# Patient Record
Sex: FEMALE | Race: WHITE | Employment: FULL TIME | URBAN - METROPOLITAN AREA
[De-identification: names, ages, dates, MRNs, and addresses within clinical notes are randomized per-mention and may not be internally consistent; named-entity substitution may affect disease eponyms.]

---

## 2017-01-02 RX ORDER — SERTRALINE HYDROCHLORIDE 100 MG/1
TABLET, FILM COATED ORAL
Qty: 30 TAB | Refills: 0 | Status: SHIPPED | OUTPATIENT
Start: 2017-01-02 | End: 2017-02-05 | Stop reason: SDUPTHER

## 2017-02-06 RX ORDER — SERTRALINE HYDROCHLORIDE 100 MG/1
TABLET, FILM COATED ORAL
Qty: 30 TAB | Refills: 0 | Status: SHIPPED | OUTPATIENT
Start: 2017-02-06 | End: 2017-03-19 | Stop reason: SDUPTHER

## 2017-03-20 RX ORDER — LORAZEPAM 0.5 MG/1
TABLET ORAL
Qty: 60 TAB | Refills: 0 | OUTPATIENT
Start: 2017-03-20

## 2017-03-20 RX ORDER — SERTRALINE HYDROCHLORIDE 100 MG/1
TABLET, FILM COATED ORAL
Qty: 30 TAB | Refills: 5 | Status: SHIPPED | OUTPATIENT
Start: 2017-03-20 | End: 2017-08-11 | Stop reason: DRUGHIGH

## 2017-03-20 NOTE — TELEPHONE ENCOUNTER
Pt notified (confirmed x 3). Patient verbalized understanding. Sched appt on 3/24/17 @ 3:15 w/ Marleni.

## 2017-03-24 RX ORDER — LORAZEPAM 0.5 MG/1
TABLET ORAL
Qty: 14 TAB | Refills: 0 | Status: SHIPPED | OUTPATIENT
Start: 2017-03-24 | End: 2017-08-11

## 2017-06-27 ENCOUNTER — APPOINTMENT (OUTPATIENT)
Dept: CT IMAGING | Age: 27
End: 2017-06-27
Attending: PHYSICIAN ASSISTANT
Payer: COMMERCIAL

## 2017-06-27 ENCOUNTER — HOSPITAL ENCOUNTER (EMERGENCY)
Age: 27
Discharge: HOME | End: 2017-06-27
Attending: EMERGENCY MEDICINE
Payer: COMMERCIAL

## 2017-06-27 VITALS
WEIGHT: 230 LBS | OXYGEN SATURATION: 100 % | DIASTOLIC BLOOD PRESSURE: 78 MMHG | HEIGHT: 67 IN | BODY MASS INDEX: 36.1 KG/M2 | TEMPERATURE: 98.2 F | HEART RATE: 87 BPM | SYSTOLIC BLOOD PRESSURE: 133 MMHG | RESPIRATION RATE: 15 BRPM

## 2017-06-27 DIAGNOSIS — N20.1 URETERAL STONE: Primary | ICD-10-CM

## 2017-06-27 LAB
ANION GAP BLD CALC-SCNC: 16 MMOL/L (ref 5–15)
APPEARANCE UR: ABNORMAL
BACTERIA URNS QL MICRO: ABNORMAL /HPF
BILIRUB UR QL: NEGATIVE
BUN BLD-MCNC: 16 MG/DL (ref 9–20)
CA-I BLD-MCNC: 1.19 MMOL/L (ref 1.12–1.32)
CHLORIDE BLD-SCNC: 105 MMOL/L (ref 98–107)
CO2 BLD-SCNC: 24 MMOL/L (ref 21–32)
COLOR UR: ABNORMAL
CREAT BLD-MCNC: 0.7 MG/DL (ref 0.6–1.3)
EPITH CASTS URNS QL MICRO: ABNORMAL /LPF
GLUCOSE BLD-MCNC: 86 MG/DL (ref 65–100)
GLUCOSE UR STRIP.AUTO-MCNC: NEGATIVE MG/DL
HCG UR QL: NEGATIVE
HCT VFR BLD CALC: 41 % (ref 35–47)
HGB BLD-MCNC: 13.9 GM/DL (ref 11.5–16)
HGB UR QL STRIP: ABNORMAL
KETONES UR QL STRIP.AUTO: NEGATIVE MG/DL
LEUKOCYTE ESTERASE UR QL STRIP.AUTO: ABNORMAL
NITRITE UR QL STRIP.AUTO: NEGATIVE
PH UR STRIP: 5.5 [PH] (ref 5–8)
POTASSIUM BLD-SCNC: 3.8 MMOL/L (ref 3.5–5.1)
PROT UR STRIP-MCNC: ABNORMAL MG/DL
RBC #/AREA URNS HPF: >100 /HPF (ref 0–5)
SERVICE CMNT-IMP: ABNORMAL
SODIUM BLD-SCNC: 141 MMOL/L (ref 136–145)
SP GR UR REFRACTOMETRY: 1.02 (ref 1–1.03)
UROBILINOGEN UR QL STRIP.AUTO: 0.2 EU/DL (ref 0.2–1)
WBC URNS QL MICRO: ABNORMAL /HPF (ref 0–4)

## 2017-06-27 PROCEDURE — 81025 URINE PREGNANCY TEST: CPT

## 2017-06-27 PROCEDURE — 99284 EMERGENCY DEPT VISIT MOD MDM: CPT

## 2017-06-27 PROCEDURE — 74011250636 HC RX REV CODE- 250/636: Performed by: PHYSICIAN ASSISTANT

## 2017-06-27 PROCEDURE — 74176 CT ABD & PELVIS W/O CONTRAST: CPT

## 2017-06-27 PROCEDURE — 80047 BASIC METABLC PNL IONIZED CA: CPT

## 2017-06-27 PROCEDURE — 96372 THER/PROPH/DIAG INJ SC/IM: CPT

## 2017-06-27 PROCEDURE — 81001 URINALYSIS AUTO W/SCOPE: CPT | Performed by: PHYSICIAN ASSISTANT

## 2017-06-27 RX ORDER — KETOROLAC TROMETHAMINE 30 MG/ML
30 INJECTION, SOLUTION INTRAMUSCULAR; INTRAVENOUS
Status: COMPLETED | OUTPATIENT
Start: 2017-06-27 | End: 2017-06-27

## 2017-06-27 RX ADMIN — KETOROLAC TROMETHAMINE 30 MG: 30 INJECTION, SOLUTION INTRAMUSCULAR at 18:51

## 2017-06-27 NOTE — ED NOTES
Pt was discharged and given instructions by RENAN Adamson [id number]. Pt verbalized good understanding of all discharge instructions, and F/U care. All questions answered. Pt in stable condition on discharge.

## 2017-06-27 NOTE — ED TRIAGE NOTES
Pt ambulated to the treatment area with a steady gait. Pt states \"this morning I started having mid right back pain radiating to my lower back, I have blood in my urine and its not comfortable to urinate. \" Pt denies fever.

## 2017-06-27 NOTE — DISCHARGE INSTRUCTIONS
Kidney Stone: Care Instructions  Your Care Instructions    Kidney stones are formed when salts, minerals, and other substances normally found in the urine clump together. They can be as small as grains of sand or, rarely, as large as golf balls. While the stone is traveling through the ureter, which is the tube that carries urine from the kidney to the bladder, you will probably feel pain. The pain may be mild or very severe. You may also have some blood in your urine. As soon as the stone reaches the bladder, any intense pain should go away. If a stone is too large to pass on its own, you may need a medical procedure to help you pass the stone. The doctor has checked you carefully, but problems can develop later. If you notice any problems or new symptoms, get medical treatment right away. Follow-up care is a key part of your treatment and safety. Be sure to make and go to all appointments, and call your doctor if you are having problems. It's also a good idea to know your test results and keep a list of the medicines you take. How can you care for yourself at home? · Drink plenty of fluids, enough so that your urine is light yellow or clear like water. If you have kidney, heart, or liver disease and have to limit fluids, talk with your doctor before you increase the amount of fluids you drink. · Take pain medicines exactly as directed. Call your doctor if you think you are having a problem with your medicine. ¨ If the doctor gave you a prescription medicine for pain, take it as prescribed. ¨ If you are not taking a prescription pain medicine, ask your doctor if you can take an over-the-counter medicine. Read and follow all instructions on the label. · Your doctor may ask you to strain your urine so that you can collect your kidney stone when it passes. You can use a kitchen strainer or a tea strainer to catch the stone. Store it in a plastic bag until you see your doctor again.   Preventing future kidney stones  Some changes in your diet may help prevent kidney stones. Depending on the cause of your stones, your doctor may recommend that you:  · Drink plenty of fluids, enough so that your urine is light yellow or clear like water. If you have kidney, heart, or liver disease and have to limit fluids, talk with your doctor before you increase the amount of fluids you drink. · Limit coffee, tea, and alcohol. Also avoid grapefruit juice. · Do not take more than the recommended daily dose of vitamins C and D.  · Avoid antacids such as Gaviscon, Maalox, Mylanta, or Tums. · Limit the amount of salt (sodium) in your diet. · Eat a balanced diet that is not too high in protein. · Limit foods that are high in a substance called oxalate, which can cause kidney stones. These foods include dark green vegetables, rhubarb, chocolate, wheat bran, nuts, cranberries, and beans. When should you call for help? Call your doctor now or seek immediate medical care if:  · You cannot keep down fluids. · Your pain gets worse. · You have a fever or chills. · You have new or worse pain in your back just below your rib cage (the flank area). · You have new or more blood in your urine. Watch closely for changes in your health, and be sure to contact your doctor if:  · You do not get better as expected. Where can you learn more? Go to http://ezequiel-mitchell.info/. Enter L016 in the search box to learn more about \"Kidney Stone: Care Instructions. \"  Current as of: April 3, 2017  Content Version: 11.3  © 5690-8414 [employer]. Care instructions adapted under license by [employer] (which disclaims liability or warranty for this information). If you have questions about a medical condition or this instruction, always ask your healthcare professional. [address] any warranty or liability for your use of this information.          We hope that we have addressed all of your medical concerns. The examination and treatment you received in the Emergency Department were for an emergent problem and were not intended as complete care. It is important that you follow up with your healthcare provider(s) for ongoing care. If your symptoms worsen or do not improve as expected, and you are unable to reach your usual health care provider(s), you should return to the Emergency Department. Today's healthcare is undergoing tremendous change, and patient satisfaction surveys are one of the many tools to assess the quality of medical care. You may receive a survey from the [employer] organization regarding your experience in the Emergency Department. I hope that your experience has been completely positive, particularly the medical care that I provided. As such, please participate in the survey; anything less than excellent does not meet my expectations or intentions. [facility] and [facility] participate in nationally recognized quality of care measures. If your blood pressure is greater than 120/80, as reported below, we urge that you seek medical care to address the potential of high blood pressure, commonly known as hypertension. Hypertension can be hereditary or can be caused by certain medical conditions, pain, stress, or \"white coat syndrome. \"       Please make an appointment with your health care provider(s) for follow up of your Emergency Department visit. VITALS:   Patient Vitals for the past 8 hrs:   Temp Pulse Resp BP SpO2   06/27/17 1701 98.2 °F (36.8 °C) 87 15 133/78 100 %          Thank you for allowing us to provide you with medical care today. We realize that you have many choices for your emergency care needs. Please choose us in the future for any continued health care needs. [place] [address].   Office: [phone]            Recent Results (from the past 24 hour(s))   URINALYSIS W/MICROSCOPIC    Collection Time: 06/27/17  5:14 PM   Result Value Ref Range    Color YELLOW/STRAW      Appearance CLOUDY (A) CLEAR      Specific gravity 1.025 1.003 - 1.030      pH (UA) 5.5 5.0 - 8.0      Protein TRACE (A) NEG mg/dL    Glucose NEGATIVE  NEG mg/dL    Ketone NEGATIVE  NEG mg/dL    Bilirubin NEGATIVE  NEG      Blood LARGE (A) NEG      Urobilinogen 0.2 0.2 - 1.0 EU/dL    Nitrites NEGATIVE  NEG      Leukocyte Esterase TRACE (A) NEG      WBC 0-4 0 - 4 /hpf    RBC >100 (H) 0 - 5 /hpf    Epithelial cells FEW FEW /lpf    Bacteria 2+ (A) NEG /hpf   POC CHEM8    Collection Time: 06/27/17  5:32 PM   Result Value Ref Range    Calcium, ionized (POC) 1.19 1.12 - 1.32 MMOL/L    Sodium (POC) 141 136 - 145 MMOL/L    Potassium (POC) 3.8 3.5 - 5.1 MMOL/L    Chloride (POC) 105 98 - 107 MMOL/L    CO2 (POC) 24 21 - 32 MMOL/L    Anion gap (POC) 16 (H) 5 - 15 mmol/L    Glucose (POC) 86 65 - 100 MG/DL    BUN (POC) 16 9 - 20 MG/DL    Creatinine (POC) 0.7 0.6 - 1.3 MG/DL    GFRAA, POC >60 >60 ml/min/1.73m2    GFRNA, POC >60 >60 ml/min/1.73m2    Hemoglobin (POC) 13.9 11.5 - 16.0 GM/DL    Hematocrit (POC) 41 35.0 - 47.0 %    Comment Comment Not Indicated. HCG URINE, QL. - POC    Collection Time: 06/27/17  5:33 PM   Result Value Ref Range    Pregnancy test,urine (POC) NEGATIVE  NEG         Ct Abd Pelv Wo Cont    Result Date: 6/27/2017  INDICATION: Right flank pain; hematuria COMPARISON: September 19, 2013 TECHNIQUE: Thin axial images were obtained through the abdomen and pelvis. Coronal and sagittal reconstructions were generated. Oral contrast was not administered. CT dose reduction was achieved through use of a standardized protocol tailored for this examination and automatic exposure control for dose modulation. Adaptive statistical iterative reconstruction (ASIR) was utilized.  The absence of intravenous contrast material reduces the sensitivity for evaluation of the solid parenchymal organs of the abdomen. FINDINGS: LUNG BASES: Clear. INCIDENTALLY IMAGED HEART AND MEDIASTINUM: Unremarkable. LIVER: No mass or biliary dilatation. GALLBLADDER: Unremarkable. SPLEEN: No mass. PANCREAS: No mass or ductal dilatation. ADRENALS: Unremarkable. KIDNEYS/URETERS: Small bilateral nonobstructing renal calculi. No hydronephrosis or ureteral calculus. STOMACH: Unremarkable. SMALL BOWEL: No dilatation or wall thickening. COLON: No dilatation or wall thickening. APPENDIX: Normal PERITONEUM: No ascites or pneumoperitoneum. RETROPERITONEUM: No lymphadenopathy or aortic aneurysm. REPRODUCTIVE ORGANS: Normal uterus and ovaries. URINARY BLADDER: No mass or calculus. BONES: No destructive bone lesion. ADDITIONAL COMMENTS: N/A     IMPRESSION: Small bilateral nonobstructing renal calculi. No hydronephrosis or ureteral calculus.

## 2017-06-28 NOTE — ED PROVIDER NOTES
HPI Comments: 27 y.o. female with past medical history significant for iron deficiency anemia presents with complaints of sudden onset of right flank pain radiating into her RLQ with hematuria which began today. The pt states that nothing makes the pain better or worse. The pt rates the pain as a 5/10 in severity. The pt describes the pain as dull ache. The pt denies taking anything at home for the pain. There are no other acute medical complaints at this time. PCP: ANTOINE Bone PA-C      Patient is a 27 y.o. female presenting with hematuria and back pain. Blood in Urine    Associated symptoms include back pain. Pertinent negatives include no nausea, no vomiting, no hematuria, no flank pain and no abdominal pain. Back Pain    Pertinent negatives include no chest pain, no fever, no numbness, no abdominal pain and no dysuria. Past Medical History:   Diagnosis Date    Asthma     Essential hypertension     last pregnancy; current pregnancy    Iron deficiency anemia     Other ill-defined conditions     pseudo tumor cerebrae    Psychiatric disorder     depression, anxiety    Seizure [facility])        Past Surgical History:   Procedure Laterality Date     DELIVERY ONLY       DELIVERY ONLY      HX GYN       , ,    HX TUBAL LIGATION           Family History:   Problem Relation Age of Onset    Lung Disease Maternal Grandmother     No Known Problems Mother     Hypertension Father        Social History     Social History    Marital status: SINGLE     Spouse name: N/A    Number of children: N/A    Years of education: N/A     Occupational History    Not on file.      Social History Main Topics    Smoking status: Current Some Day Smoker     Packs/day: 0.50     Years: 0.50     Types: Cigarettes     Last attempt to quit: 2015    Smokeless tobacco: Never Used      Comment: socially, vapor pen    Alcohol use 0.0 oz/week 0 Standard drinks or equivalent per week      Comment: socially    Drug use: No    Sexual activity: Yes     Partners: Male     Birth control/ protection: None     Other Topics Concern    Not on file     Social History Narrative         ALLERGIES: Sulfa (sulfonamide antibiotics)    Review of Systems   Constitutional: Negative for activity change, appetite change, diaphoresis and fever. HENT: Negative for ear discharge, ear pain, facial swelling, rhinorrhea, sore throat, tinnitus, trouble swallowing and voice change. Eyes: Negative for photophobia, pain, discharge, redness and visual disturbance. Respiratory: Negative for cough, chest tightness, shortness of breath, wheezing and stridor. Cardiovascular: Negative for chest pain and palpitations. Gastrointestinal: Negative for abdominal pain, constipation, diarrhea, nausea and vomiting. Endocrine: Negative for polydipsia and polyuria. Genitourinary: Negative for dysuria, flank pain and hematuria. Musculoskeletal: Positive for back pain. Negative for arthralgias and myalgias. Skin: Negative for color change and rash. Neurological: Negative for dizziness, syncope, speech difficulty, light-headedness and numbness. Psychiatric/Behavioral: Negative for behavioral problems. Vitals:    06/27/17 1701   BP: 133/78   Pulse: 87   Resp: 15   Temp: 98.2 °F (36.8 °C)   SpO2: 100%   Weight: 104.3 kg (230 lb)   Height: 5' 7\" (1.702 m)            Physical Exam   Constitutional: She is oriented to person, place, and time. She appears well-developed and well-nourished. HENT:   Head: Normocephalic and atraumatic. Eyes: Conjunctivae are normal. Pupils are equal, round, and reactive to light. Right eye exhibits no discharge. Left eye exhibits no discharge. Neck: Normal range of motion. Neck supple. No thyromegaly present. Cardiovascular: Normal rate, regular rhythm and normal heart sounds. Exam reveals no gallop and no friction rub.     No murmur heard.  Pulmonary/Chest: Effort normal and breath sounds normal. No respiratory distress. She has no wheezes. Abdominal: Soft. Bowel sounds are normal. She exhibits no distension. There is no tenderness. There is no rebound and no guarding. Musculoskeletal: Normal range of motion. Neurological: She is alert and oriented to person, place, and time. Skin: Skin is warm. Psychiatric: She has a normal mood and affect. MDM  Number of Diagnoses or Management Options  Ureteral stone:   Diagnosis management comments: Pt presents with flank pain and hematuria. UA revealed >100 RBC. Pt is not on her menstrual period. CT did not reveal ureteral stone. Possible pt passed stone. No UTI. Pt advised to follow up with family doctor for further evaluation of symptoms. Reviewed treatment plan with attending and they agree.   Aury Wills PA-C    ED Course       Procedures

## 2017-07-25 ENCOUNTER — APPOINTMENT (OUTPATIENT)
Dept: GENERAL RADIOLOGY | Age: 27
End: 2017-07-25
Attending: EMERGENCY MEDICINE
Payer: COMMERCIAL

## 2017-07-25 ENCOUNTER — HOSPITAL ENCOUNTER (EMERGENCY)
Age: 27
Discharge: HOME | End: 2017-07-25
Attending: EMERGENCY MEDICINE | Admitting: EMERGENCY MEDICINE
Payer: COMMERCIAL

## 2017-07-25 VITALS
DIASTOLIC BLOOD PRESSURE: 82 MMHG | HEIGHT: 67 IN | BODY MASS INDEX: 36.1 KG/M2 | TEMPERATURE: 98.7 F | SYSTOLIC BLOOD PRESSURE: 151 MMHG | OXYGEN SATURATION: 97 % | HEART RATE: 76 BPM | RESPIRATION RATE: 20 BRPM | WEIGHT: 230 LBS

## 2017-07-25 DIAGNOSIS — R07.9 ACUTE CHEST PAIN: Primary | ICD-10-CM

## 2017-07-25 DIAGNOSIS — R07.89 CHEST WALL PAIN: ICD-10-CM

## 2017-07-25 LAB
ALBUMIN SERPL BCP-MCNC: 3.8 G/DL (ref 3.5–5)
ALBUMIN/GLOB SERPL: 1 {RATIO} (ref 1.1–2.2)
ALP SERPL-CCNC: 52 U/L (ref 45–117)
ALT SERPL-CCNC: 24 U/L (ref 12–78)
ANION GAP BLD CALC-SCNC: 9 MMOL/L (ref 5–15)
AST SERPL W P-5'-P-CCNC: 16 U/L (ref 15–37)
BASOPHILS # BLD AUTO: 0 K/UL (ref 0–0.1)
BASOPHILS # BLD: 0 % (ref 0–1)
BILIRUB SERPL-MCNC: 0.3 MG/DL (ref 0.2–1)
BUN SERPL-MCNC: 17 MG/DL (ref 6–20)
BUN/CREAT SERPL: 24 (ref 12–20)
CALCIUM SERPL-MCNC: 8.4 MG/DL (ref 8.5–10.1)
CHLORIDE SERPL-SCNC: 104 MMOL/L (ref 97–108)
CK MB CFR SERPL CALC: NORMAL % (ref 0–2.5)
CK MB SERPL-MCNC: <1 NG/ML (ref 5–25)
CK SERPL-CCNC: 66 U/L (ref 26–192)
CO2 SERPL-SCNC: 26 MMOL/L (ref 21–32)
CREAT SERPL-MCNC: 0.7 MG/DL (ref 0.55–1.02)
D DIMER PPP FEU-MCNC: <0.17 MG/L FEU (ref 0–0.65)
EOSINOPHIL # BLD: 0.2 K/UL (ref 0–0.4)
EOSINOPHIL NFR BLD: 2 % (ref 0–7)
ERYTHROCYTE [DISTWIDTH] IN BLOOD BY AUTOMATED COUNT: 12.3 % (ref 11.5–14.5)
GLOBULIN SER CALC-MCNC: 3.8 G/DL (ref 2–4)
GLUCOSE SERPL-MCNC: 160 MG/DL (ref 65–100)
HCG UR QL: NEGATIVE
HCT VFR BLD AUTO: 39.5 % (ref 35–47)
HGB BLD-MCNC: 13.9 G/DL (ref 11.5–16)
LYMPHOCYTES # BLD AUTO: 31 % (ref 12–49)
LYMPHOCYTES # BLD: 3.3 K/UL (ref 0.8–3.5)
MCH RBC QN AUTO: 33 PG (ref 26–34)
MCHC RBC AUTO-ENTMCNC: 35.2 G/DL (ref 30–36.5)
MCV RBC AUTO: 93.8 FL (ref 80–99)
MONOCYTES # BLD: 0.8 K/UL (ref 0–1)
MONOCYTES NFR BLD AUTO: 7 % (ref 5–13)
NEUTS SEG # BLD: 6.1 K/UL (ref 1.8–8)
NEUTS SEG NFR BLD AUTO: 60 % (ref 32–75)
PLATELET # BLD AUTO: 225 K/UL (ref 150–400)
POTASSIUM SERPL-SCNC: 3.6 MMOL/L (ref 3.5–5.1)
PROT SERPL-MCNC: 7.6 G/DL (ref 6.4–8.2)
RBC # BLD AUTO: 4.21 M/UL (ref 3.8–5.2)
SODIUM SERPL-SCNC: 139 MMOL/L (ref 136–145)
TROPONIN I SERPL-MCNC: <0.04 NG/ML
WBC # BLD AUTO: 10.3 K/UL (ref 3.6–11)

## 2017-07-25 PROCEDURE — 82550 ASSAY OF CK (CPK): CPT | Performed by: EMERGENCY MEDICINE

## 2017-07-25 PROCEDURE — 80053 COMPREHEN METABOLIC PANEL: CPT | Performed by: EMERGENCY MEDICINE

## 2017-07-25 PROCEDURE — 84484 ASSAY OF TROPONIN QUANT: CPT | Performed by: EMERGENCY MEDICINE

## 2017-07-25 PROCEDURE — 99285 EMERGENCY DEPT VISIT HI MDM: CPT

## 2017-07-25 PROCEDURE — 81025 URINE PREGNANCY TEST: CPT

## 2017-07-25 PROCEDURE — 85025 COMPLETE CBC W/AUTO DIFF WBC: CPT | Performed by: EMERGENCY MEDICINE

## 2017-07-25 PROCEDURE — 85379 FIBRIN DEGRADATION QUANT: CPT | Performed by: EMERGENCY MEDICINE

## 2017-07-25 PROCEDURE — 93005 ELECTROCARDIOGRAM TRACING: CPT

## 2017-07-25 PROCEDURE — 36415 COLL VENOUS BLD VENIPUNCTURE: CPT | Performed by: EMERGENCY MEDICINE

## 2017-07-25 PROCEDURE — 71020 XR CHEST PA LAT: CPT

## 2017-07-25 PROCEDURE — 74011250637 HC RX REV CODE- 250/637: Performed by: EMERGENCY MEDICINE

## 2017-07-25 RX ORDER — IBUPROFEN 600 MG/1
600 TABLET ORAL
Status: COMPLETED | OUTPATIENT
Start: 2017-07-25 | End: 2017-07-25

## 2017-07-25 RX ORDER — DIAZEPAM 5 MG/1
5 TABLET ORAL
Status: COMPLETED | OUTPATIENT
Start: 2017-07-25 | End: 2017-07-25

## 2017-07-25 RX ADMIN — DIAZEPAM 5 MG: 5 TABLET ORAL at 20:22

## 2017-07-25 RX ADMIN — IBUPROFEN 600 MG: 600 TABLET, FILM COATED ORAL at 20:50

## 2017-07-25 NOTE — ED TRIAGE NOTES
Patient arrives with c/o left sided chest pain around breast which started yesterday; the episode lasted all day yesterday but today it is intermittent. States it feels like her \"heart is jumping\". Has hx of the same; stress test and halter monitor were all negative and Cardiologist/PCP advised her that probably anxiety, hx of low potassium.

## 2017-07-25 NOTE — ED PROVIDER NOTES
HPI Comments: 27 y.o. female with past medical history significant for asthma, seizure, hypertension, depression, anxiety, and anemia who presents from home with chief complaint of chest pain. Patient states onset of moderate left-sided chest pain underneath the breast yesterday that progressively worsened throughout the day. Patient mentions the pain was consistent throughout the day yesterday; however, it comes in episodes of 5-10 minutes today. Patient states the pain is not aggravated with movement but is aggravated upon palpation. Patient mentions some diaphoresis around her lips and feeling  \"confused\" at work today. Patient also complains of generalized fatigue today while at work. Patient mentions hx of the present sx, which was suspected from anxiety since the patient had a negative stress and holter monitor. Patient admits she knew she was anxious at that time, but states her anxiety is currently managed at this time. Patient currently takes Ativan and Zoloft for her anxiety with relief. Patient admits her anxiety could play a role every time the pain presents itself. Patient denies any chance of pregnancy. Patient denies leg swelling, cough, fever, and abdominal pain. There are no other acute medical concerns at this time. Old Chart Review: Per note, patient was evaluated for similar sx of left-sided chest pain on 11/20/15. Patient's sx were suspected to be from anxiety during this visit and patient was discharged with Protonix and cardiology follow up. Patient was evaluated by cardiology 5 days later and had a negative stress and holter monitor. 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 Tobacco Use: No (former smoker), Alcohol Use: Yes    PCP: Ernestine Calloway NP    Note written by Marisol Neri, as dictated by Ld Mclean MD 7:58 PM        The history is provided by the patient.         Past Medical History:   Diagnosis Date    Asthma     Essential hypertension last pregnancy; current pregnancy    Iron deficiency anemia     Other ill-defined conditions     pseudo tumor cerebrae    Psychiatric disorder     depression, anxiety    Seizure [facility])        Past Surgical History:   Procedure Laterality Date     DELIVERY ONLY     [facility]  DELIVERY ONLY      HX DILATION AND CURETTAGE      HX GYN       , ,    HX TUBAL LIGATION           Family History:   Problem Relation Age of Onset    Lung Disease Maternal Grandmother     No Known Problems Mother     Hypertension Father        Social History     Social History    Marital status: SINGLE     Spouse name: N/A    Number of children: N/A    Years of education: N/A     Occupational History    Not on file. Social History Main Topics    Smoking status: Former Smoker     Packs/day: 0.50     Years: 0.50     Types: Cigarettes     Quit date: 2015    Smokeless tobacco: Never Used      Comment: socially, vapor pen    Alcohol use 0.0 oz/week     0 Standard drinks or equivalent per week      Comment: socially    Drug use: No    Sexual activity: Yes     Partners: Male     Birth control/ protection: None     Other Topics Concern    Not on file     Social History Narrative         ALLERGIES: Sulfa (sulfonamide antibiotics)    Review of Systems   Constitutional: Positive for diaphoresis and fatigue. Negative for chills and fever. Respiratory: Negative for cough. Cardiovascular: Positive for chest pain. Negative for leg swelling. Gastrointestinal: Negative for abdominal pain. Neurological: Positive for dizziness. Psychiatric/Behavioral: Positive for confusion. All other systems reviewed and are negative.       Vitals:    17 1842 17 1845   BP: 140/76 126/76   Pulse: 89    Resp: 17    Temp: 98.7 °F (37.1 °C)    SpO2: 99% 99%   Weight: 104.3 kg (230 lb)    Height: 5' 7\" (1.702 m)             Physical Exam   Physical Examination: General appearance - alert, well appearing, and in no distress, oriented to person, place, and time and normal appearing weight  Eyes - pupils equal and reactive, extraocular eye movements intact  Neck - supple, no significant adenopathy  Chest - clear to auscultation, no wheezes, rales or rhonchi, symmetric air entry, mild tenderness along left sternal border, no rash, no crepitus or subcutaneous air  Heart - normal rate, regular rhythm, normal S1, S2, no murmurs, rubs, clicks or gallops  Abdomen - soft, nontender, nondistended, no masses or organomegaly  Back exam - full range of motion, no tenderness, palpable spasm or pain on motion  Neurological - alert, oriented, normal speech, no focal findings or movement disorder noted  Musculoskeletal - no joint tenderness, deformity or swelling  Extremities - peripheral pulses normal, no pedal edema, no clubbing or cyanosis  Skin - normal coloration and turgor, no rashes, no suspicious skin lesions noted  MDM  Number of Diagnoses or Management Options     Amount and/or Complexity of Data Reviewed  Clinical lab tests: ordered and reviewed  Tests in the radiology section of CPT®: ordered and reviewed  Decide to obtain previous medical records or to obtain history from someone other than the patient: yes  Obtain history from someone other than the patient: yes (family)  Review and summarize past medical records: yes  Independent visualization of images, tracings, or specimens: yes    Patient Progress  Patient progress: improved    ED Course       Procedures    EKG interpretation: (Preliminary)  Rhythm: normal sinus rhythm; and irregular. Rate (approx.): 93; Axis: normal; WI interval: normal; QRS interval: normal ; ST/T wave: non-specific changes; Pt afebrile, nontoxic, VSS. Sats >98% on RA. Will d/c with pcp f/u.

## 2017-07-25 NOTE — ED NOTES
Bedside shift change report given to Jaclyn Whyte RN (oncoming nurse) by Jey Agudelo RN (offgoing nurse). Report included the following information SBAR, Kardex, ED Summary, Procedure Summary, Intake/Output, MAR, Recent Results and Cardiac Rhythm NSR.

## 2017-07-26 LAB
ATRIAL RATE: 93 BPM
CALCULATED P AXIS, ECG09: 18 DEGREES
CALCULATED R AXIS, ECG10: 14 DEGREES
CALCULATED T AXIS, ECG11: 12 DEGREES
DIAGNOSIS, 93000: NORMAL
P-R INTERVAL, ECG05: 146 MS
Q-T INTERVAL, ECG07: 372 MS
QRS DURATION, ECG06: 76 MS
QTC CALCULATION (BEZET), ECG08: 462 MS
VENTRICULAR RATE, ECG03: 93 BPM

## 2017-07-26 NOTE — DISCHARGE INSTRUCTIONS
Chest Pain: Care Instructions  Your Care Instructions  There are many things that can cause chest pain. Some are not serious and will get better on their own in a few days. But some kinds of chest pain need more testing and treatment. Your doctor may have recommended a follow-up visit in the next 8 to 12 hours. If you are not getting better, you may need more tests or treatment. Even though your doctor has released you, you still need to watch for any problems. The doctor carefully checked you, but sometimes problems can develop later. If you have new symptoms or if your symptoms do not get better, get medical care right away. If you have worse or different chest pain or pressure that lasts more than 5 minutes or you passed out (lost consciousness), call 911 or seek other emergency help right away. A medical visit is only one step in your treatment. Even if you feel better, you still need to do what your doctor recommends, such as going to all suggested follow-up appointments and taking medicines exactly as directed. This will help you recover and help prevent future problems. How can you care for yourself at home? · Rest until you feel better. · Take your medicine exactly as prescribed. Call your doctor if you think you are having a problem with your medicine. · Do not drive after taking a prescription pain medicine. When should you call for help? Call 911 if:  · You passed out (lost consciousness). · You have severe difficulty breathing. · You have symptoms of a heart attack. These may include:  ¨ Chest pain or pressure, or a strange feeling in your chest.  ¨ Sweating. ¨ Shortness of breath. ¨ Nausea or vomiting. ¨ Pain, pressure, or a strange feeling in your back, neck, jaw, or upper belly or in one or both shoulders or arms. ¨ Lightheadedness or sudden weakness. ¨ A fast or irregular heartbeat.   After you call 911, the  may tell you to chew 1 adult-strength or 2 to 4 low-dose aspirin. Wait for an ambulance. Do not try to drive yourself. Call your doctor today if:  · You have any trouble breathing. · Your chest pain gets worse. · You are dizzy or lightheaded, or you feel like you may faint. · You are not getting better as expected. · You are having new or different chest pain. Where can you learn more? Go to http://ezequiel-mitchell.info/. Enter A120 in the search box to learn more about \"Chest Pain: Care Instructions. \"  Current as of: March 20, 2017  Content Version: 11.3  © 1576-4921 [employer]. Care instructions adapted under license by [employer] (which disclaims liability or warranty for this information). If you have questions about a medical condition or this instruction, always ask your healthcare professional. [address] any warranty or liability for your use of this information. Musculoskeletal Chest Pain: Care Instructions  Your Care Instructions  Chest pain is not always a sign that something is wrong with your heart or that you have another serious problem. The doctor thinks your chest pain is caused by strained muscles or ligaments, inflamed chest cartilage, or another problem in your chest, rather than by your heart. You may need more tests to find the cause of your chest pain. Follow-up care is a key part of your treatment and safety. Be sure to make and go to all appointments, and call your doctor if you are having problems. Its also a good idea to know your test results and keep a list of the medicines you take. How can you care for yourself at home? · Take pain medicines exactly as directed. ¨ If the doctor gave you a prescription medicine for pain, take it as prescribed. ¨ If you are not taking a prescription pain medicine, ask your doctor if you can take an over-the-counter medicine. · Rest and protect the sore area.   · Stop, change, or take a break from any activity that may be causing your pain or soreness. · Put ice or a cold pack on the sore area for 10 to 20 minutes at a time. Try to do this every 1 to 2 hours for the next 3 days (when you are awake) or until the swelling goes down. Put a thin cloth between the ice and your skin. · After 2 or 3 days, apply a heating pad set on low or a warm cloth to the area that hurts. Some doctors suggest that you go back and forth between hot and cold. · Do not wrap or tape your ribs for support. This may cause you to take smaller breaths, which could increase your risk of lung problems. · Mentholated creams such as Bengay or Icy Hot may soothe sore muscles. Follow the instructions on the package. · Follow your doctor's instructions for exercising. · Gentle stretching and massage may help you get better faster. Stretch slowly to the point just before pain begins, and hold the stretch for at least 15 to 30 seconds. Do this 3 or 4 times a day. Stretch just after you have applied heat. · As your pain gets better, slowly return to your normal activities. Any increased pain may be a sign that you need to rest a while longer. When should you call for help? Call 911 anytime you think you may need emergency care. For example, call if:  · You have chest pain or pressure. This may occur with:  ¨ Sweating. ¨ Shortness of breath. ¨ Nausea or vomiting. ¨ Pain that spreads from the chest to the neck, jaw, or one or both shoulders or arms. ¨ Dizziness or lightheadedness. ¨ A fast or uneven pulse. After calling 911, chew 1 adult-strength aspirin. Wait for an ambulance. Do not try to drive yourself. · You have sudden chest pain and shortness of breath, or you cough up blood. Call your doctor now or seek immediate medical care if:  · You have any trouble breathing. · Your chest pain gets worse.   · Your chest pain occurs consistently with exercise and is relieved by rest.  Watch closely for changes in your health, and be sure to contact your doctor if:  · Your chest pain does not get better after 1 week. Where can you learn more? Go to http://ezequiel-mitchell.info/. Enter V293 in the search box to learn more about \"Musculoskeletal Chest Pain: Care Instructions. \"  Current as of: March 20, 2017  Content Version: 11.3  © 6647-3013 [employer]. Care instructions adapted under license by [employer] (which disclaims liability or warranty for this information). If you have questions about a medical condition or this instruction, always ask your healthcare professional. [place] [address] any warranty or liability for your use of this information. We hope that we have addressed all of your medical concerns. The examination and treatment you received in the Emergency Department were for an emergent problem and were not intended as complete care. It is important that you follow up with your healthcare provider(s) for ongoing care. If your symptoms worsen or do not improve as expected, and you are unable to reach your usual health care provider(s), you should return to the Emergency Department. Today's healthcare is undergoing tremendous change, and patient satisfaction surveys are one of the many tools to assess the quality of medical care. You may receive a survey from the [facility] regarding your experience in the Emergency Department. I hope that your experience has been completely positive, particularly the medical care that I provided. As such, please participate in the survey; anything less than excellent does not meet my expectations or intentions. [address] [facility] and [address] [place] [address] participate in nationally recognized quality of care measures. If your blood pressure is greater than 120/80, as reported below, we urge that you seek medical care to address the potential of high blood pressure, commonly known as hypertension. Hypertension can be hereditary or can be caused by certain medical conditions, pain, stress, or \"white coat syndrome. \"       Please make an appointment with your health care provider(s) for follow up of your Emergency Department visit. VITALS:   Patient Vitals for the past 8 hrs:   Temp Pulse Resp BP SpO2   07/25/17 2000 - 87 18 145/77 -   07/25/17 1945 - 87 19 129/69 -   07/25/17 1915 - 83 16 143/74 98 %   07/25/17 1845 - - - 126/76 99 %   07/25/17 1842 98.7 °F (37.1 °C) 89 17 140/76 99 %          Thank you for allowing us to provide you with medical care today. We realize that you have many choices for your emergency care needs. Please choose us in the future for any continued health care needs. [address], [address] [place] [address].   Office: [phone]            Recent Results (from the past 24 hour(s))   CBC WITH AUTOMATED DIFF    Collection Time: 07/25/17  7:07 PM   Result Value Ref Range    WBC 10.3 3.6 - 11.0 K/uL    RBC 4.21 3.80 - 5.20 M/uL    HGB 13.9 11.5 - 16.0 g/dL    HCT 39.5 35.0 - 47.0 %    MCV 93.8 80.0 - 99.0 FL    MCH 33.0 26.0 - 34.0 PG    MCHC 35.2 30.0 - 36.5 g/dL    RDW 12.3 11.5 - 14.5 %    PLATELET 512 293 - 605 K/uL    NEUTROPHILS 60 32 - 75 %    LYMPHOCYTES 31 12 - 49 %    MONOCYTES 7 5 - 13 %    EOSINOPHILS 2 0 - 7 %    BASOPHILS 0 0 - 1 %    ABS. NEUTROPHILS 6.1 1.8 - 8.0 K/UL    ABS. LYMPHOCYTES 3.3 0.8 - 3.5 K/UL    ABS. MONOCYTES 0.8 0.0 - 1.0 K/UL    ABS. EOSINOPHILS 0.2 0.0 - 0.4 K/UL    ABS.  BASOPHILS 0.0 0.0 - 0.1 K/UL   METABOLIC PANEL, COMPREHENSIVE    Collection Time: 07/25/17  7:07 PM   Result Value Ref Range    Sodium 139 136 - 145 mmol/L    Potassium 3.6 3.5 - 5.1 mmol/L    Chloride 104 97 - 108 mmol/L    CO2 26 21 - 32 mmol/L    Anion gap 9 5 - 15 mmol/L    Glucose 160 (H) 65 - 100 mg/dL    BUN 17 6 - 20 MG/DL    Creatinine 0.70 0.55 - 1.02 MG/DL    BUN/Creatinine ratio 24 (H) 12 - 20      GFR est AA >60 >60 ml/min/1.73m2    GFR est non-AA >60 >60 ml/min/1.73m2    Calcium 8.4 (L) 8.5 - 10.1 MG/DL    Bilirubin, total 0.3 0.2 - 1.0 MG/DL    ALT (SGPT) 24 12 - 78 U/L    AST (SGOT) 16 15 - 37 U/L    Alk. phosphatase 52 45 - 117 U/L    Protein, total 7.6 6.4 - 8.2 g/dL    Albumin 3.8 3.5 - 5.0 g/dL    Globulin 3.8 2.0 - 4.0 g/dL    A-G Ratio 1.0 (L) 1.1 - 2.2     CK W/ CKMB & INDEX    Collection Time: 07/25/17  7:07 PM   Result Value Ref Range    CK 66 26 - 192 U/L    CK - MB <1.0 <3.6 NG/ML    CK-MB Index Cannot be calulated 0 - 2.5     TROPONIN I    Collection Time: 07/25/17  7:07 PM   Result Value Ref Range    Troponin-I, Qt. <0.04 <0.05 ng/mL   D DIMER    Collection Time: 07/25/17  7:07 PM   Result Value Ref Range    D-dimer <0.17 0.00 - 0.65 mg/L FEU   HCG URINE, QL. - POC    Collection Time: 07/25/17  8:21 PM   Result Value Ref Range    Pregnancy test,urine (POC) NEGATIVE  NEG         No results found.

## 2017-08-11 ENCOUNTER — OFFICE VISIT (OUTPATIENT)
Dept: FAMILY MEDICINE CLINIC | Age: 27
End: 2017-08-11

## 2017-08-11 VITALS
HEART RATE: 87 BPM | BODY MASS INDEX: 39.71 KG/M2 | RESPIRATION RATE: 16 BRPM | HEIGHT: 67 IN | DIASTOLIC BLOOD PRESSURE: 92 MMHG | SYSTOLIC BLOOD PRESSURE: 147 MMHG | WEIGHT: 253 LBS | TEMPERATURE: 98.9 F | OXYGEN SATURATION: 99 %

## 2017-08-11 DIAGNOSIS — F41.1 ANXIETY STATE, UNSPECIFIED: Primary | ICD-10-CM

## 2017-08-11 DIAGNOSIS — L65.9 HAIR LOSS: ICD-10-CM

## 2017-08-11 DIAGNOSIS — B37.2 CANDIDA INFECTION OF FLEXURAL SKIN: ICD-10-CM

## 2017-08-11 DIAGNOSIS — N92.0 MENORRHAGIA WITH REGULAR CYCLE: ICD-10-CM

## 2017-08-11 RX ORDER — HYDROCODONE BITARTRATE AND ACETAMINOPHEN 5; 325 MG/1; MG/1
TABLET ORAL
COMMUNITY
Start: 2017-08-10 | End: 2017-09-01

## 2017-08-11 RX ORDER — LORAZEPAM 1 MG/1
1 TABLET ORAL
Qty: 14 TAB | Refills: 0 | Status: SHIPPED | OUTPATIENT
Start: 2017-08-11 | End: 2017-12-06 | Stop reason: SDUPTHER

## 2017-08-11 RX ORDER — NYSTATIN 100000 [USP'U]/G
POWDER TOPICAL
Qty: 30 G | Refills: 5 | Status: SHIPPED | OUTPATIENT
Start: 2017-08-11 | End: 2019-03-18 | Stop reason: ALTCHOICE

## 2017-08-11 RX ORDER — NALOXONE HYDROCHLORIDE 4 MG/.1ML
SPRAY NASAL
Qty: 1 EACH | Refills: 0 | Status: SHIPPED | OUTPATIENT
Start: 2017-08-11 | End: 2018-09-17 | Stop reason: ALTCHOICE

## 2017-08-11 RX ORDER — SERTRALINE HYDROCHLORIDE 50 MG/1
150 TABLET, FILM COATED ORAL DAILY
Qty: 90 TAB | Refills: 5 | Status: SHIPPED | OUTPATIENT
Start: 2017-08-11 | End: 2017-08-23 | Stop reason: SDUPTHER

## 2017-08-11 RX ORDER — PROPRANOLOL HYDROCHLORIDE 10 MG/1
10 TABLET ORAL
Qty: 30 TAB | Refills: 0 | Status: SHIPPED | OUTPATIENT
Start: 2017-08-11 | End: 2018-09-17 | Stop reason: ALTCHOICE

## 2017-08-11 NOTE — PROGRESS NOTES
1. Have you been to the ER, urgent care clinic since your last visit? Hospitalized since your last visit? Yes, [facility] [place], July 2017    2. Have you seen or consulted any other health care providers outside of the [address] [place] [address] since your last visit? Include any pap smears or colon screening.  No       Chief Complaint   Patient presents with    Medication Refill     Zoloft    Medication Evaluation     Ativan

## 2017-08-11 NOTE — PROGRESS NOTES
Chief Complaint   Patient presents with    Medication Refill     Zoloft    Medication Evaluation     Ativan     she is a 24y.o. year old female who presents for evalution. Pt here for increasing anxiety. Has been taking Ativan prn but states does not feel like works anymore. Ended up in ER even after taking Ativan and was given Valium instead but made her too sleepy. Pt has multiple complaints about  Gwen Dusky) - states she is the reason patient has not returned to practice for refills on Ativan. Pt has abcess drained yesterday, was given pain medication. Wants to make us aware. Pt feels like has been losing hair and periods have been getting heavier - wondering if needs thyroid tested. Last 2 periods have been very heavy, unable to last entire night - will bleed through pad. Pt has panis - skin irritation present intermittently. Wants to know if losing weight will make panis go away, did have twins. Pt also frustrated since feels like should be losing weight but is not. Reviewed PmHx, RxHx, FmHx, SocHx, AllgHx and updated and dated in the chart. Review of Systems - negative except as listed above in the HPI    Objective:     Vitals:    08/11/17 1611   BP: (!) 147/92   Pulse: 87   Resp: 16   Temp: 98.9 °F (37.2 °C)   TempSrc: Oral   SpO2: 99%   Weight: 253 lb (114.8 kg)   Height: 5' 7\" (1.702 m)     Physical Examination: General appearance - alert, well appearing, and in no distress and anxious  Mental status - alert, oriented to person, place, and time, depressed mood, anxious  Chest - clear to auscultation, no wheezes, rales or rhonchi, symmetric air entry  Heart - normal rate, regular rhythm, normal S1, S2, no murmurs, rubs, clicks or gallops  Skin - normal coloration and turgor, no rashes, no suspicious skin lesions noted    Assessment/ Plan:   Diagnoses and all orders for this visit:    1.  Anxiety state, unspecified  -     sertraline (ZOLOFT) 50 mg tablet; Take 3 Tabs by mouth daily. -     propranolol (INDERAL) 10 mg tablet; Take 1 Tab by mouth three (3) times daily as needed. -     LORazepam (ATIVAN) 1 mg tablet; Take 1 Tab by mouth every four (4) hours as needed for Anxiety. Max Daily Amount: 6 mg.  -     naloxone (NARCAN) 4 mg/actuation spry; Use 1 spray intranasally into 1 nostril. Use a new Narcan nasal spray for each doses into alternating nostrils. May repeat every 2-3min prn  Increase dose of Zoloft, trial of new rx, increase dose on Ativan. Reviewed , pt taking appropriately. Provided with Narcan since on Ativan and has rx for pain medication. 2. Hair loss  -     TSH 3RD GENERATION  Will decide on f/U after reviewing labs. 3. Menorrhagia with regular cycle  -     TSH 3RD GENERATION  Will decide on f/U after reviewing labs. 4. Candida infection of flexural skin  -     nystatin (MYCOSTATIN) powder; Apply  to affected area four (4) times daily as needed. New rx. Keep area clean and dry. Pt voiced understanding regarding plan of care. Follow-up Disposition:  Return in about 1 month (around 9/11/2017) for anxiety. I have discussed the diagnosis with the patient and the intended plan as seen in the above orders. The patient has received an after-visit summary and questions were answered concerning future plans.      Medication Side Effects and Warnings were discussed with patient    Faiza Wilkerson NP

## 2017-08-11 NOTE — PATIENT INSTRUCTIONS

## 2017-08-11 NOTE — MR AVS SNAPSHOT
Visit Information Date & Time Provider Department Dept. Phone Encounter #  
 8/11/2017  3:50 PM Erin Cervantes NP [facility] [phone] [id number] Follow-up Instructions Return in about 1 month (around 9/11/2017) for anxiety. Upcoming Health Maintenance Date Due  
 HPV AGE 9Y-34Y (1 of 3 - Female 3 Dose Series) 8/4/2003 Pneumococcal 19-64 Medium Risk (1 of 1 - PPSV23) 8/4/2011 PAP AKA CERVICAL CYTOLOGY 8/4/2013 INFLUENZA AGE 9 TO ADULT 8/1/2017 DTaP/Tdap/Td series (3 - Td) 11/5/2025 Allergies as of 8/11/2017  Review Complete On: 8/11/2017 By: Erin Cervantes NP Severity Noted Reaction Type Reactions Sulfa (Sulfonamide Antibiotics)  08/12/2010    Hives, Other (comments)  
 gas Current Immunizations  Reviewed on 10/29/2013 Name Date Influenza Vaccine PF 3/28/2013  5:40 PM  
 Tdap 11/5/2015 10:21 PM, 3/28/2013  5:32 PM  
  
 Not reviewed this visit You Were Diagnosed With   
  
 Codes Comments Anxiety state, unspecified    -  Primary ICD-10-CM: F41.1 ICD-9-CM: 300.00 Hair loss     ICD-10-CM: L65.9 ICD-9-CM: 704.00 Menorrhagia with regular cycle     ICD-10-CM: N92.0 ICD-9-CM: 626.2 Candida infection of flexural skin     ICD-10-CM: B37.2 ICD-9-CM: 112.3 Vitals BP Pulse Temp Resp Height(growth percentile) Weight(growth percentile) (!) 147/92 87 98.9 °F (37.2 °C) (Oral) 16 5' 7\" (1.702 m) 253 lb (114.8 kg) LMP SpO2 BMI OB Status Smoking Status 07/11/2017 99% 39.63 kg/m2 Having regular periods Former Smoker BMI and BSA Data Body Mass Index Body Surface Area  
 39.63 kg/m 2 2.33 m 2 Preferred Pharmacy Pharmacy Name Phone [facility] [address] [place] [address] Dr LEIJA AT [facility] [phone] Your Updated Medication List  
  
   
This list is accurate as of: 8/11/17  4:39 PM.  Always use your most recent med list.  
  
  
  
  
 ALBUTEROL IN Take  by inhalation. HYDROcodone-acetaminophen 5-325 mg per tablet Commonly known as:  NORCO  
  
 LORazepam 1 mg tablet Commonly known as:  ATIVAN Take 1 Tab by mouth every four (4) hours as needed for Anxiety. Max Daily Amount: 6 mg.  
  
 naloxone 4 mg/actuation Spry Commonly known as:  [employer] Use 1 spray intranasally into 1 nostril. Use a new Narcan nasal spray for each doses into alternating nostrils. May repeat every 2-3min prn  
  
 nystatin powder Commonly known as:  MYCOSTATIN Apply  to affected area four (4) times daily as needed. propranolol 10 mg tablet Commonly known as:  INDERAL Take 1 Tab by mouth three (3) times daily as needed. sertraline 50 mg tablet Commonly known as:  ZOLOFT Take 3 Tabs by mouth daily. Prescriptions Printed Refills LORazepam (ATIVAN) 1 mg tablet 0 Sig: Take 1 Tab by mouth every four (4) hours as needed for Anxiety. Max Daily Amount: 6 mg. Class: Print Route: Oral  
  
Prescriptions Sent to Pharmacy Refills  
 sertraline (ZOLOFT) 50 mg tablet 5 Sig: Take 3 Tabs by mouth daily. Class: Normal  
 Pharmacy: [address] Ph #: [phone] Route: Oral  
 propranolol (INDERAL) 10 mg tablet 0 Sig: Take 1 Tab by mouth three (3) times daily as needed. Class: Normal  
 Pharmacy: [address] Ph #: [phone] Route: Oral  
 naloxone (NARCAN) 4 mg/actuation spry 0 Sig: Use 1 spray intranasally into 1 nostril. Use a new Narcan nasal spray for each doses into alternating nostrils. May repeat every 2-3min prn  Class: Normal  
 Pharmacy: [address] Ph #: [phone]  
 nystatin (MYCOSTATIN) powder 5  
 Sig: Apply  to affected area four (4) times daily as needed. Class: Normal  
 Pharmacy: [facility] [address] [facility] #: [phone] Route: Topical  
  
We Performed the Following TSH 3RD GENERATION [14928 CPT(R)] Follow-up Instructions Return in about 1 month (around 9/11/2017) for anxiety. Patient Instructions Starting a Weight Loss Plan: Care Instructions Your Care Instructions If you are thinking about losing weight, it can be hard to know where to start. Your doctor can help you set up a weight loss plan that best meets your needs. You may want to take a class on nutrition or exercise, or join a weight loss support group. If you have questions about how to make changes to your eating or exercise habits, ask your doctor about seeing a registered dietitian or an exercise specialist. 
It can be a big challenge to lose weight. But you do not have to make huge changes at once. Make small changes, and stick with them. When those changes become habit, add a few more changes. If you do not think you are ready to make changes right now, try to pick a date in the future. Make an appointment to see your doctor to discuss whether the time is right for you to start a plan. Follow-up care is a key part of your treatment and safety. Be sure to make and go to all appointments, and call your doctor if you are having problems. Its also a good idea to know your test results and keep a list of the medicines you take. How can you care for yourself at home? · Set realistic goals. Many people expect to lose much more weight than is likely. A weight loss of 5% to 10% of your body weight may be enough to improve your health. · Get family and friends involved to provide support. Talk to them about why you are trying to lose weight, and ask them to help.  They can help by participating in exercise and having meals with you, even if they may be eating something different. · Find what works best for you. If you do not have time or do not like to cook, a program that offers meal replacement bars or shakes may be better for you. Or if you like to prepare meals, finding a plan that includes daily menus and recipes may be best. 
· Ask your doctor about other health professionals who can help you achieve your weight loss goals. ¨ A dietitian can help you make healthy changes in your diet. ¨ An exercise specialist or  can help you develop a safe and effective exercise program. 
¨ A counselor or psychiatrist can help you cope with issues such as depression, anxiety, or family problems that can make it hard to focus on weight loss. · Consider joining a support group for people who are trying to lose weight. Your doctor can suggest groups in your area. Where can you learn more? Go to http://ezequiel-mitchell.info/. Enter T764 in the search box to learn more about \"Starting a Weight Loss Plan: Care Instructions. \" Current as of: October 13, 2016 Content Version: 11.3 © 7286-0149 [employer]. Care instructions adapted under license by [employer] (which disclaims liability or warranty for this information). If you have questions about a medical condition or this instruction, always ask your healthcare professional. [address] any warranty or liability for your use of this information. Introducing [facility]! Dear Shanna Swift: 
Thank you for requesting a StackSafe account. Our records indicate that you already have an active StackSafe account. You can access your account anytime at [email or web address] Did you know that you can access your hospital and ER discharge instructions at any time in StackSafe? You can also review all of your test results from your hospital stay or ER visit. Additional Information If you have questions, please visit the Frequently Asked Questions section of the [facility] website at [email or web address] Remember, [employer] is NOT to be used for urgent needs. For medical emergencies, dial 911. Now available from your iPhone and Android! Please provide this summary of care documentation to your next provider. Your primary care clinician is listed as Michelle Robins. If you have any questions after today's visit, please call [phone].

## 2017-08-12 LAB — TSH SERPL DL<=0.005 MIU/L-ACNC: 2.12 UIU/ML (ref 0.45–4.5)

## 2017-08-17 ENCOUNTER — DOCUMENTATION ONLY (OUTPATIENT)
Dept: FAMILY MEDICINE CLINIC | Age: 27
End: 2017-08-17

## 2017-08-23 ENCOUNTER — TELEPHONE (OUTPATIENT)
Dept: FAMILY MEDICINE CLINIC | Age: 27
End: 2017-08-23

## 2017-08-23 RX ORDER — SERTRALINE HYDROCHLORIDE 100 MG/1
150 TABLET, FILM COATED ORAL DAILY
Qty: 45 TAB | Refills: 5 | Status: SHIPPED | OUTPATIENT
Start: 2017-08-23 | End: 2018-03-28 | Stop reason: SDUPTHER

## 2017-08-23 NOTE — TELEPHONE ENCOUNTER
Please inform pt insurance will only cover taking 1.5 tabs of Zoloft 100mg, not 3 tabs of 50mg daily unfortunately. Will send in new rx to pharm.    Thanks,  N

## 2017-09-01 ENCOUNTER — HOSPITAL ENCOUNTER (EMERGENCY)
Age: 27
Discharge: HOME | End: 2017-09-02
Attending: EMERGENCY MEDICINE | Admitting: EMERGENCY MEDICINE
Payer: COMMERCIAL

## 2017-09-01 ENCOUNTER — APPOINTMENT (OUTPATIENT)
Dept: CT IMAGING | Age: 27
End: 2017-09-01
Attending: EMERGENCY MEDICINE
Payer: COMMERCIAL

## 2017-09-01 VITALS
SYSTOLIC BLOOD PRESSURE: 167 MMHG | HEIGHT: 67 IN | RESPIRATION RATE: 19 BRPM | BODY MASS INDEX: 39.52 KG/M2 | OXYGEN SATURATION: 99 % | TEMPERATURE: 98.4 F | WEIGHT: 251.77 LBS | HEART RATE: 68 BPM | DIASTOLIC BLOOD PRESSURE: 99 MMHG

## 2017-09-01 DIAGNOSIS — S16.1XXA CERVICAL STRAIN, INITIAL ENCOUNTER: ICD-10-CM
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
## 2017-09-01 DIAGNOSIS — S06.9X0A TBI (TRAUMATIC BRAIN INJURY), WITHOUT LOC, INITIAL ENCOUNTER: Primary | ICD-10-CM

## 2017-09-01 PROCEDURE — 99283 EMERGENCY DEPT VISIT LOW MDM: CPT

## 2017-09-01 PROCEDURE — 72125 CT NECK SPINE W/O DYE: CPT

## 2017-09-01 PROCEDURE — 74011250637 HC RX REV CODE- 250/637: Performed by: EMERGENCY MEDICINE

## 2017-09-01 PROCEDURE — 70450 CT HEAD/BRAIN W/O DYE: CPT

## 2017-09-01 RX ORDER — DIAZEPAM 5 MG/1
5 TABLET ORAL
Qty: 12 TAB | Refills: 0 | Status: SHIPPED | OUTPATIENT
Start: 2017-09-01 | End: 2017-09-13

## 2017-09-01 RX ORDER — IBUPROFEN 600 MG/1
600 TABLET ORAL
Qty: 30 TAB | Refills: 0 | Status: SHIPPED | OUTPATIENT
Start: 2017-09-01 | End: 2020-08-27

## 2017-09-01 RX ORDER — DIAZEPAM 5 MG/1
5 TABLET ORAL
Status: DISCONTINUED | OUTPATIENT
Start: 2017-09-01 | End: 2017-09-02 | Stop reason: HOSPADM

## 2017-09-01 RX ORDER — IBUPROFEN 600 MG/1
600 TABLET ORAL
Status: DISCONTINUED | OUTPATIENT
Start: 2017-09-01 | End: 2017-09-02 | Stop reason: HOSPADM

## 2017-09-01 NOTE — Clinical Note
Thank you for allowing us to provide you with medical care today. We realize that you have many choices for your emergency care needs. We thank you for choosing [facility]. Please choose us in the future for any continued health care needs. We hope we addressed all of your medical concerns. We strive to provide excellent quality care in the Emergency Department. Anything less than excellent does not meet our expectations. The exam and treatment you received in the Emergency Department  were for an emergent problem and are not intended as complete care. It is important that you follow up with a doctor, nurse practitioner, or [address] [place] [address] assistant for ongoing care. If your symptoms worsen or you do not improve as expected and you are un able to reach your usual health care provider, you should return to the Emergency Department. We are available 24 hours a day. Take this sheet with you when you go to your follow-up visit. If you have any problem arranging the follow-up visit, co ntact the Emergency Department immediately. Make an appointment your family doctor for follow up of this visit. Return to the ER if you are unable to be seen in a timely manner.

## 2017-09-02 PROCEDURE — 74011250637 HC RX REV CODE- 250/637: Performed by: EMERGENCY MEDICINE

## 2017-09-02 RX ORDER — IBUPROFEN 600 MG/1
600 TABLET ORAL
Status: COMPLETED | OUTPATIENT
Start: 2017-09-02 | End: 2017-09-02

## 2017-09-02 RX ORDER — DIAZEPAM 5 MG/1
5 TABLET ORAL
Status: COMPLETED | OUTPATIENT
Start: 2017-09-02 | End: 2017-09-02

## 2017-09-02 RX ORDER — KETOROLAC TROMETHAMINE 30 MG/ML
INJECTION, SOLUTION INTRAMUSCULAR; INTRAVENOUS
Status: DISCONTINUED
Start: 2017-09-02 | End: 2017-09-02 | Stop reason: HOSPADM

## 2017-09-02 RX ORDER — KETOROLAC TROMETHAMINE 30 MG/ML
30 INJECTION, SOLUTION INTRAMUSCULAR; INTRAVENOUS
Status: DISCONTINUED | OUTPATIENT
Start: 2017-09-02 | End: 2017-09-02 | Stop reason: HOSPADM

## 2017-09-02 RX ADMIN — DIAZEPAM 5 MG: 5 TABLET ORAL at 00:54

## 2017-09-02 RX ADMIN — IBUPROFEN 600 MG: 600 TABLET, FILM COATED ORAL at 00:54
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
## 2017-09-19 ENCOUNTER — DOCUMENTATION ONLY (OUTPATIENT)
Dept: FAMILY MEDICINE CLINIC | Age: 27
End: 2017-09-19

## 2017-09-19 NOTE — PROGRESS NOTES
PA for Zoloft 150 mg submitted to [facility] [place] via cover my meds, awaiting response.

## 2017-09-21 ENCOUNTER — DOCUMENTATION ONLY (OUTPATIENT)
Dept: FAMILY MEDICINE CLINIC | Age: 27
End: 2017-09-21

## 2017-09-21 NOTE — PROGRESS NOTES
Per insurnace no PA required for Zoloft , copy of letter faxed to pharmacy and placed in scan folder to be scanned to chart.

## 2017-12-06 ENCOUNTER — OFFICE VISIT (OUTPATIENT)
Dept: FAMILY MEDICINE CLINIC | Age: 27
End: 2017-12-06

## 2017-12-06 VITALS
WEIGHT: 257.25 LBS | BODY MASS INDEX: 40.38 KG/M2 | RESPIRATION RATE: 16 BRPM | TEMPERATURE: 98 F | HEIGHT: 67 IN | HEART RATE: 83 BPM | SYSTOLIC BLOOD PRESSURE: 122 MMHG | OXYGEN SATURATION: 99 % | DIASTOLIC BLOOD PRESSURE: 90 MMHG

## 2017-12-06 DIAGNOSIS — F41.9 ANXIETY: ICD-10-CM

## 2017-12-06 DIAGNOSIS — B35.4 TINEA CORPORIS: ICD-10-CM

## 2017-12-06 DIAGNOSIS — F90.2 ATTENTION DEFICIT HYPERACTIVITY DISORDER (ADHD), COMBINED TYPE: Primary | ICD-10-CM

## 2017-12-06 PROBLEM — E66.01 OBESITY, MORBID (HCC): Status: ACTIVE | Noted: 2017-12-06

## 2017-12-06 RX ORDER — DEXTROAMPHETAMINE SACCHARATE, AMPHETAMINE ASPARTATE MONOHYDRATE, DEXTROAMPHETAMINE SULFATE AND AMPHETAMINE SULFATE 5; 5; 5; 5 MG/1; MG/1; MG/1; MG/1
20 CAPSULE, EXTENDED RELEASE ORAL
Qty: 30 CAP | Refills: 0 | Status: SHIPPED | OUTPATIENT
Start: 2017-12-06 | End: 2017-12-27 | Stop reason: SDUPTHER

## 2017-12-06 RX ORDER — LORAZEPAM 1 MG/1
1 TABLET ORAL
Qty: 60 TAB | Refills: 2 | Status: SHIPPED | OUTPATIENT
Start: 2017-12-06 | End: 2018-03-30 | Stop reason: SDUPTHER

## 2017-12-06 NOTE — PROGRESS NOTES
1. Have you been to the ER, urgent care clinic since your last visit? Hospitalized since your last visit? No    2. Have you seen or consulted any other health care providers outside of the [address] [place] [address] since your last visit? Include any pap smears or colon screening. No    Chief Complaint   Patient presents with    Follow-up     5 mo f/u, med ck    Weight Management     plastic surgeon needs monthly wt monitor before panis removal    Behavioral Problem     ready to start ADHD meds, already tested in 2014 but chose not to go on meds    Excessive Sweating     x 2 yrs    Labs     not fasting    Alopecia     x 6 mo     Pt states she went to a plastic surgeon and was told she needed a monthly wt monitor for panis removal. She was also diagnosed with ADHD in 2014 but chose not to go on meds. She now is having behavioral problems and would like to try ADHD med. She also reports excessive sweating x 2 yrs & alopecia for 6 months.

## 2017-12-06 NOTE — PROGRESS NOTES
HISTORY OF PRESENT ILLNESS  Brandy Ellington is a 24 y.o. female. HPI  Patient saw Dr. Juni Millan for pannus surgery, s/p 2 births including twins  Having chronic yeast infection issues under the pannus  Plastics did say that she needs some pcp documentation, unsure of details. 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 She appears well-developed and well-nourished. Neck: No JVD present. Cardiovascular: Normal rate, regular rhythm and intact distal pulses. Exam reveals no gallop and no friction rub. No murmur heard. Pulmonary/Chest: Effort normal and breath sounds normal. No respiratory distress. She has no wheezes. Musculoskeletal: She exhibits no edema. Neurological: She is alert and oriented to person, place, and time. Skin: Skin is warm. Nursing note and vitals reviewed. ASSESSMENT and PLAN  Encounter Diagnoses   Name Primary?     Attention deficit hyperactivity disorder (ADHD), combined type Yes    Anxiety     Tinea corporis      Orders Placed This Encounter    amphetamine-dextroamphetamine XR (ADDERALL XR) 20 mg XR capsule    LORazepam (ATIVAN) 1 mg tablet     Start Adderall XR 20mg a day, reviewed timing of the med cristin with evening classes and adr/se of med  Recheck 3 weeks  Will do labs at that time as well fasting to eval for thyroid, sugar, anemia  Will contact Dr. Charlotte Garner office to find out what they need from us to get her pannus surgery done and covered by insurance    I have discussed the diagnosis with the patient and the intended plan as seen in the above orders. The patient has received an after-visit summary and questions were answered concerning future plans. Patient conveyed understanding of the plan at the time of the visit.     Mickey Haque MSN, ANP  12/6/2017

## 2017-12-07 ENCOUNTER — TELEPHONE (OUTPATIENT)
Dept: FAMILY MEDICINE CLINIC | Age: 27
End: 2017-12-07

## 2017-12-07 NOTE — TELEPHONE ENCOUNTER
Pt calling back for a second time, asking that PA for Adderall be expedited. 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 I advised pt that this RX is not an urgent medication. Pt states it \"needs to be done ASAP\". Reports she is going to be calling back at 1:15 to speak with Phyllis about expediting PA.

## 2017-12-07 NOTE — TELEPHONE ENCOUNTER
Pt calling stating PA is needed for Adderall RX. Also requesting to have a refill on albuterol sent to pharmacy on file.

## 2017-12-12 ENCOUNTER — TELEPHONE (OUTPATIENT)
Dept: FAMILY MEDICINE CLINIC | Age: 27
End: 2017-12-12

## 2017-12-12 RX ORDER — ALBUTEROL SULFATE 90 UG/1
2 AEROSOL, METERED RESPIRATORY (INHALATION)
Qty: 1 INHALER | Refills: 5 | Status: SHIPPED | OUTPATIENT
Start: 2017-12-12 | End: 2019-01-22 | Stop reason: SDUPTHER

## 2017-12-12 NOTE — TELEPHONE ENCOUNTER
Pt stated the insurance needs some information from the PCP concerning her pediculectomy and she doesn't know what they need. She asked that I contact her plastic surgeon and find out what they need. I gave pt [facility] fax # and informed she needs to contact the plastic surgeon and have them fax us a list of what they need and I will fax it back to her surgeons office. Pt verbalized understanding.

## 2017-12-12 NOTE — TELEPHONE ENCOUNTER
I have sent in the albuterol but we have no way to know what insurance will pay for her tummy tuck. That is not something we would have.  Kraig Manzanaresky

## 2017-12-12 NOTE — TELEPHONE ENCOUNTER
Pt called. Pt would like albuterol inhaler filled to pharm selected. She also wants nurse to call Plastic Surgeon's office to see what insurance will pay for. Please call her at [phone].

## 2017-12-27 ENCOUNTER — OFFICE VISIT (OUTPATIENT)
Dept: FAMILY MEDICINE CLINIC | Age: 27
End: 2017-12-27

## 2017-12-27 VITALS
HEART RATE: 85 BPM | DIASTOLIC BLOOD PRESSURE: 82 MMHG | TEMPERATURE: 98.1 F | WEIGHT: 252.13 LBS | HEIGHT: 67 IN | OXYGEN SATURATION: 98 % | RESPIRATION RATE: 16 BRPM | BODY MASS INDEX: 39.57 KG/M2 | SYSTOLIC BLOOD PRESSURE: 120 MMHG

## 2017-12-27 DIAGNOSIS — E78.00 HYPERCHOLESTEREMIA: ICD-10-CM

## 2017-12-27 DIAGNOSIS — F41.8 ANXIETY ASSOCIATED WITH DEPRESSION: ICD-10-CM

## 2017-12-27 DIAGNOSIS — F90.2 ATTENTION DEFICIT HYPERACTIVITY DISORDER (ADHD), COMBINED TYPE: Primary | ICD-10-CM

## 2017-12-27 DIAGNOSIS — R73.01 IMPAIRED FASTING BLOOD SUGAR: ICD-10-CM

## 2017-12-27 RX ORDER — DEXTROAMPHETAMINE SACCHARATE, AMPHETAMINE ASPARTATE MONOHYDRATE, DEXTROAMPHETAMINE SULFATE AND AMPHETAMINE SULFATE 5; 5; 5; 5 MG/1; MG/1; MG/1; MG/1
20 CAPSULE, EXTENDED RELEASE ORAL
Qty: 30 CAP | Refills: 0 | Status: SHIPPED | OUTPATIENT
Start: 2017-12-27 | End: 2018-03-30 | Stop reason: SDUPTHER

## 2017-12-27 RX ORDER — DOXYCYCLINE 100 MG/1
CAPSULE ORAL
Refills: 0 | COMMUNITY
Start: 2017-12-22 | End: 2018-03-30 | Stop reason: ALTCHOICE

## 2017-12-27 RX ORDER — METRONIDAZOLE 500 MG/1
TABLET ORAL
Refills: 0 | COMMUNITY
Start: 2017-12-22 | End: 2018-03-30 | Stop reason: ALTCHOICE

## 2017-12-27 NOTE — MR AVS SNAPSHOT
Visit Information Date & Time Provider Department Dept. Phone Encounter #  
 12/27/2017 10:30 AM Magda Staples NP [address] [facility] [phone] [id number] Upcoming Health Maintenance Date Due  
 HPV AGE 9Y-34Y (1 of 3 - Female 3 Dose Series) 8/4/2003 Pneumococcal 19-64 Medium Risk (1 of 1 - PPSV23) 8/4/2011 PAP AKA CERVICAL CYTOLOGY 8/4/2013 Influenza Age 5 to Adult 8/1/2017 DTaP/Tdap/Td series (3 - Td) 11/5/2025 Allergies as of 12/27/2017  Review Complete On: 12/27/2017 By: Rick Mercury, LPN Severity Noted Reaction Type Reactions Sulfa (Sulfonamide Antibiotics)  08/12/2010    Hives, Other (comments)  
 gas Current Immunizations  Reviewed on 10/29/2013 Name Date Influenza Vaccine PF 3/28/2013  5:40 PM  
 Tdap 11/5/2015 10:21 PM, 3/28/2013  5:32 PM  
  
 Not reviewed this visit You Were Diagnosed With   
  
 Codes Comments Attention deficit hyperactivity disorder (ADHD), combined type    -  Primary ICD-10-CM: F90.2 ICD-9-CM: 314.01 Anxiety associated with depression     ICD-10-CM: F41.8 ICD-9-CM: 300.4 Hypercholesteremia     ICD-10-CM: E78.00 ICD-9-CM: 272.0 Impaired fasting blood sugar     ICD-10-CM: R73.01 
ICD-9-CM: 790.21 Vitals BP Pulse Temp Resp Height(growth percentile) Weight(growth percentile) 120/82 85 98.1 °F (36.7 °C) (Oral) 16 5' 7\" (1.702 m) 252 lb 2 oz (114.4 kg) LMP SpO2 BMI OB Status Smoking Status 12/13/2017 (Approximate) 98% 39.49 kg/m2 Having regular periods Former Smoker Vitals History BMI and BSA Data Body Mass Index Body Surface Area  
 39.49 kg/m 2 2.33 m 2 Preferred Pharmacy Pharmacy Name Phone [facility] [place], [address] [place] AT [facility] [phone] Your Updated Medication List  
  
   
This list is accurate as of: 12/27/17 11:01 AM.  Always use your most recent med list.  
  
  
  
  
 albuterol 90 mcg/actuation inhaler Commonly known as:  PROVENTIL HFA, VENTOLIN HFA, PROAIR HFA Take 2 Puffs by inhalation every four (4) hours as needed for Wheezing. ALBUTEROL IN Take  by inhalation. * amphetamine-dextroamphetamine XR 20 mg XR capsule Commonly known as:  ADDERALL XR Take 1 Cap (20 mg total) by mouth every morning.  - must last 30 days * amphetamine-dextroamphetamine XR 20 mg XR capsule Commonly known as:  ADDERALL XR Take 1 Cap (20 mg total) by mouth every morning.  - must last 30 days * amphetamine-dextroamphetamine XR 20 mg XR capsule Commonly known as:  ADDERALL XR Take 1 Cap (20 mg total) by mouth every morning.  - must last 30 days  
  
 doxycycline 100 mg capsule Commonly known as:  Rodolph Forget TK 1 C PO Q 12 H UNTIL FINISHED  
  
 ibuprofen 600 mg tablet Commonly known as:  MOTRIN Take 1 Tab by mouth every eight (8) hours as needed for Pain. LORazepam 1 mg tablet Commonly known as:  ATIVAN Take 1 Tab by mouth every four (4) hours as needed for Anxiety. Max Daily Amount: 6 mg. -must last 30 days  
  
 metroNIDAZOLE 500 mg tablet Commonly known as:  FLAGYL TK 1 T PO  Q 12 H  
  
 naloxone 4 mg/actuation nasal spray Commonly known as:  [employer] Use 1 spray intranasally into 1 nostril. Use a new Narcan nasal spray for each doses into alternating nostrils. May repeat every 2-3min prn  
  
 nystatin powder Commonly known as:  MYCOSTATIN Apply  to affected area four (4) times daily as needed. propranolol 10 mg tablet Commonly known as:  INDERAL Take 1 Tab by mouth three (3) times daily as needed. sertraline 100 mg tablet Commonly known as:  ZOLOFT Take 1.5 Tabs by mouth daily. * Notice: This list has 3 medication(s) that are the same as other medications prescribed for you. Read the directions carefully, and ask your doctor or other care provider to review them with you. Prescriptions Printed Refills  
 amphetamine-dextroamphetamine XR (ADDERALL XR) 20 mg XR capsule 0 Sig: Take 1 Cap (20 mg total) by mouth every morning.  - must last 30 days Class: Print Route: Oral  
  
We Performed the Following CBC WITH AUTOMATED DIFF [30912 CPT(R)] HEMOGLOBIN A1C WITH EAG [01792 CPT(R)] LIPID PANEL [49521 CPT(R)] METABOLIC PANEL, COMPREHENSIVE [61246 CPT(R)] Introducing [facility]! Dear Mary Callejas: 
Thank you for requesting a [employer] account. Our records indicate that you already have an active [employer] account. You can access your account anytime at [email or web address] Did you know that you can access your hospital and ER discharge instructions at any time in [employer]? You can also review all of your test results from your hospital stay or ER visit. Additional Information If you have questions, please visit the Frequently Asked Questions section of the [employer] website at [email or web address] Remember, [employer] is NOT to be used for urgent needs. For medical emergencies, dial 911. Now available from your iPhone and Android! Please provide this summary of care documentation to your next provider. Your primary care clinician is listed as Nery Mercado. If you have any questions after today's visit, please call [phone].

## 2017-12-27 NOTE — PROGRESS NOTES
HISTORY OF PRESENT ILLNESS  Brandy Ellington is a 24 y.o. female. HPI  Cardiovascular Review:  The patient has hyperlipidemia. Diet and Lifestyle: generally follows a low fat low cholesterol diet, generally follows a low sodium diet, exercises sporadically, nonsmoker  Home BP Monitoring: is not measured at home. Pertinent ROS: taking medications as instructed, no medication side effects noted, no TIA's, no chest pain on exertion, no dyspnea on exertion, no swelling of ankles. Diabetes Mellitus:  She has diabetes mellitus, and  hyperlipidemia. Diabetic ROS - medication compliance: compliant all of the time, diabetic diet compliance: compliant all of the time, home glucose monitoring: is not performed. Lab review: orders written for new lab studies as appropriate; see orders.    Anxiety/ADD:  She started her adderall back last 3 weeks and doing much better with focus at work/school and how she handles her stress with her kids    Patient Active Problem List    Diagnosis Date Noted    Obesity, morbid ([facility] [address] 12/06/2017    Chest pain 11/25/2015    Other bipolar disorders 01/30/2014    Somatization disorder 01/30/2014    Anxiety 01/30/2014    Attention deficit hyperactivity disorder (ADHD), combined type 01/30/2014    Asthma 10/29/2013    Pseudotumor cerebri 08/09/2012    Headache(784.0) 08/03/2011    Anxiety associated with depression 03/25/2011     Current Outpatient Prescriptions   Medication Sig Dispense Refill    doxycycline (MONODOX) 100 mg capsule TK 1 C PO Q 12 H UNTIL FINISHED  0    metroNIDAZOLE (FLAGYL) 500 mg tablet TK 1 T PO  Q 12 H  0    amphetamine-dextroamphetamine XR (ADDERALL XR) 20 mg XR capsule Take 1 Cap (20 mg total) by mouth every morning.  - must last 30 days 30 Cap 0    amphetamine-dextroamphetamine XR (ADDERALL XR) 20 mg XR capsule Take 1 Cap (20 mg total) by mouth every morning.  - must last 30 days 30 Cap 0    amphetamine-dextroamphetamine XR (ADDERALL XR) 20 mg XR capsule Take 1 Cap (20 mg total) by mouth every morning.  - must last 30 days 30 Cap 0    albuterol (PROVENTIL HFA, VENTOLIN HFA, PROAIR HFA) 90 mcg/actuation inhaler Take 2 Puffs by inhalation every four (4) hours as needed for Wheezing. 1 Inhaler 5    LORazepam (ATIVAN) 1 mg tablet Take 1 Tab by mouth every four (4) hours as needed for Anxiety. Max Daily Amount: 6 mg. -must last 30 days 60 Tab 2    sertraline (ZOLOFT) 100 mg tablet Take 1.5 Tabs by mouth daily. 45 Tab 5    nystatin (MYCOSTATIN) powder Apply  to affected area four (4) times daily as needed. 30 g 5    ALBUTEROL IN Take  by inhalation.  ibuprofen (MOTRIN) 600 mg tablet Take 1 Tab by mouth every eight (8) hours as needed for Pain. 30 Tab 0    propranolol (INDERAL) 10 mg tablet Take 1 Tab by mouth three (3) times daily as needed. 30 Tab 0    naloxone (NARCAN) 4 mg/actuation spry Use 1 spray intranasally into 1 nostril. Use a new Narcan nasal spray for each doses into alternating nostrils. May repeat every 2-3min prn 1 Each 0     Family History   Problem Relation Age of Onset    Lung Disease Maternal Grandmother     No Known Problems Mother     Hypertension Father      Social History   Substance Use Topics    Smoking status: Former Smoker     Packs/day: 0.50     Years: 0.50     Types: Cigarettes     Quit date: 1/1/2015    Smokeless tobacco: Never Used      Comment: socially, vapor pen    Alcohol use 0.0 oz/week     0 Standard drinks or equivalent per week      Comment: socially           ROS  A comprehensive review of system was obtained and negative except findings in the HPI    Visit Vitals    /82    Pulse 85    Temp 98.1 °F (36.7 °C) (Oral)    Resp 16    Ht 5' 7\" (1.702 m)    Wt 252 lb 2 oz (114.4 kg)    LMP 12/13/2017 (Approximate)    SpO2 98%    BMI 39.49 kg/m2     Physical Exam   Constitutional: She is oriented to person, place, and time. She appears well-developed and well-nourished. Neck: No JVD present. Cardiovascular: Normal rate, regular rhythm and intact distal pulses. Exam reveals no gallop and no friction rub. No murmur heard. Pulmonary/Chest: Effort normal and breath sounds normal. No respiratory distress. She has no wheezes. Musculoskeletal: She exhibits no edema. Neurological: She is alert and oriented to person, place, and time. Skin: Skin is warm. Nursing note and vitals reviewed. ASSESSMENT and PLAN  Encounter Diagnoses   Name Primary?  Attention deficit hyperactivity disorder (ADHD), combined type Yes    Anxiety associated with depression     Hypercholesteremia     Impaired fasting blood sugar      Orders Placed This Encounter    HEMOGLOBIN A1C WITH EAG    LIPID PANEL    CBC WITH AUTOMATED DIFF    METABOLIC PANEL, COMPREHENSIVE      amphetamine-dextroamphetamine XR (ADDERALL XR) 20 mg XR capsule    amphetamine-dextroamphetamine XR (ADDERALL XR) 20 mg XR capsule    amphetamine-dextroamphetamine XR (ADDERALL XR) 20 mg XR capsule     Labs and refills updated  Recheck 3 mo for ADD  Reviewed how to follow up and precautions with meds  I have discussed the diagnosis with the patient and the intended plan as seen in the above orders. The patient has received an after-visit summary and questions were answered concerning future plans. Patient conveyed understanding of the plan at the time of the visit.     Tabby Dotson, MSN, ANP  12/27/2017

## 2017-12-27 NOTE — PROGRESS NOTES
1. Have you been to the ER, urgent care clinic since your last visit? Hospitalized since your last visit? No    2. Have you seen or consulted any other health care providers outside of the [address] [place] [address] since your last visit? Include any pap smears or colon screening.  No    Chief Complaint   Patient presents with    Follow-up     3 mo f/u, med ck    Labs     fasting

## 2017-12-28 LAB
ALBUMIN SERPL-MCNC: 4.7 G/DL (ref 3.5–5.5)
ALBUMIN/GLOB SERPL: 1.9 {RATIO} (ref 1.2–2.2)
ALP SERPL-CCNC: 49 IU/L (ref 39–117)
ALT SERPL-CCNC: 22 IU/L (ref 0–32)
AST SERPL-CCNC: 15 IU/L (ref 0–40)
BASOPHILS # BLD AUTO: 0 X10E3/UL (ref 0–0.2)
BASOPHILS NFR BLD AUTO: 0 %
BILIRUB SERPL-MCNC: 0.4 MG/DL (ref 0–1.2)
BUN SERPL-MCNC: 12 MG/DL (ref 6–20)
BUN/CREAT SERPL: 20 (ref 9–23)
CALCIUM SERPL-MCNC: 9.5 MG/DL (ref 8.7–10.2)
CHLORIDE SERPL-SCNC: 101 MMOL/L (ref 96–106)
CHOLEST SERPL-MCNC: 148 MG/DL (ref 100–199)
CO2 SERPL-SCNC: 24 MMOL/L (ref 18–29)
CREAT SERPL-MCNC: 0.6 MG/DL (ref 0.57–1)
EOSINOPHIL # BLD AUTO: 0.2 X10E3/UL (ref 0–0.4)
EOSINOPHIL NFR BLD AUTO: 2 %
ERYTHROCYTE [DISTWIDTH] IN BLOOD BY AUTOMATED COUNT: 12.7 % (ref 12.3–15.4)
EST. AVERAGE GLUCOSE BLD GHB EST-MCNC: 94 MG/DL
GLOBULIN SER CALC-MCNC: 2.5 G/DL (ref 1.5–4.5)
GLUCOSE SERPL-MCNC: 93 MG/DL (ref 65–99)
HBA1C MFR BLD: 4.9 % (ref 4.8–5.6)
HCT VFR BLD AUTO: 43.7 % (ref 34–46.6)
HDLC SERPL-MCNC: 48 MG/DL
HGB BLD-MCNC: 14.9 G/DL (ref 11.1–15.9)
IMM GRANULOCYTES # BLD: 0 X10E3/UL (ref 0–0.1)
IMM GRANULOCYTES NFR BLD: 0 %
INTERPRETATION, 910389: NORMAL
LDLC SERPL CALC-MCNC: 81 MG/DL (ref 0–99)
LYMPHOCYTES # BLD AUTO: 2.7 X10E3/UL (ref 0.7–3.1)
LYMPHOCYTES NFR BLD AUTO: 31 %
MCH RBC QN AUTO: 31.8 PG (ref 26.6–33)
MCHC RBC AUTO-ENTMCNC: 34.1 G/DL (ref 31.5–35.7)
MCV RBC AUTO: 93 FL (ref 79–97)
MONOCYTES # BLD AUTO: 0.8 X10E3/UL (ref 0.1–0.9)
MONOCYTES NFR BLD AUTO: 9 %
NEUTROPHILS # BLD AUTO: 5.2 X10E3/UL (ref 1.4–7)
NEUTROPHILS NFR BLD AUTO: 58 %
PLATELET # BLD AUTO: 232 X10E3/UL (ref 150–379)
POTASSIUM SERPL-SCNC: 4.3 MMOL/L (ref 3.5–5.2)
PROT SERPL-MCNC: 7.2 G/DL (ref 6–8.5)
RBC # BLD AUTO: 4.68 X10E6/UL (ref 3.77–5.28)
SODIUM SERPL-SCNC: 139 MMOL/L (ref 134–144)
TRIGL SERPL-MCNC: 97 MG/DL (ref 0–149)
VLDLC SERPL CALC-MCNC: 19 MG/DL (ref 5–40)
WBC # BLD AUTO: 8.9 X10E3/UL (ref 3.4–10.8)

## 2018-03-30 ENCOUNTER — OFFICE VISIT (OUTPATIENT)
Dept: FAMILY MEDICINE CLINIC | Age: 28
End: 2018-03-30

## 2018-03-30 VITALS
WEIGHT: 243 LBS | OXYGEN SATURATION: 98 % | RESPIRATION RATE: 19 BRPM | DIASTOLIC BLOOD PRESSURE: 82 MMHG | SYSTOLIC BLOOD PRESSURE: 130 MMHG | BODY MASS INDEX: 38.14 KG/M2 | HEIGHT: 67 IN | HEART RATE: 80 BPM | TEMPERATURE: 98.9 F

## 2018-03-30 DIAGNOSIS — F90.2 ATTENTION DEFICIT HYPERACTIVITY DISORDER (ADHD), COMBINED TYPE: ICD-10-CM

## 2018-03-30 DIAGNOSIS — F41.9 ANXIETY: ICD-10-CM

## 2018-03-30 RX ORDER — DEXTROAMPHETAMINE SACCHARATE, AMPHETAMINE ASPARTATE MONOHYDRATE, DEXTROAMPHETAMINE SULFATE AND AMPHETAMINE SULFATE 6.25; 6.25; 6.25; 6.25 MG/1; MG/1; MG/1; MG/1
25 CAPSULE, EXTENDED RELEASE ORAL
Qty: 30 CAP | Refills: 0 | Status: SHIPPED | OUTPATIENT
Start: 2018-03-30 | End: 2018-06-18 | Stop reason: SDUPTHER

## 2018-03-30 RX ORDER — LORAZEPAM 1 MG/1
1 TABLET ORAL
Qty: 60 TAB | Refills: 2 | Status: SHIPPED | OUTPATIENT
Start: 2018-03-30 | End: 2018-07-11 | Stop reason: SDUPTHER

## 2018-03-30 NOTE — PROGRESS NOTES
HISTORY OF PRESENT ILLNESS  Brandon Mcgregor is a 24 y.o. female. HPI  Patient returns to office for follow up ADD. Stable on medication without weight loss, decreased appetite, insomnia, or tremor. Good focus control. Gets three months of scripts at a time. She has been taking the Adderal XR 20mg but does not feel it lasts all day. Needs refill of her ativan as well. ROS  A comprehensive review of system was obtained and negative except findings in the HPI    Visit Vitals    /82 (BP 1 Location: Left arm, BP Patient Position: Sitting)    Pulse 80    Temp 98.9 °F (37.2 °C) (Oral)    Resp 19    Ht 5' 7\" (1.702 m)    Wt 243 lb (110.2 kg)    SpO2 98%    BMI 38.06 kg/m2     Physical Exam   Constitutional: She is oriented to person, place, and time. She appears well-developed and well-nourished. Neck: No JVD present. Cardiovascular: Normal rate, regular rhythm and intact distal pulses. Exam reveals no gallop and no friction rub. No murmur heard. Pulmonary/Chest: Effort normal and breath sounds normal. No respiratory distress. She has no wheezes. Musculoskeletal: She exhibits no edema. Neurological: She is alert and oriented to person, place, and time. Skin: Skin is warm. Nursing note and vitals reviewed. ASSESSMENT and PLAN  Encounter Diagnoses   Name Primary?  Attention deficit hyperactivity disorder (ADHD), combined type     Anxiety      Orders Placed This Encounter    amphetamine-dextroamphetamine XR (ADDERALL XR) 25 mg XR capsule    amphetamine-dextroamphetamine XR (ADDERALL XR) 25 mg XR capsule    amphetamine-dextroamphetamine XR (ADDERALL XR) 25 mg XR capsule    LORazepam (ATIVAN) 1 mg tablet     I have discussed the diagnosis with the patient and the intended plan as seen in the above orders. The patient has received an after-visit summary and questions were answered concerning future plans.  Patient conveyed understanding of the plan at the time of the visit.    Daniel Barreto, MSN, ANP  3/30/2018

## 2018-03-30 NOTE — MR AVS SNAPSHOT
[address] [address] 
[phone] Patient: Maribel Arana MRN: [id number] Bryce Murdock Visit Information Date & Time Provider Department Dept. Phone Encounter #  
 3/30/2018  2:30 PM Dorene Boggs NP [address] [facility] [phone] [id number] Upcoming Health Maintenance Date Due  
 HPV AGE 9Y-34Y (1 of 3 - Female 3 Dose Series) 8/4/2003 Pneumococcal 19-64 Medium Risk (1 of 1 - PPSV23) 8/4/2011 PAP AKA CERVICAL CYTOLOGY 8/4/2013 Influenza Age 5 to Adult 8/1/2017 DTaP/Tdap/Td series (3 - Td) 11/5/2025 Allergies as of 3/30/2018  Review Complete On: 3/30/2018 By: Dorene Boggs NP Severity Noted Reaction Type Reactions Sulfa (Sulfonamide Antibiotics)  08/12/2010    Hives, Other (comments)  
 gas Current Immunizations  Reviewed on 10/29/2013 Name Date Influenza Vaccine PF 3/28/2013  5:40 PM  
 Tdap 11/5/2015 10:21 PM, 3/28/2013  5:32 PM  
  
 Not reviewed this visit You Were Diagnosed With   
  
 Codes Comments Attention deficit hyperactivity disorder (ADHD), combined type     ICD-10-CM: F90.2 ICD-9-CM: 314.01 Anxiety     ICD-10-CM: F41.9 ICD-9-CM: 300.00 Vitals BP Pulse Temp Resp Height(growth percentile) Weight(growth percentile) 130/82 (BP 1 Location: Left arm, BP Patient Position: Sitting) 80 98.9 °F (37.2 °C) (Oral) 19 5' 7\" (1.702 m) 243 lb (110.2 kg) SpO2 BMI OB Status Smoking Status 98% 38.06 kg/m2 Having regular periods Former Smoker Vitals History BMI and BSA Data Body Mass Index Body Surface Area 38.06 kg/m 2 2.28 m 2 Preferred Pharmacy Pharmacy Name Phone [facility] [address] [phone] Your Updated Medication List  
  
   
This list is accurate as of 3/30/18  2:49 PM.  Always use your most recent med list.  
  
 albuterol 90 mcg/actuation inhaler Commonly known as:  PROVENTIL HFA, VENTOLIN HFA, PROAIR HFA Take 2 Puffs by inhalation every four (4) hours as needed for Wheezing. ALBUTEROL IN Take  by inhalation. * amphetamine-dextroamphetamine XR 25 mg XR capsule Commonly known as:  ADDERALL XR Take 1 Cap (25 mg total) by mouth every morning.  - must last 30 days * amphetamine-dextroamphetamine XR 25 mg XR capsule Commonly known as:  ADDERALL XR Take 1 Cap (25 mg total) by mouth every morning.  - must last 30 days * amphetamine-dextroamphetamine XR 25 mg XR capsule Commonly known as:  ADDERALL XR Take 1 Cap (25 mg total) by mouth every morning.  - must last 30 days  
  
 ibuprofen 600 mg tablet Commonly known as:  MOTRIN Take 1 Tab by mouth every eight (8) hours as needed for Pain. LORazepam 1 mg tablet Commonly known as:  ATIVAN Take 1 Tab by mouth every four (4) hours as needed for Anxiety. -must last 30 days  
  
 naloxone 4 mg/actuation nasal spray Commonly known as:  [employer] Use 1 spray intranasally into 1 nostril. Use a new Narcan nasal spray for each doses into alternating nostrils. May repeat every 2-3min prn  
  
 nystatin powder Commonly known as:  MYCOSTATIN Apply  to affected area four (4) times daily as needed. propranolol 10 mg tablet Commonly known as:  INDERAL Take 1 Tab by mouth three (3) times daily as needed. sertraline 100 mg tablet Commonly known as:  ZOLOFT  
TAKE ONE AND ONE-HALF TABLETS BY MOUTH EVERY DAY  
  
 * Notice: This list has 3 medication(s) that are the same as other medications prescribed for you. Read the directions carefully, and ask your doctor or other care provider to review them with you. Prescriptions Printed Refills  
 amphetamine-dextroamphetamine XR (ADDERALL XR) 25 mg XR capsule 0 Sig: Take 1 Cap (25 mg total) by mouth every morning.  - must last 30 days Class: Print Route: Oral  
 LORazepam (ATIVAN) 1 mg tablet 2 Sig: Take 1 Tab by mouth every four (4) hours as needed for Anxiety. -must last 30 days Class: Print Route: Oral  
  
Introducing [facility] & [facility] SERVICES! Dear Deetta Homans: 
Thank you for requesting a [employer] account. Our records indicate that you already have an active [employer] account. You can access your account anytime at [email or web address] Did you know that you can access your hospital and ER discharge instructions at any time in [employer]? You can also review all of your test results from your hospital stay or ER visit. Additional Information If you have questions, please visit the Frequently Asked Questions section of the [employer] website at [email or web address]. Remember, [employer] is NOT to be used for urgent needs. For medical emergencies, dial 911. Now available from your iPhone and Android! Please provide this summary of care documentation to your next provider. Your primary care clinician is listed as Mateo Severin. If you have any questions after today's visit, please call [phone].

## 2018-03-30 NOTE — PROGRESS NOTES
Chief Complaint   Patient presents with    Medication Refill     Pt seen in the office today for medication refill

## 2018-06-18 ENCOUNTER — OFFICE VISIT (OUTPATIENT)
Dept: FAMILY MEDICINE CLINIC | Age: 28
End: 2018-06-18

## 2018-06-18 VITALS
RESPIRATION RATE: 16 BRPM | HEIGHT: 67 IN | DIASTOLIC BLOOD PRESSURE: 92 MMHG | SYSTOLIC BLOOD PRESSURE: 130 MMHG | BODY MASS INDEX: 37.24 KG/M2 | OXYGEN SATURATION: 99 % | HEART RATE: 89 BPM | TEMPERATURE: 98.7 F | WEIGHT: 237.25 LBS

## 2018-06-18 DIAGNOSIS — F90.2 ATTENTION DEFICIT HYPERACTIVITY DISORDER (ADHD), COMBINED TYPE: ICD-10-CM

## 2018-06-18 RX ORDER — DEXTROAMPHETAMINE SACCHARATE, AMPHETAMINE ASPARTATE MONOHYDRATE, DEXTROAMPHETAMINE SULFATE AND AMPHETAMINE SULFATE 7.5; 7.5; 7.5; 7.5 MG/1; MG/1; MG/1; MG/1
30 CAPSULE, EXTENDED RELEASE ORAL
Qty: 30 CAP | Refills: 0 | Status: SHIPPED | OUTPATIENT
Start: 2018-06-18 | End: 2018-09-17 | Stop reason: SDUPTHER

## 2018-06-18 NOTE — MR AVS SNAPSHOT
[address] [address] 
[phone] Patient: Giovani Mondragon MRN: [id number] Cindy Lombardi Visit Information Date & Time Provider Department Dept. Phone Encounter #  
 6/18/2018  3:30 PM Fred Mcnulty, [address] [phone] [id number] Upcoming Health Maintenance Date Due  
 HPV Age 9Y-34Y (1 of 3 - Female 3 Dose Series) 8/4/2003 Pneumococcal 19-64 Medium Risk (1 of 1 - PPSV23) 8/4/2011 PAP AKA CERVICAL CYTOLOGY 8/4/2013 Influenza Age 5 to Adult 8/1/2018 DTaP/Tdap/Td series (3 - Td) 11/5/2025 Allergies as of 6/18/2018  Review Complete On: 6/18/2018 By: Huel Gosselin, LPN Severity Noted Reaction Type Reactions Sulfa (Sulfonamide Antibiotics)  08/12/2010    Hives, Other (comments)  
 gas Current Immunizations  Reviewed on 10/29/2013 Name Date Influenza Vaccine PF 3/28/2013  5:40 PM  
 Tdap 11/5/2015 10:21 PM, 3/28/2013  5:32 PM  
  
 Not reviewed this visit You Were Diagnosed With   
  
 Codes Comments Attention deficit hyperactivity disorder (ADHD), combined type     ICD-10-CM: F90.2 ICD-9-CM: 314.01 Vitals BP Pulse Temp Resp Height(growth percentile) Weight(growth percentile) (!) 130/92 89 98.7 °F (37.1 °C) (Oral) 16 5' 7\" (1.702 m) 237 lb 4 oz (107.6 kg) LMP SpO2 BMI OB Status Smoking Status 06/09/2018 (Approximate) 99% 37.16 kg/m2 Having regular periods Former Smoker Vitals History BMI and BSA Data Body Mass Index Body Surface Area  
 37.16 kg/m 2 2.26 m 2 Preferred Pharmacy Pharmacy Name Phone [facility] [address] [facility] [phone] Your Updated Medication List  
  
   
This list is accurate as of 6/18/18  3:55 PM.  Always use your most recent med list.  
  
  
  
  
 albuterol 90 mcg/actuation inhaler Commonly known as:  PROVENTIL HFA, VENTOLIN HFA, PROAIR HFA Take 2 Puffs by inhalation every four (4) hours as needed for Wheezing. ALBUTEROL IN Take  by inhalation. * amphetamine-dextroamphetamine XR 30 mg XR capsule Commonly known as:  ADDERALL XR Take 1 Cap (30 mg total) by mouth every morning.  - must last 30 days * amphetamine-dextroamphetamine XR 30 mg XR capsule Commonly known as:  ADDERALL XR Take 1 Cap (30 mg total) by mouth every morning.  - must last 30 days * amphetamine-dextroamphetamine XR 30 mg XR capsule Commonly known as:  ADDERALL XR Take 1 Cap (30 mg total) by mouth every morning.  - must last 30 days  
  
 ibuprofen 600 mg tablet Commonly known as:  MOTRIN Take 1 Tab by mouth every eight (8) hours as needed for Pain. LORazepam 1 mg tablet Commonly known as:  ATIVAN Take 1 Tab by mouth every four (4) hours as needed for Anxiety. -must last 30 days  
  
 naloxone 4 mg/actuation nasal spray Commonly known as:  [employer] Use 1 spray intranasally into 1 nostril. Use a new Narcan nasal spray for each doses into alternating nostrils. May repeat every 2-3min prn  
  
 nystatin powder Commonly known as:  MYCOSTATIN Apply  to affected area four (4) times daily as needed. propranolol 10 mg tablet Commonly known as:  INDERAL Take 1 Tab by mouth three (3) times daily as needed. sertraline 100 mg tablet Commonly known as:  ZOLOFT  
TAKE ONE AND ONE-HALF TABLETS BY MOUTH EVERY DAY  
  
 * Notice: This list has 3 medication(s) that are the same as other medications prescribed for you. Read the directions carefully, and ask your doctor or other care provider to review them with you. Prescriptions Printed Refills  
 amphetamine-dextroamphetamine XR (ADDERALL XR) 30 mg XR capsule 0 Sig: Take 1 Cap (30 mg total) by mouth every morning.  - must last 30 days Class: Print  Route: Oral  
  
Introducing [facility] SERVICES! Dear Juice Foy: 
Thank you for requesting a [employer] account. Our records indicate that you already have an active [employer] account. You can access your account anytime at [email or web address] Did you know that you can access your hospital and ER discharge instructions at any time in [employer]? You can also review all of your test results from your hospital stay or ER visit. Additional Information If you have questions, please visit the Frequently Asked Questions section of the [employer] website at [email or web address] Remember, [employer] is NOT to be used for urgent needs. For medical emergencies, dial 911. Now available from your iPhone and Android! Please provide this summary of care documentation to your next provider. Your primary care clinician is listed as Anival Del Angel. If you have any questions after today's visit, please call [phone].

## 2018-06-18 NOTE — PROGRESS NOTES
1. Have you been to the ER, urgent care clinic since your last visit? Hospitalized since your last visit? No    2. Have you seen or consulted any other health care providers outside of the [facility] since your last visit? Include any pap smears or colon screening.  No    Chief Complaint   Patient presents with    Follow-up     2 mo f/u    Medication Refill

## 2018-06-19 NOTE — PROGRESS NOTES
HISTORY OF PRESENT ILLNESS  Wade Whitlock is a 24 y.o. female. HPI  Patient returns to office for follow up ADD. Stable on medication without weight loss, decreased appetite, insomnia, or tremor. Good focus control but feels like the med wears off too soon. Gets three months of scripts at a time. She is managed on Adderall XR 25mg a day. ROS  A comprehensive review of system was obtained and negative except findings in the HPI    Visit Vitals    BP (!) 130/92    Pulse 89    Temp 98.7 °F (37.1 °C) (Oral)    Resp 16    Ht 5' 7\" (1.702 m)    Wt 237 lb 4 oz (107.6 kg)    LMP 06/09/2018 (Approximate)    SpO2 99%    BMI 37.16 kg/m2     Physical Exam   Constitutional: She is oriented to person, place, and time. She appears well-developed and well-nourished. Neck: No JVD present. Cardiovascular: Normal rate, regular rhythm and intact distal pulses. Exam reveals no gallop and no friction rub. No murmur heard. Pulmonary/Chest: Effort normal and breath sounds normal. No respiratory distress. She has no wheezes. Musculoskeletal: She exhibits no edema. Neurological: She is alert and oriented to person, place, and time. Skin: Skin is warm. Nursing note and vitals reviewed. ASSESSMENT and PLAN  Encounter Diagnoses   Name Primary?  Attention deficit hyperactivity disorder (ADHD), combined type      Orders Placed This Encounter    amphetamine-dextroamphetamine XR (ADDERALL XR) 30 mg XR capsule    amphetamine-dextroamphetamine XR (ADDERALL XR) 30 mg XR capsule    amphetamine-dextroamphetamine XR (ADDERALL XR) 30 mg XR capsule     Increase dose to Adderall XR 30mg a day  Recheck 3 mo  I have discussed the diagnosis with the patient and the intended plan as seen in the above orders. The patient has received an after-visit summary and questions were answered concerning future plans. Patient conveyed understanding of the plan at the time of the visit.     Deisy Turner, MSN, ANP 6/18/2018

## 2018-07-11 ENCOUNTER — TELEPHONE (OUTPATIENT)
Dept: FAMILY MEDICINE CLINIC | Age: 28
End: 2018-07-11

## 2018-07-11 DIAGNOSIS — F41.9 ANXIETY: ICD-10-CM

## 2018-07-11 RX ORDER — LORAZEPAM 1 MG/1
1 TABLET ORAL
Qty: 60 TAB | Refills: 2 | OUTPATIENT
Start: 2018-07-11 | End: 2018-11-19 | Stop reason: SDUPTHER

## 2018-07-11 NOTE — TELEPHONE ENCOUNTER
From: Irina Pearce  To: Matt Garcia NP  Sent: 7/11/2018 1:07 PM EDT  Subject: Medication Renewal Request    Original authorizing provider: ANTOINE Kay would like a refill of the following medications:  LORazepam (ATIVAN) 1 mg tablet Matt Garcia NP]    Preferred pharmacy: [address] [place] [address] AT [facility]:  When i last saw Suzi Rinne, I didn't get a refill on this medication. I am almost out. Also, please tell Suzi Rinne the 30mg XR has worked out well, I haven't had any strange side effects from it.  Thank you :)

## 2018-08-01 RX ORDER — METRONIDAZOLE 500 MG/1
500 TABLET ORAL 2 TIMES DAILY
Qty: 14 TAB | Refills: 0 | Status: SHIPPED | OUTPATIENT
Start: 2018-08-01 | End: 2018-08-08

## 2018-09-17 ENCOUNTER — OFFICE VISIT (OUTPATIENT)
Dept: FAMILY MEDICINE CLINIC | Age: 28
End: 2018-09-17

## 2018-09-17 VITALS
DIASTOLIC BLOOD PRESSURE: 82 MMHG | TEMPERATURE: 98.5 F | HEART RATE: 84 BPM | SYSTOLIC BLOOD PRESSURE: 120 MMHG | HEIGHT: 67 IN | WEIGHT: 237 LBS | BODY MASS INDEX: 37.2 KG/M2 | OXYGEN SATURATION: 100 % | RESPIRATION RATE: 20 BRPM

## 2018-09-17 DIAGNOSIS — F90.2 ATTENTION DEFICIT HYPERACTIVITY DISORDER (ADHD), COMBINED TYPE: Primary | ICD-10-CM

## 2018-09-17 DIAGNOSIS — G25.81 RLS (RESTLESS LEGS SYNDROME): ICD-10-CM

## 2018-09-17 RX ORDER — DEXTROAMPHETAMINE SACCHARATE, AMPHETAMINE ASPARTATE MONOHYDRATE, DEXTROAMPHETAMINE SULFATE AND AMPHETAMINE SULFATE 7.5; 7.5; 7.5; 7.5 MG/1; MG/1; MG/1; MG/1
30 CAPSULE, EXTENDED RELEASE ORAL
Qty: 30 CAP | Refills: 0 | Status: SHIPPED | OUTPATIENT
Start: 2018-09-17 | End: 2018-12-17 | Stop reason: SDUPTHER

## 2018-09-17 RX ORDER — PRAMIPEXOLE DIHYDROCHLORIDE 0.25 MG/1
0.25 TABLET ORAL
Qty: 30 TAB | Refills: 5 | Status: SHIPPED | OUTPATIENT
Start: 2018-09-17 | End: 2019-03-18 | Stop reason: SDUPTHER

## 2018-09-17 NOTE — PROGRESS NOTES
Chief Complaint   Patient presents with    Medication Refill     Sertraline, Adderall and Ativan     1. Have you been to the ER, urgent care clinic since your last visit? Hospitalized since your last visit? Yes, seen at [facility] ER for leg pains and numbness in bilateral feet. 2. Have you seen or consulted any other health care providers outside of the [facility] since your last visit? Include any pap smears or colon screening. No    Visit Vitals    /82 (BP 1 Location: Left arm, BP Patient Position: Sitting)    Pulse 84    Temp 98.5 °F (36.9 °C) (Oral)    Resp 20    Ht 5' 7\" (1.702 m)    Wt 237 lb (107.5 kg)    SpO2 100%    BMI 37.12 kg/m2           Patient currently taking minocycline for infected toe nail given by another provider.

## 2018-09-17 NOTE — MR AVS SNAPSHOT
[address] [address] 
[phone] Patient: Gianna Escobar MRN: [id number] Xavier Catherine Visit Information Date & Time Provider Department Dept. Phone Encounter #  
 9/17/2018  3:30 PM [address] [phone] [id number] Upcoming Health Maintenance Date Due  
 HPV Age 9Y-34Y (1 of 3 - Female 3 Dose Series) 8/4/2003 Pneumococcal 19-64 Medium Risk (1 of 1 - PPSV23) 8/4/2011 PAP AKA CERVICAL CYTOLOGY 8/4/2013 Influenza Age 5 to Adult 8/1/2018 DTaP/Tdap/Td series (3 - Td) 11/5/2025 Allergies as of 9/17/2018  Review Complete On: 9/17/2018 By: Mario Mcdowell Severity Noted Reaction Type Reactions Sulfa (Sulfonamide Antibiotics)  08/12/2010    Hives, Other (comments)  
 gas Current Immunizations  Reviewed on 10/29/2013 Name Date Influenza Vaccine PF 3/28/2013  5:40 PM  
 Tdap 11/5/2015 10:21 PM, 3/28/2013  5:32 PM  
  
 Not reviewed this visit You Were Diagnosed With   
  
 Codes Comments Attention deficit hyperactivity disorder (ADHD), combined type    -  Primary ICD-10-CM: F90.2 ICD-9-CM: 314.01   
 RLS (restless legs syndrome)     ICD-10-CM: G25.81 ICD-9-CM: 333.94 Vitals BP Pulse Temp Resp Height(growth percentile) Weight(growth percentile) 120/82 (BP 1 Location: Left arm, BP Patient Position: Sitting) 84 98.5 °F (36.9 °C) (Oral) 20 5' 7\" (1.702 m) 237 lb (107.5 kg) LMP SpO2 BMI OB Status Smoking Status 09/12/2018 (Approximate) 100% 37.12 kg/m2 Having regular periods Former Smoker Vitals History BMI and BSA Data Body Mass Index Body Surface Area  
 37.12 kg/m 2 2.25 m 2 Preferred Pharmacy Pharmacy Name Phone [facility] [address] [place] [address] [phone] Your Updated Medication List  
  
   
 This list is accurate as of 9/17/18  4:10 PM.  Always use your most recent med list.  
  
  
  
  
 albuterol 90 mcg/actuation inhaler Commonly known as:  PROVENTIL HFA, VENTOLIN HFA, PROAIR HFA Take 2 Puffs by inhalation every four (4) hours as needed for Wheezing. * amphetamine-dextroamphetamine XR 30 mg XR capsule Commonly known as:  ADDERALL XR Take 1 Cap (30 mg total) by mouth every morning.  - must last 30 days * amphetamine-dextroamphetamine XR 30 mg XR capsule Commonly known as:  ADDERALL XR Take 1 Cap (30 mg total) by mouth every morning.  - must last 30 days * amphetamine-dextroamphetamine XR 30 mg XR capsule Commonly known as:  ADDERALL XR Take 1 Cap (30 mg total) by mouth every morning.  - must last 30 days  
  
 ibuprofen 600 mg tablet Commonly known as:  MOTRIN Take 1 Tab by mouth every eight (8) hours as needed for Pain. LORazepam 1 mg tablet Commonly known as:  ATIVAN Take 1 Tab by mouth every four (4) hours as needed for Anxiety. -must last 30 days  
  
 nystatin powder Commonly known as:  MYCOSTATIN Apply  to affected area four (4) times daily as needed. pramipexole 0.25 mg tablet Commonly known as:  MIRAPEX Take 1 Tab by mouth nightly. - 7pm  
  
 sertraline 100 mg tablet Commonly known as:  ZOLOFT  
TAKE ONE AND ONE-HALF TABLETS BY MOUTH EVERY DAY  
  
 * Notice: This list has 3 medication(s) that are the same as other medications prescribed for you. Read the directions carefully, and ask your doctor or other care provider to review them with you. Prescriptions Printed Refills  
 amphetamine-dextroamphetamine XR (ADDERALL XR) 30 mg XR capsule 0 Sig: Take 1 Cap (30 mg total) by mouth every morning.  - must last 30 days Class: Print Route: Oral  
  
Prescriptions Sent to Pharmacy Refills  
 pramipexole (MIRAPEX) 0.25 mg tablet 5 Sig: Take 1 Tab by mouth nightly. - 7pm  
 Class: Normal  
 Pharmacy: [facility], [address] [place] [address] [address] [place] AT [address] #: [phone] Route: Oral  
  
Introducing [facility] SERVICES! Dear Hattie Duran: 
Thank you for requesting a "[employer] account. Our records indicate that you already have an active "[employer] account. You can access your account anytime at [email or web address] Did you know that you can access your hospital and ER discharge instructions at any time in "[employer]? You can also review all of your test results from your hospital stay or ER visit. Additional Information If you have questions, please visit the Frequently Asked Questions section of the "[employer] website at [email or web address] Remember, "[employer] is NOT to be used for urgent needs. For medical emergencies, dial 911. Now available from your iPhone and Android! Please provide this summary of care documentation to your next provider. Your primary care clinician is listed as Rock Krause. If you have any questions after today's visit, please call [phone].

## 2018-09-19 NOTE — PROGRESS NOTES
HISTORY OF PRESENT ILLNESS  Gera Monte is a 34 y.o. female. HPI  Patient returns to office for follow up ADD. Stable on medication without weight loss, decreased appetite, insomnia, or tremor. Good focus control. Gets three months of scripts at a time. See med order for dose. She is having sx of RLS with constant nervous legs, pain and tingling of the legs  She has been told by boyfriend that she kicks her legs all night  Runs in the family    ROS  A comprehensive review of system was obtained and negative except findings in the HPI    Visit Vitals    /82 (BP 1 Location: Left arm, BP Patient Position: Sitting)    Pulse 84    Temp 98.5 °F (36.9 °C) (Oral)    Resp 20    Ht 5' 7\" (1.702 m)    Wt 237 lb (107.5 kg)    LMP 09/12/2018 (Approximate)    SpO2 100%    BMI 37.12 kg/m2     Physical Exam   Constitutional: She is oriented to person, place, and time. She appears well-developed and well-nourished. Neck: No JVD present. Cardiovascular: Normal rate, regular rhythm and intact distal pulses. Exam reveals no gallop and no friction rub. No murmur heard. Pulmonary/Chest: Effort normal and breath sounds normal. No respiratory distress. She has no wheezes. Musculoskeletal: She exhibits no edema. Neurological: She is alert and oriented to person, place, and time. Skin: Skin is warm. Nursing note and vitals reviewed. ASSESSMENT and PLAN  Encounter Diagnoses   Name Primary?  Attention deficit hyperactivity disorder (ADHD), combined type Yes    RLS (restless legs syndrome)      Orders Placed This Encounter    amphetamine-dextroamphetamine XR (ADDERALL XR) 30 mg XR capsule    amphetamine-dextroamphetamine XR (ADDERALL XR) 30 mg XR capsule    amphetamine-dextroamphetamine XR (ADDERALL XR) 30 mg XR capsule    pramipexole (MIRAPEX) 0.25 mg tablet     rx refill for Adderall xr 30mg a day x 3 mo  Start Mirapex . 25mg qpm, , 1/2 pill first 4 days  Reviewed how to take and what to expect  I have discussed the diagnosis with the patient and the intended plan as seen in the above orders. The patient has received an after-visit summary and questions were answered concerning future plans. Patient conveyed understanding of the plan at the time of the visit.     Zachary Rivera MSN, ANP  9/18/2018

## 2018-10-12 RX ORDER — BUDESONIDE AND FORMOTEROL FUMARATE DIHYDRATE 160; 4.5 UG/1; UG/1
2 AEROSOL RESPIRATORY (INHALATION) 2 TIMES DAILY
Qty: 1 INHALER | Refills: 5 | Status: SHIPPED | OUTPATIENT
Start: 2018-10-12 | End: 2020-04-16

## 2018-11-19 DIAGNOSIS — F41.9 ANXIETY: ICD-10-CM

## 2018-11-20 RX ORDER — LORAZEPAM 1 MG/1
TABLET ORAL
Qty: 60 TAB | Refills: 0 | OUTPATIENT
Start: 2018-11-20 | End: 2018-12-17 | Stop reason: SDUPTHER

## 2018-12-17 ENCOUNTER — OFFICE VISIT (OUTPATIENT)
Dept: FAMILY MEDICINE CLINIC | Age: 28
End: 2018-12-17

## 2018-12-17 VITALS
RESPIRATION RATE: 16 BRPM | SYSTOLIC BLOOD PRESSURE: 129 MMHG | DIASTOLIC BLOOD PRESSURE: 88 MMHG | BODY MASS INDEX: 38.71 KG/M2 | HEIGHT: 67 IN | WEIGHT: 246.6 LBS | TEMPERATURE: 98.6 F | HEART RATE: 78 BPM | OXYGEN SATURATION: 98 %

## 2018-12-17 DIAGNOSIS — N92.1 MENORRHAGIA WITH IRREGULAR CYCLE: ICD-10-CM

## 2018-12-17 DIAGNOSIS — F41.9 ANXIETY: ICD-10-CM

## 2018-12-17 DIAGNOSIS — L65.9 HAIR LOSS: ICD-10-CM

## 2018-12-17 DIAGNOSIS — F41.8 ANXIETY ASSOCIATED WITH DEPRESSION: ICD-10-CM

## 2018-12-17 DIAGNOSIS — E28.2 PCOS (POLYCYSTIC OVARIAN SYNDROME): Primary | ICD-10-CM

## 2018-12-17 DIAGNOSIS — R63.5 WEIGHT GAIN: ICD-10-CM

## 2018-12-17 DIAGNOSIS — F90.2 ATTENTION DEFICIT HYPERACTIVITY DISORDER (ADHD), COMBINED TYPE: ICD-10-CM

## 2018-12-17 DIAGNOSIS — R73.01 IMPAIRED FASTING BLOOD SUGAR: ICD-10-CM

## 2018-12-17 RX ORDER — DEXTROAMPHETAMINE SACCHARATE, AMPHETAMINE ASPARTATE MONOHYDRATE, DEXTROAMPHETAMINE SULFATE AND AMPHETAMINE SULFATE 7.5; 7.5; 7.5; 7.5 MG/1; MG/1; MG/1; MG/1
30 CAPSULE, EXTENDED RELEASE ORAL
Qty: 30 CAP | Refills: 0 | Status: SHIPPED | OUTPATIENT
Start: 2018-12-17 | End: 2019-03-18 | Stop reason: ALTCHOICE

## 2018-12-17 RX ORDER — LORAZEPAM 1 MG/1
TABLET ORAL
Qty: 60 TAB | Refills: 2 | Status: SHIPPED | OUTPATIENT
Start: 2018-12-17 | End: 2019-03-18 | Stop reason: SDUPTHER

## 2018-12-17 NOTE — PROGRESS NOTES
Calixto Ball is a 34 y.o. female      Chief Complaint   Patient presents with    Follow-up     ADD    Medication Refill       1. Have you been to the ER, urgent care clinic since your last visit? Hospitalized since your last visit? No    2. Have you seen or consulted any other health care providers outside of the [address] [place] [address] since your last visit? Include any pap smears or colon screening.  No

## 2018-12-17 NOTE — PROGRESS NOTES
HISTORY OF PRESENT ILLNESS  Ching Bowers is a 34 y.o. female. HPI  Patient presents today with specialist request for labs  Hair loss, irregular cycles, heavy cycles, fatigue and weight gain  Already saw gyn who could not find anything wrong on exam    Patient returns to office for follow up ADD and anxiety. Stable on medication without weight loss, decreased appetite, insomnia, or tremor. Good focus control. Gets three months of scripts at a time. She is on adderall xr 30mg a day and ativan bid prn. ROS  A comprehensive review of system was obtained and negative except findings in the HPI    Visit Vitals  /88 (BP 1 Location: Left arm, BP Patient Position: Sitting)   Pulse 78   Temp 98.6 °F (37 °C) (Oral)   Resp 16   Ht 5' 7\" (1.702 m)   Wt 246 lb 9.6 oz (111.9 kg)   SpO2 98%   BMI 38.62 kg/m²     Physical Exam   Constitutional: She is oriented to person, place, and time. She appears well-developed and well-nourished. Neck: No JVD present. Cardiovascular: Normal rate, regular rhythm and intact distal pulses. Exam reveals no gallop and no friction rub. No murmur heard. Pulmonary/Chest: Effort normal and breath sounds normal. No respiratory distress. She has no wheezes. Musculoskeletal: She exhibits no edema. Neurological: She is alert and oriented to person, place, and time. Skin: Skin is warm. Nursing note and vitals reviewed. ASSESSMENT and PLAN  Encounter Diagnoses   Name Primary?     PCOS (polycystic ovarian syndrome) Yes    Attention deficit hyperactivity disorder (ADHD), combined type     Menorrhagia with irregular cycle     Weight gain     Hair loss     Impaired fasting blood sugar     Anxiety associated with depression     Anxiety      Orders Placed This Encounter    TSH 3RD GENERATION    DHEA SULFATE    TESTOSTERONE, FREE & TOTAL    FERRITIN    [facility]    METABOLIC PANEL, COMPREHENSIVE    LIPID PANEL    HEMOGLOBIN A1C WITH EAG    amphetamine-dextroamphetamine XR (ADDERALL XR) 30 mg XR capsule    amphetamine-dextroamphetamine XR (ADDERALL XR) 30 mg XR capsule    amphetamine-dextroamphetamine XR (ADDERALL XR) 30 mg XR capsule    LORazepam (ATIVAN) 1 mg tablet     All labs drawn  Patient will be able to access via [employer] portal  Adderall xr 30mg x 3 mo and ativan x 3 mo given  Follow up 3 mo    I have discussed the diagnosis with the patient and the intended plan as seen in the above orders. The patient has received an after-visit summary and questions were answered concerning future plans. Patient conveyed understanding of the plan at the time of the visit.     Alana Pastor, MSN, ANP  12/17/2018

## 2018-12-19 LAB
ALBUMIN SERPL-MCNC: 4.7 G/DL (ref 3.5–5.5)
ALBUMIN/GLOB SERPL: 1.7 {RATIO} (ref 1.2–2.2)
ALP SERPL-CCNC: 50 IU/L (ref 39–117)
ALT SERPL-CCNC: 22 IU/L (ref 0–32)
AST SERPL-CCNC: 12 IU/L (ref 0–40)
BILIRUB SERPL-MCNC: 0.3 MG/DL (ref 0–1.2)
BUN SERPL-MCNC: 11 MG/DL (ref 6–20)
BUN/CREAT SERPL: 19 (ref 9–23)
CALCIUM SERPL-MCNC: 9.6 MG/DL (ref 8.7–10.2)
CHLORIDE SERPL-SCNC: 103 MMOL/L (ref 96–106)
CHOLEST SERPL-MCNC: 193 MG/DL (ref 100–199)
CO2 SERPL-SCNC: 23 MMOL/L (ref 20–29)
CREAT SERPL-MCNC: 0.58 MG/DL (ref 0.57–1)
DHEA-S SERPL-MCNC: 166 UG/DL (ref 84.8–378)
EST. AVERAGE GLUCOSE BLD GHB EST-MCNC: 97 MG/DL
FERRITIN SERPL-MCNC: 29 NG/ML (ref 15–150)
FSH SERPL-ACNC: 6 MIU/ML
GLOBULIN SER CALC-MCNC: 2.8 G/DL (ref 1.5–4.5)
GLUCOSE SERPL-MCNC: 88 MG/DL (ref 65–99)
HBA1C MFR BLD: 5 % (ref 4.8–5.6)
HDLC SERPL-MCNC: 58 MG/DL
INTERPRETATION, 910389: NORMAL
LDLC SERPL CALC-MCNC: 115 MG/DL (ref 0–99)
LH SERPL-ACNC: 6.3 MIU/ML
POTASSIUM SERPL-SCNC: 3.9 MMOL/L (ref 3.5–5.2)
PROT SERPL-MCNC: 7.5 G/DL (ref 6–8.5)
SODIUM SERPL-SCNC: 140 MMOL/L (ref 134–144)
TESTOST FREE SERPL-MCNC: 2 PG/ML (ref 0–4.2)
TESTOST SERPL-MCNC: 16 NG/DL (ref 8–48)
TRIGL SERPL-MCNC: 100 MG/DL (ref 0–149)
TSH SERPL DL<=0.005 MIU/L-ACNC: 4.35 UIU/ML (ref 0.45–4.5)
VLDLC SERPL CALC-MCNC: 20 MG/DL (ref 5–40)

## 2018-12-21 NOTE — PROGRESS NOTES
Hey there, your labs overall look fantastic! Your thyroid is borderline slow so we need to check this again in 3 months. You are completely negative for diabetes.  Jared Lira

## 2019-01-22 RX ORDER — ALBUTEROL SULFATE 90 UG/1
AEROSOL, METERED RESPIRATORY (INHALATION)
Qty: 1 INHALER | Refills: 2 | Status: SHIPPED | OUTPATIENT
Start: 2019-01-22 | End: 2020-08-27

## 2019-02-18 ENCOUNTER — DOCUMENTATION ONLY (OUTPATIENT)
Dept: FAMILY MEDICINE CLINIC | Age: 29
End: 2019-02-18

## 2019-02-18 NOTE — PROGRESS NOTES
A doris came in to  a form for pt. Pt's HIPAA has that no one has access to Medical Records. WE cannot give anything to anyone. Ken Valadez handed phone over that had pt on the line she stated permission was given through [address] PSR told her no that per Law the person has to be on her HIPPA form. She kept asking to talk to the nurse. PSR told her she needed to call in to speak with her and that cell phone was not going to be given to the nurse. She asked if that nurse could call her, PSR kept repeating that she needs to call the main line. Pt hung up.
[facility] called and asked if the letter could be mailed, stated yes it could so it will be mailed
today

## 2019-03-18 ENCOUNTER — OFFICE VISIT (OUTPATIENT)
Dept: FAMILY MEDICINE CLINIC | Age: 29
End: 2019-03-18

## 2019-03-18 VITALS
HEART RATE: 83 BPM | HEIGHT: 67 IN | SYSTOLIC BLOOD PRESSURE: 150 MMHG | BODY MASS INDEX: 41.12 KG/M2 | OXYGEN SATURATION: 97 % | TEMPERATURE: 98.9 F | RESPIRATION RATE: 12 BRPM | DIASTOLIC BLOOD PRESSURE: 84 MMHG | WEIGHT: 262 LBS

## 2019-03-18 DIAGNOSIS — F41.9 ANXIETY: ICD-10-CM

## 2019-03-18 DIAGNOSIS — E03.9 ACQUIRED HYPOTHYROIDISM: ICD-10-CM

## 2019-03-18 DIAGNOSIS — F90.2 ATTENTION DEFICIT HYPERACTIVITY DISORDER (ADHD), COMBINED TYPE: Primary | ICD-10-CM

## 2019-03-18 DIAGNOSIS — F41.1 ANXIETY STATE: ICD-10-CM

## 2019-03-18 RX ORDER — LORAZEPAM 1 MG/1
TABLET ORAL
Qty: 60 TAB | Refills: 2 | Status: SHIPPED | OUTPATIENT
Start: 2019-03-18 | End: 2019-08-28 | Stop reason: SDUPTHER

## 2019-03-18 RX ORDER — PRAMIPEXOLE DIHYDROCHLORIDE 0.5 MG/1
0.5 TABLET ORAL
Qty: 30 TAB | Refills: 5 | Status: SHIPPED | OUTPATIENT
Start: 2019-03-18 | End: 2019-11-07 | Stop reason: SDUPTHER

## 2019-03-18 RX ORDER — SERTRALINE HYDROCHLORIDE 100 MG/1
TABLET, FILM COATED ORAL
Qty: 45 TAB | Refills: 5 | Status: SHIPPED | OUTPATIENT
Start: 2019-03-18 | End: 2019-06-24

## 2019-03-18 RX ORDER — DEXTROAMPHETAMINE SACCHARATE, AMPHETAMINE ASPARTATE, DEXTROAMPHETAMINE SULFATE AND AMPHETAMINE SULFATE 5; 5; 5; 5 MG/1; MG/1; MG/1; MG/1
20 TABLET ORAL 2 TIMES DAILY
Qty: 60 TAB | Refills: 0 | Status: SHIPPED | OUTPATIENT
Start: 2019-03-18 | End: 2019-04-10 | Stop reason: SDUPTHER

## 2019-03-18 NOTE — PROGRESS NOTES
Chief Complaint   Patient presents with    Medication Refill     Pt in office today for medication refill    Pt has question for provider (says she messaged through [employer] and provider knows)    Pt has no other concerns

## 2019-03-19 LAB
FT4I SERPL CALC-MCNC: 2 (ref 1.2–4.9)
T3RU NFR SERPL: 25 % (ref 24–39)
T4 SERPL-MCNC: 7.8 UG/DL (ref 4.5–12)
THYROPEROXIDASE AB SERPL-ACNC: 15 IU/ML (ref 0–34)
TSH SERPL DL<=0.005 MIU/L-ACNC: 2.5 UIU/ML (ref 0.45–4.5)

## 2019-03-19 NOTE — PROGRESS NOTES
HISTORY OF PRESENT ILLNESS  Saravanan Recinos is a 34 y.o. female. HPI  Patient here today for thyroid lab recheck  Borderline slow at last visit  Does have weight gain and fatigue    Does not feel the adderall xr is lasting through the day  Co-workers state that she is sliding with focus mid afternoon and drinking a lot of coffee    Needs get refill of her anxiety and RLS meds    ROS  A comprehensive review of system was obtained and negative except findings in the HPI    Visit Vitals  /84 (BP 1 Location: Right arm, BP Patient Position: Sitting)   Pulse 83   Temp 98.9 °F (37.2 °C) (Oral)   Resp 12   Ht 5' 7\" (1.702 m)   Wt 262 lb (118.8 kg)   SpO2 97%   BMI 41.04 kg/m²     Physical Exam   Constitutional: She is oriented to person, place, and time. She appears well-developed and well-nourished. Neck: No JVD present. Cardiovascular: Normal rate, regular rhythm and intact distal pulses. Exam reveals no gallop and no friction rub. No murmur heard. Pulmonary/Chest: Effort normal and breath sounds normal. No respiratory distress. She has no wheezes. Musculoskeletal: She exhibits no edema. Neurological: She is alert and oriented to person, place, and time. Skin: Skin is warm. Nursing note and vitals reviewed. ASSESSMENT and PLAN  Encounter Diagnoses   Name Primary?     Attention deficit hyperactivity disorder (ADHD), combined type Yes    Acquired hypothyroidism     Anxiety     Anxiety state      Orders Placed This Encounter    THYROID PANEL W/TSH    THYROID PEROXIDASE (TPO) AB    docosahexanoic acid/epa (FISH OIL PO)    BIOTIN PO    dextroamphetamine-amphetamine (ADDERALL) 20 mg tablet    LORazepam (ATIVAN) 1 mg tablet    pramipexole (MIRAPEX) 0.5 mg tablet    sertraline (ZOLOFT) 100 mg tablet     Refills updated today  Change to adderall 20mg bid,   Labs updated today as well  Dev plan with results    I have discussed the diagnosis with the patient and the intended plan as seen in the above orders. The patient has received an after-visit summary and questions were answered concerning future plans. Patient conveyed understanding of the plan at the time of the visit.     Perta Alegria MSN, ANP  3/19/2019

## 2019-03-26 ENCOUNTER — ED HISTORICAL/CONVERTED ENCOUNTER (OUTPATIENT)
Dept: OTHER | Age: 29
End: 2019-03-26

## 2019-04-10 DIAGNOSIS — F90.2 ATTENTION DEFICIT HYPERACTIVITY DISORDER (ADHD), COMBINED TYPE: ICD-10-CM

## 2019-04-10 RX ORDER — DEXTROAMPHETAMINE SACCHARATE, AMPHETAMINE ASPARTATE, DEXTROAMPHETAMINE SULFATE AND AMPHETAMINE SULFATE 5; 5; 5; 5 MG/1; MG/1; MG/1; MG/1
20 TABLET ORAL 2 TIMES DAILY
Qty: 60 TAB | Refills: 0 | Status: SHIPPED | OUTPATIENT
Start: 2019-04-10 | End: 2019-06-03 | Stop reason: SDUPTHER

## 2019-04-22 ENCOUNTER — TELEPHONE (OUTPATIENT)
Dept: FAMILY MEDICINE CLINIC | Age: 29
End: 2019-04-22

## 2019-04-22 NOTE — TELEPHONE ENCOUNTER
Regarding: Prescription Question  Contact: [phone]  ----- Message from [address] [place] [address] [address], Generic sent at 4/22/2019  9:29 AM EDT -----    Matilde Montanez, can you please make sure that my PA for Adderall 20mg BID is done today? I was unable to  my refill because insurance will only cover #30 qd, not #60 bid without a PA.      Thanks,  Be

## 2019-04-26 ENCOUNTER — OFFICE VISIT (OUTPATIENT)
Dept: FAMILY MEDICINE CLINIC | Age: 29
End: 2019-04-26

## 2019-04-26 DIAGNOSIS — Z51.81 MEDICATION MONITORING ENCOUNTER: Primary | ICD-10-CM

## 2019-04-26 LAB
BARBITURATES UR POC: NEGATIVE
BENZODIAZEPINES UR POC: NORMAL
COCAINE QL URINE POC: NEGATIVE
LOT EXP DATE POC: NORMAL
LOT NUMBER POC: NORMAL
MARIJUANA (THC) QL URINE POC: NEGATIVE
MDMA/ECSTASY UR POC: NEGATIVE
METHADONE QL URINE POC: NORMAL
METHAMPHETAMINE QL URINE POC: NORMAL
NTI OTHER MICRO TRANSPORT 977598: NORMAL
OPIATES QL URINE POC: NEGATIVE
OXYCODONE UR POC: NEGATIVE
VALID INTERNAL CONTROL?: YES

## 2019-05-06 ENCOUNTER — DOCUMENTATION ONLY (OUTPATIENT)
Dept: FAMILY MEDICINE CLINIC | Age: 29
End: 2019-05-06

## 2019-05-06 NOTE — PROGRESS NOTES
PA for Adderall 20 BID approved from 4/30/19 thru 4/30/20,copy faxed to pharmacy & Copy placed in scan folder to be scanned to chart.

## 2019-06-03 ENCOUNTER — OFFICE VISIT (OUTPATIENT)
Dept: FAMILY MEDICINE CLINIC | Age: 29
End: 2019-06-03

## 2019-06-03 VITALS
RESPIRATION RATE: 18 BRPM | BODY MASS INDEX: 41.44 KG/M2 | HEART RATE: 92 BPM | TEMPERATURE: 98.9 F | SYSTOLIC BLOOD PRESSURE: 150 MMHG | HEIGHT: 67 IN | DIASTOLIC BLOOD PRESSURE: 90 MMHG | WEIGHT: 264 LBS | OXYGEN SATURATION: 98 %

## 2019-06-03 DIAGNOSIS — F41.1 ANXIETY STATE: ICD-10-CM

## 2019-06-03 DIAGNOSIS — F90.2 ATTENTION DEFICIT HYPERACTIVITY DISORDER (ADHD), COMBINED TYPE: Primary | ICD-10-CM

## 2019-06-03 DIAGNOSIS — R06.83 SNORING: ICD-10-CM

## 2019-06-03 DIAGNOSIS — E66.01 OBESITY, MORBID (HCC): ICD-10-CM

## 2019-06-03 RX ORDER — DEXTROAMPHETAMINE SACCHARATE, AMPHETAMINE ASPARTATE, DEXTROAMPHETAMINE SULFATE AND AMPHETAMINE SULFATE 5; 5; 5; 5 MG/1; MG/1; MG/1; MG/1
20 TABLET ORAL 2 TIMES DAILY
Qty: 60 TAB | Refills: 0 | Status: SHIPPED | OUTPATIENT
Start: 2019-06-03 | End: 2019-08-28 | Stop reason: SDUPTHER

## 2019-06-03 RX ORDER — CHOLECALCIFEROL (VITAMIN D3) 125 MCG
CAPSULE ORAL
COMMUNITY
End: 2019-08-28 | Stop reason: ALTCHOICE

## 2019-06-03 NOTE — PROGRESS NOTES
Chief Complaint   Patient presents with    Medication Refill     Pt in office today for med refill  Pt wants to discuss weight    Pt has no other concerns

## 2019-06-03 NOTE — PROGRESS NOTES
HISTORY OF PRESENT ILLNESS  Brandy Ellington is a 34 y.o. female. HPI  Patient returns to office for follow up ADD. Stable on medication without weight loss, decreased appetite, insomnia, or tremor. Good focus control. Gets three months of scripts at a time. She is currently on adderall 20mg bid. She is tearful and frustrated that she has gained 18 pounds in the last 6 months  Denies any diet changes, trying to stay active  Wants to try to see nutritionist and start a diet pill  Even now snoring and waking up like she is not breathing at night    ROS  A comprehensive review of system was obtained and negative except findings in the HPI    Visit Vitals  /90 (BP 1 Location: Left arm, BP Patient Position: Sitting)   Pulse 92   Temp 98.9 °F (37.2 °C) (Oral)   Resp 18   Ht 5' 7\" (1.702 m)   Wt 264 lb (119.7 kg)   LMP 05/28/2019   SpO2 98%   BMI 41.35 kg/m²     Physical Exam   Constitutional: She is oriented to person, place, and time. She appears well-developed and well-nourished. Neck: No JVD present. Cardiovascular: Normal rate, regular rhythm and intact distal pulses. Exam reveals no gallop and no friction rub. No murmur heard. Pulmonary/Chest: Effort normal and breath sounds normal. No respiratory distress. She has no wheezes. Musculoskeletal: She exhibits no edema. Neurological: She is alert and oriented to person, place, and time. Skin: Skin is warm. Nursing note and vitals reviewed. ASSESSMENT and PLAN  Encounter Diagnoses   Name Primary?     Attention deficit hyperactivity disorder (ADHD), combined type Yes    Snoring     Anxiety state     Obesity, morbid ([address]      Orders Placed This Encounter    REFERRAL TO SLEEP STUDIES    cholecalciferol, vitamin D3, (VITAMIN D3) 2,000 unit tab    dextroamphetamine-amphetamine (ADDERALL) 20 mg tablet    dextroamphetamine-amphetamine (ADDERALL) 20 mg tablet    dextroamphetamine-amphetamine (ADDERALL) 20 mg tablet    naltrexone-buPROPion (CONTRAVE) 8-90 mg TbER ER tablet     Refills updated today  Given contrave to do a trial of diet pill  Reviewed adr/se of med and what to expect  Referral to sleep lab for eval    I have discussed the diagnosis with the patient and the intended plan as seen in the above orders. The patient has received an after-visit summary and questions were answered concerning future plans. Patient conveyed understanding of the plan at the time of the visit.     Essence Pinto, MSN, ANP  6/3/2019

## 2019-06-04 ENCOUNTER — TELEPHONE (OUTPATIENT)
Dept: FAMILY MEDICINE CLINIC | Age: 29
End: 2019-06-04

## 2019-06-04 NOTE — TELEPHONE ENCOUNTER
Regarding: Visit Follow-Up Question  Contact: [phone]  ----- Message from [address] [place] [address] [address], Generic sent at 6/4/2019  8:22 AM EDT -----    Good morning Marleni,     I spoke with my pharmacy yesterday and Victor Manuel is requiring a PA. I am hoping it gets approved. Could you please notify me once it's done?      Thanks so much,  [employer]

## 2019-06-24 ENCOUNTER — OFFICE VISIT (OUTPATIENT)
Dept: FAMILY MEDICINE CLINIC | Age: 29
End: 2019-06-24

## 2019-06-24 VITALS
OXYGEN SATURATION: 99 % | HEART RATE: 90 BPM | HEIGHT: 67 IN | WEIGHT: 262 LBS | RESPIRATION RATE: 14 BRPM | DIASTOLIC BLOOD PRESSURE: 97 MMHG | BODY MASS INDEX: 41.12 KG/M2 | TEMPERATURE: 98 F | SYSTOLIC BLOOD PRESSURE: 142 MMHG

## 2019-06-24 DIAGNOSIS — F41.1 ANXIETY STATE: Primary | ICD-10-CM

## 2019-06-24 DIAGNOSIS — H91.93 BILATERAL HEARING LOSS, UNSPECIFIED HEARING LOSS TYPE: ICD-10-CM

## 2019-06-24 DIAGNOSIS — E66.01 OBESITY, MORBID (HCC): ICD-10-CM

## 2019-06-24 RX ORDER — SERTRALINE HYDROCHLORIDE 100 MG/1
50 TABLET, FILM COATED ORAL DAILY
Qty: 45 TAB | Refills: 5 | COMMUNITY
Start: 2019-06-24 | End: 2019-08-28 | Stop reason: ALTCHOICE

## 2019-06-24 NOTE — PROGRESS NOTES
Chief Complaint   Patient presents with    Medication Evaluation     Pt in office for med eval  -pt states the med has been doing fine  -pt wants to discuss hearing difficulty   -going on for months  Pt states at home she has to turn the tv up but everyone else things it is loud    1. Have you been to the ER, urgent care clinic since your last visit? Hospitalized since your last visit? No    2. Have you seen or consulted any other health care providers outside of the [address] [place] [address] since your last visit? Include any pap smears or colon screening.  No    Pt has no other concerns

## 2019-06-25 NOTE — PROGRESS NOTES
HISTORY OF PRESENT ILLNESS  Paula Stein is a 34 y.o. female. HPI  Pt in office for med eval  -pt states the med has been doing fine  Started Contrave last visit, med is not covered by insurance  Can see that she is doing less stress eating  Just starting on week 3 of the taper, down 2 pounds    -pt wants to discuss hearing difficulty   -going on for months  Pt states at home she has to turn the tv up but everyone else things it is loud    ROS  A comprehensive review of system was obtained and negative except findings in the HPI    Visit Vitals  BP (!) 142/97 (BP 1 Location: Left arm, BP Patient Position: Sitting)   Pulse 90   Temp 98 °F (36.7 °C) (Oral)   Resp 14   Ht 5' 7\" (1.702 m)   Wt 262 lb (118.8 kg)   LMP 06/19/2019   SpO2 99%   BMI 41.04 kg/m²     Physical Exam   Constitutional: She is oriented to person, place, and time. She appears well-developed and well-nourished. Neck: No JVD present. Cardiovascular: Normal rate, regular rhythm and intact distal pulses. Exam reveals no gallop and no friction rub. No murmur heard. Pulmonary/Chest: Effort normal and breath sounds normal. No respiratory distress. She has no wheezes. Musculoskeletal: She exhibits no edema. Neurological: She is alert and oriented to person, place, and time. Skin: Skin is warm. Nursing note and vitals reviewed. ASSESSMENT and PLAN  Encounter Diagnoses   Name Primary?  Anxiety state Yes    Bilateral hearing loss, unspecified hearing loss type     Obesity, morbid ([address]      Orders Placed This Encounter   Agatha Gilliam ENT ref [facility]    naltrexone-buPROPion (CONTRAVE) 8-90 mg TbER ER tablet    sertraline (ZOLOFT) 100 mg tablet     Will cont to decrease zoloft and stay on contrave  Recheck 1 mo  Referral to Dr. Ama Pelayo for hearing eval    I have discussed the diagnosis with the patient and the intended plan as seen in the above orders.  The patient has received an after-visit summary and questions were answered concerning future plans. Patient conveyed understanding of the plan at the time of the visit.     Anna June, MSN, ANP  6/24/2019

## 2019-07-02 ENCOUNTER — OFFICE VISIT (OUTPATIENT)
Dept: SLEEP MEDICINE | Age: 29
End: 2019-07-02

## 2019-07-02 VITALS
BODY MASS INDEX: 41.11 KG/M2 | OXYGEN SATURATION: 98 % | HEIGHT: 67 IN | HEART RATE: 79 BPM | WEIGHT: 261.9 LBS | SYSTOLIC BLOOD PRESSURE: 140 MMHG | DIASTOLIC BLOOD PRESSURE: 93 MMHG

## 2019-07-02 DIAGNOSIS — G25.81 RESTLESS LEG SYNDROME: ICD-10-CM

## 2019-07-02 DIAGNOSIS — G47.8 SLEEP RELATED EATING DISORDER: ICD-10-CM

## 2019-07-02 DIAGNOSIS — G47.33 OSA (OBSTRUCTIVE SLEEP APNEA): Primary | ICD-10-CM

## 2019-07-02 DIAGNOSIS — G47.61 PLMD (PERIODIC LIMB MOVEMENT DISORDER): ICD-10-CM

## 2019-07-02 NOTE — PROGRESS NOTES
[address] Henri. E[place], [address]  Tel.  [phone]  Fax. [address] [facility] 60  [place], [address] [facility]  Tel.  [phone]  Fax. [phone] [address] [facility] Jadyn Vanegas   Tel.  [phone]  Fax. [phone]       Chief Complaint       Chief Complaint   Patient presents with    Sleep Problem     NP; ref Clark Yeh, ANTOINE; last seen 2015by Dr. Zach Maloney is 34 y.o. female seen for evaluation of a sleep disorder. She notes having had an initial sleep evaluation which she is states did not demonstrate significant sleep disordered breathing. She has gained over 60 pounds since that initial assessment. She notes she has been experiencing episodes where she would awaken because of choking. She states that she may \"sit up\" while still asleep. She also has potential sleep eating disorder. Currently she will retire at 10 PM and awaken at 6 AM.  She will awaken varying times during the night. She is tired on awakening and during the day. She has been told of snoring. He has a history of periodic leg movements for which she has been started on ropinirole. This continues to be a problem. Ferritin level in 2018 was low at 29 ng/ML. .    The patient has not undergone recentdiagnostic testing for the current problems. Horton Sleepiness Score: 13       Allergies   Allergen Reactions    Diclofenac Nausea Only    Sulfa (Sulfonamide Antibiotics) Hives and Other (comments)     gas       Current Outpatient Medications   Medication Sig Dispense Refill    naltrexone-buPROPion (CONTRAVE) 8-90 mg TbER ER tablet Take 2 Tabs by mouth Before breakfast and dinner. 120 Tab 1    sertraline (ZOLOFT) 100 mg tablet Take 0.5 Tabs by mouth daily. 45 Tab 5    cholecalciferol, vitamin D3, (VITAMIN D3) 2,000 unit tab Take  by mouth.       dextroamphetamine-amphetamine (ADDERALL) 20 mg tablet Take 1 Tab by mouth two (2) times a day. - must last 30 days 60 Tab 0    docosahexanoic acid/epa (FISH OIL PO) Take 2,000 Units by mouth.  LORazepam (ATIVAN) 1 mg tablet TAKE 1 TABLET BY MOUTH EVERY 4 HOURS AS NEEDED FOR ANXIETY MUST LAST 30 DAYS 60 Tab 2    pramipexole (MIRAPEX) 0.5 mg tablet Take 1 Tab by mouth nightly. - 7pm 30 Tab 5    VENTOLIN HFA 90 mcg/actuation inhaler INHALE 2 PUFFS BY MOUTH EVERY 4 HOURS AS NEEDED FOR WHEEZING 1 Inhaler 2    budesonide-formoterol (SYMBICORT) 160-4.5 mcg/actuation HFAA Take 2 Puffs by inhalation two (2) times a day. 1 Inhaler 5    ibuprofen (MOTRIN) 600 mg tablet Take 1 Tab by mouth every eight (8) hours as needed for Pain. 30 Tab 0    dextroamphetamine-amphetamine (ADDERALL) 20 mg tablet Take 1 Tab by mouth two (2) times a day. - must last 30 days 60 Tab 0    dextroamphetamine-amphetamine (ADDERALL) 20 mg tablet Take 1 Tab by mouth two (2) times a day. - must last 30 days 61 Tab 0        She  has a past medical history of Asthma, Essential hypertension, Ill-defined condition, Iron deficiency anemia, Other ill-defined conditions(799.89), Psychiatric disorder, and Seizure ([address] She also has no past medical history of Abnormal Pap smear, Chlamydia, Complication of anesthesia, Diabetes ([address] Genital herpes, unspecified, Gonorrhea, Heart abnormality, Herpes gestationis, Herpes simplex without mention of complication, Human immunodeficiency virus (HIV) disease ([address] Infertility, female, Kidney disease, Liver disease, Phlebitis and thrombophlebitis of unspecified site, Pituitary disorder ([address] Polycystic disease, ovaries, Postpartum depression, Rhesus isoimmunization unspecified as to episode of care in pregnancy, Sickle-cell disease, unspecified, Syphilis, Systemic lupus erythematosus ([address] Thyroid activity decreased, Trauma, Unspecified breast disorder, Unspecified diseases of blood and blood-forming organs, or Unspecified epilepsy without mention of intractable epilepsy.     She  has a past surgical history that includes pr  delivery only (); pr  delivery only (); hx gyn; hx tubal ligation; and hx dilation and curettage. She family history includes Hypertension in her father; Lung Disease in her maternal grandmother; No Known Problems in her mother. She  reports that she quit smoking about 4 years ago. Her smoking use included cigarettes. She has a 0.25 pack-year smoking history. She has never used smokeless tobacco. She reports that she drinks alcohol. She reports that she does not use drugs. Review of Systems:  Review of Systems   Constitutional: Negative for chills and fever. HENT: Negative for hearing loss and tinnitus. Eyes: Negative for blurred vision and double vision. Respiratory: Positive for shortness of breath. Negative for cough. Cardiovascular: Positive for chest pain and palpitations. Gastrointestinal: Negative for abdominal pain and heartburn. Genitourinary: Negative for frequency and urgency. Musculoskeletal: Positive for back pain. Negative for neck pain. Neurological: Positive for tingling and headaches. Psychiatric/Behavioral: Negative for depression. The patient is nervous/anxious. Objective:     Visit Vitals  BP (!) 140/93 (BP 1 Location: Left arm)   Pulse 79   Ht 5' 7\" (1.702 m)   Wt 261 lb 14.4 oz (118.8 kg)   LMP 2019   SpO2 98%   BMI 41.02 kg/m²     Body mass index is 41.02 kg/m². General:   Conversant, cooperative   Eyes:  Pupils equal and reactive, no nystagmus   Oropharynx:  Mallampati I, tongue scalloped   Tonsils:      Neck:   No carotid bruits; Neck circ. in \"inches\": 16.5   Chest/Lungs:  Clear on auscultation    CVS:  Normal rate, regular rhythm   Skin:  Warm to touch; no obvious rashes   Neuro:  Speech fluent, face symmetrical, tongue movement normal   Psych:  Normal affect,  normal countenance        Assessment:       ICD-10-CM ICD-9-CM    1.  JUDI (obstructive sleep apnea) G47.33 327.23 SLEEP STUDY UNATTENDED, 4 CHANNEL   2. Restless leg syndrome G25.81 333.94 FERRITIN   3. PLMD (periodic limb movement disorder) G47.61 327.51    4. Sleep related eating disorder G47.8 327.49      Potential sleep disordered breathing following recent significant weight gain. She will be evaluated with a nocturnal polysomnogram.    She has a history of restless leg/periodic leg movement disorder. Patient had previous ferritin that was low normal.  RLS may be associated with a ferritin levels of 70 ng/mL or less. The ferritin will be repeated. Supplemental iron would be indicated. Plan:     Orders Placed This Encounter    FERRITIN    SLEEP STUDY UNATTENDED, 4 CHANNEL     Order Specific Question:   Reason for Exam     Answer:   hx snoring       * Patient has a history and examination consistent with the diagnosis of sleep apnea, periodic leg movement disorder, restless leg syndrome. * Sleep testing was ordered for initial evaluation. *Ferritin level  * She was provided information on sleep apnea including corresponding risk factors and the importance of proper treatment. * Treatment options if indicated were reviewed today. Instructions:  o The patient would benefit from weight reduction measures. o Do not engage in activities requiring a normal degree of alertness if fatigue is present. o The patient understands that untreated or undertreated sleep apnea could impair judgement and the ability to function normally during the day.  o Call or return if symptoms worsen or persist.          Marlyce Cogan, MD, [facility]  Electronically signed 07/02/19       This note was created using voice recognition software. Despite editing, there may be syntax errors. This note will not be viewable in [address]

## 2019-07-02 NOTE — PATIENT INSTRUCTIONS
Restless Legs Syndrome: Care Instructions  Your Care Instructions  Restless legs syndrome is a common nervous system problem. People with this syndrome feel a creeping, achy, or unpleasant feeling in the legs and an overpowering urge to move them. It often occurs in the evening and at night and can lead to sleep problems and tiredness. Your doctor may suggest doing a study of your sleep patterns to figure out what is happening when you try to sleep. Many people get relief from symptoms when they get regular exercise, eat well, and avoid caffeine, alcohol, and tobacco.  Follow-up care is a key part of your treatment and safety. Be sure to make and go to all appointments, and call your doctor if you are having problems. It's also a good idea to know your test results and keep a list of the medicines you take. How can you care for yourself at home? · Take your medicines exactly as prescribed. Call your doctor if you think you are having a problem with your medicine. · Try bathing in hot or cold water. Applying a heating pad or ice bag to your legs may also help symptoms. · Stretch and massage your legs before bed or when discomfort begins. · Get some exercise for at least 30 minutes a day on most days of the week. Stop exercising at least 3 hours before bedtime. · Try to plan for situations where you will need to remain seated for long stretches. For example, if you are traveling by car, plan some stops so you can get out and walk around. · Tell your doctor about any medicines you are taking. This includes all over-the-counter, prescription, and herbal medicines. Some medicines, such as antidepressants, antihistamines, and cold and sinus medicines, can make your symptoms worse. · Avoid caffeine products, such as coffee, tea, cola, and chocolate. Caffeine can interrupt your sleep and stimulate you. · Do not smoke. Nicotine can make restless legs worse.  If you need help quitting, talk to your doctor about stop-smoking programs and medicines. These can increase your chances of quitting for good. · Do not drink alcohol late in the evening. Take steps to help you sleep better  · Get plenty of sunlight in the outdoors, particularly later in the afternoon. · Use the evening hours for settling down. Avoid activities that challenge you in the hours before bedtime. · Eat meals at regular times, and do not snack before bedtime. · Keep your bedroom quiet, dark, and cool. Try using a sleep mask and earplugs to help you sleep. · Limit how much you drink at night to reduce your need to get up to urinate. But do not go to bed thirsty. · Run a fan or other steady \"white noise\" during the night if noises wake you up. · Gaston the bed for sleeping and sex. Do your reading or TV watching in another room. · Once you are in bed, relax from head to toe, and guide your mind to pleasant thoughts. · Do not stay in bed longer than 8 hours, and try to avoid naps. When should you call for help? Watch closely for changes in your health, and be sure to contact your doctor if:    · You are still not getting enough sleep.     · Your symptoms become more severe or happen more often. Where can you learn more? Go to http://ezequiel-mitchell.info/. Enter Z077 in the search box to learn more about \"Restless Legs Syndrome: Care Instructions. \"  Current as of: Verónica 3, 2018  Content Version: 11.9  © 9787-0023 [employer]. Care instructions adapted under license by [employer] (which disclaims liability or warranty for this information). If you have questions about a medical condition or this instruction, always ask your healthcare professional. [place] [address] any warranty or liability for your use of this information.

## 2019-07-04 RX ORDER — CIPROFLOXACIN 500 MG/1
500 TABLET ORAL 2 TIMES DAILY
Qty: 10 TAB | Refills: 0 | Status: SHIPPED | OUTPATIENT
Start: 2019-07-04 | End: 2019-07-09

## 2019-07-22 ENCOUNTER — OFFICE VISIT (OUTPATIENT)
Dept: FAMILY MEDICINE CLINIC | Age: 29
End: 2019-07-22

## 2019-07-22 VITALS
BODY MASS INDEX: 40.56 KG/M2 | HEIGHT: 67 IN | WEIGHT: 258.4 LBS | TEMPERATURE: 98.5 F | SYSTOLIC BLOOD PRESSURE: 134 MMHG | HEART RATE: 105 BPM | OXYGEN SATURATION: 98 % | DIASTOLIC BLOOD PRESSURE: 86 MMHG

## 2019-07-22 DIAGNOSIS — E66.01 OBESITY, MORBID (HCC): Primary | ICD-10-CM

## 2019-07-22 NOTE — PROGRESS NOTES
Nancy Clayton is a 34 y.o. female , id x 2(name and ). Reviewed record, history, and  medications. Chief Complaint   Patient presents with    Medication Evaluation     contrave pt states she has been off of her med for about 1wk because of a possible uti.     -pt states she completely stopped the zoloft. Vitals:    19 1554   BP: 134/86   Pulse: (!) 105   Temp: 98.5 °F (36.9 °C)   SpO2: 98%   Weight: 258 lb 6.4 oz (117.2 kg)   Height: 5' 7\" (1.702 m)   PainSc:   0 - No pain   LMP: 2019     Coordination of Care Questionnaire:   1) Have you been to an emergency room, urgent care, or hospitalized since your last visit? Yes  july    2. Have seen or consulted any other health care provider since your last visit? No     3) Do you have an Advanced Directive/ Living Will in place? No  If yes, do we have a copy on file NO  If no, would you like information NO    Patient is accompanied by self I have received verbal consent from Nancy Clayton to discuss any/all medical information while they are present in the room.

## 2019-07-24 ENCOUNTER — DOCUMENTATION ONLY (OUTPATIENT)
Dept: FAMILY MEDICINE CLINIC | Age: 29
End: 2019-07-24

## 2019-07-24 NOTE — PROGRESS NOTES
Contrave submitted to [employer] via Cover My Meds. Awaiting reponse.    Key: [id number]  PA Case ID: [id number]

## 2019-07-28 NOTE — PROGRESS NOTES
HISTORY OF PRESENT ILLNESS  Yovani Dhillon is a 34 y.o. female. HPI  She is here for follow up weight check  Off med x 1 week completely due to uti  Recent dx of HSV as well and on meds for this by gyn  Would like to restart the contrave, did help with appetite    ROS  A comprehensive review of system was obtained and negative except findings in the HPI    Visit Vitals  /86   Pulse (!) 105   Temp 98.5 °F (36.9 °C)   Ht 5' 7\" (1.702 m)   Wt 258 lb 6.4 oz (117.2 kg)   LMP 07/13/2019 (Exact Date)   SpO2 98%   BMI 40.47 kg/m²     Physical Exam   Constitutional: She is oriented to person, place, and time. She appears well-developed and well-nourished. Neck: No JVD present. Cardiovascular: Normal rate, regular rhythm and intact distal pulses. Exam reveals no gallop and no friction rub. No murmur heard. Pulmonary/Chest: Effort normal and breath sounds normal. No respiratory distress. She has no wheezes. Musculoskeletal: She exhibits no edema. Neurological: She is alert and oriented to person, place, and time. Skin: Skin is warm. Nursing note and vitals reviewed. ASSESSMENT and PLAN  Encounter Diagnoses   Name Primary?  Obesity, morbid ([facility] [address] Yes     Orders Placed This Encounter    naltrexone-buPROPion (CONTRAVE) 8-90 mg TbER ER tablet     Given contrave and reviewed how to restart med  Recheck 1 mo for weight check  Reviewed s/s HSV as well    I have discussed the diagnosis with the patient and the intended plan as seen in the above orders. The patient has received an after-visit summary and questions were answered concerning future plans. Patient conveyed understanding of the plan at the time of the visit.     Jose R Escobar, MSN, ANP  7/28/2019

## 2019-08-12 ENCOUNTER — TELEPHONE (OUTPATIENT)
Dept: SLEEP MEDICINE | Age: 29
End: 2019-08-12

## 2019-08-12 NOTE — TELEPHONE ENCOUNTER
Records show that patient still has HSAT Serial #[id number] out to her. Left voicemail reminder to drop off device at our [place] location as soon as possible.

## 2019-08-15 ENCOUNTER — TELEPHONE (OUTPATIENT)
Dept: SLEEP MEDICINE | Age: 29
End: 2019-08-15

## 2019-08-15 DIAGNOSIS — G47.33 OSA (OBSTRUCTIVE SLEEP APNEA): Primary | ICD-10-CM

## 2019-08-15 LAB — FERRITIN SERPL-MCNC: 52 NG/ML (ref 15–150)

## 2019-08-23 NOTE — TELEPHONE ENCOUNTER
HSAT listed as failed due to poor O2 signal and flow signal. Limited recording time. Order has been entered for inlab study. Chief technologist: please advise patient of HSAT results. Order has been entered for an lab study.

## 2019-08-28 ENCOUNTER — OFFICE VISIT (OUTPATIENT)
Dept: FAMILY MEDICINE CLINIC | Age: 29
End: 2019-08-28

## 2019-08-28 VITALS
DIASTOLIC BLOOD PRESSURE: 89 MMHG | RESPIRATION RATE: 14 BRPM | TEMPERATURE: 98.5 F | HEIGHT: 67 IN | BODY MASS INDEX: 40.34 KG/M2 | OXYGEN SATURATION: 98 % | SYSTOLIC BLOOD PRESSURE: 138 MMHG | HEART RATE: 100 BPM | WEIGHT: 257 LBS

## 2019-08-28 DIAGNOSIS — E28.2 PCOS (POLYCYSTIC OVARIAN SYNDROME): Primary | ICD-10-CM

## 2019-08-28 DIAGNOSIS — F90.2 ATTENTION DEFICIT HYPERACTIVITY DISORDER (ADHD), COMBINED TYPE: ICD-10-CM

## 2019-08-28 DIAGNOSIS — K62.5 RECTAL BLEEDING: ICD-10-CM

## 2019-08-28 DIAGNOSIS — F41.9 ANXIETY: ICD-10-CM

## 2019-08-28 RX ORDER — DEXTROAMPHETAMINE SACCHARATE, AMPHETAMINE ASPARTATE, DEXTROAMPHETAMINE SULFATE AND AMPHETAMINE SULFATE 5; 5; 5; 5 MG/1; MG/1; MG/1; MG/1
20 TABLET ORAL 2 TIMES DAILY
Qty: 60 TAB | Refills: 0 | Status: SHIPPED | OUTPATIENT
Start: 2019-08-28 | End: 2019-11-22 | Stop reason: SDUPTHER

## 2019-08-28 RX ORDER — LORAZEPAM 1 MG/1
TABLET ORAL
Qty: 60 TAB | Refills: 2 | Status: SHIPPED | OUTPATIENT
Start: 2019-08-28 | End: 2020-08-27

## 2019-08-28 NOTE — PROGRESS NOTES
Chief Complaint   Patient presents with    Medication Refill     Pt in office today for med refill  1. Have you been to the ER, urgent care clinic since your last visit? Hospitalized since your last visit? No    2. Have you seen or consulted any other health care providers outside of the [address] [place] [address] since your last visit? Include any pap smears or colon screening.  No     Pt has no other concerns

## 2019-08-28 NOTE — PROGRESS NOTES
HISTORY OF PRESENT ILLNESS  Darrius Hatfield is a 34 y.o. female. HPI  Patient returns to office for follow up ADD. Stable on medication without weight loss, decreased appetite, insomnia, or tremor. Good focus control. Gets three months of scripts at a time. She is managed on Adderall 20mg bid. She also needs refills of her ativan that she takes prn  Did not loose any weight on contrave so stopped med  Wants to see endo for weight gain  Also wants new referral to gen surg for bleeding hemorrhoids. ROS  A comprehensive review of system was obtained and negative except findings in the HPI    Visit Vitals  /89 (BP 1 Location: Left arm, BP Patient Position: Sitting)   Pulse 100   Temp 98.5 °F (36.9 °C) (Oral)   Resp 14   Ht 5' 7\" (1.702 m)   Wt 257 lb (116.6 kg)   LMP 08/11/2019   SpO2 98%   BMI 40.25 kg/m²     Physical Exam   Constitutional: She is oriented to person, place, and time. She appears well-developed and well-nourished. Neck: No JVD present. Cardiovascular: Normal rate, regular rhythm and intact distal pulses. Exam reveals no gallop and no friction rub. No murmur heard. Pulmonary/Chest: Effort normal and breath sounds normal. No respiratory distress. She has no wheezes. Musculoskeletal: She exhibits no edema. Neurological: She is alert and oriented to person, place, and time. Skin: Skin is warm. Nursing note and vitals reviewed. ASSESSMENT and PLAN  Encounter Diagnoses   Name Primary?     PCOS (polycystic ovarian syndrome) Yes    Attention deficit hyperactivity disorder (ADHD), combined type     Anxiety     Rectal bleeding      Orders Placed This Encounter    REFERRAL TO ENDOCRINOLOGY    REFERRAL TO GENERAL SURGERY    dextroamphetamine-amphetamine (ADDERALL) 20 mg tablet    LORazepam (ATIVAN) 1 mg tablet    dextroamphetamine-amphetamine (ADDERALL) 20 mg tablet    dextroamphetamine-amphetamine (ADDERALL) 20 mg tablet     Refills updated   Referrals given for endo and gen surg  Follow up 3 mo  I have discussed the diagnosis with the patient and the intended plan as seen in the above orders. The patient has received an after-visit summary and questions were answered concerning future plans. Patient conveyed understanding of the plan at the time of the visit.     Barrington Woo, MSN, ANP  8/28/2019

## 2019-09-10 ENCOUNTER — DOCUMENTATION ONLY (OUTPATIENT)
Dept: FAMILY MEDICINE CLINIC | Age: 29
End: 2019-09-10

## 2019-09-10 NOTE — PROGRESS NOTES
Faxed 08/28/19 office note & 03/18/19 lab results to Wolfgang Mcclure ([phone]) @Dr Kael Le office. Fax #[phone] confirmation received.

## 2019-10-31 RX ORDER — CIPROFLOXACIN 500 MG/1
500 TABLET ORAL 2 TIMES DAILY
Qty: 10 TAB | Refills: 0 | Status: SHIPPED | OUTPATIENT
Start: 2019-10-31 | End: 2019-11-05

## 2019-11-08 RX ORDER — PRAMIPEXOLE DIHYDROCHLORIDE 0.5 MG/1
TABLET ORAL
Qty: 30 TAB | Refills: 0 | Status: SHIPPED | OUTPATIENT
Start: 2019-11-08 | End: 2019-12-15 | Stop reason: SDUPTHER

## 2019-11-22 ENCOUNTER — OFFICE VISIT (OUTPATIENT)
Dept: FAMILY MEDICINE CLINIC | Age: 29
End: 2019-11-22

## 2019-11-22 VITALS
SYSTOLIC BLOOD PRESSURE: 118 MMHG | HEIGHT: 67 IN | HEART RATE: 98 BPM | TEMPERATURE: 98.7 F | WEIGHT: 252 LBS | OXYGEN SATURATION: 98 % | BODY MASS INDEX: 39.55 KG/M2 | RESPIRATION RATE: 18 BRPM | DIASTOLIC BLOOD PRESSURE: 73 MMHG

## 2019-11-22 DIAGNOSIS — J32.0 MAXILLARY SINUSITIS, UNSPECIFIED CHRONICITY: Primary | ICD-10-CM

## 2019-11-22 DIAGNOSIS — F90.2 ATTENTION DEFICIT HYPERACTIVITY DISORDER (ADHD), COMBINED TYPE: ICD-10-CM

## 2019-11-22 RX ORDER — HYDROCORTISONE 25 MG/G
OINTMENT TOPICAL 2 TIMES DAILY
Qty: 30 G | Refills: 0 | Status: SHIPPED | OUTPATIENT
Start: 2019-11-22 | End: 2020-08-27

## 2019-11-22 RX ORDER — DEXTROAMPHETAMINE SACCHARATE, AMPHETAMINE ASPARTATE, DEXTROAMPHETAMINE SULFATE AND AMPHETAMINE SULFATE 5; 5; 5; 5 MG/1; MG/1; MG/1; MG/1
20 TABLET ORAL 2 TIMES DAILY
Qty: 60 TAB | Refills: 0 | Status: SHIPPED | OUTPATIENT
Start: 2019-11-30 | End: 2020-07-01 | Stop reason: SDUPTHER

## 2019-11-22 RX ORDER — DEXTROAMPHETAMINE SACCHARATE, AMPHETAMINE ASPARTATE, DEXTROAMPHETAMINE SULFATE AND AMPHETAMINE SULFATE 5; 5; 5; 5 MG/1; MG/1; MG/1; MG/1
20 TABLET ORAL 2 TIMES DAILY
Qty: 60 TAB | Refills: 0 | Status: SHIPPED | OUTPATIENT
Start: 2019-12-30 | End: 2020-02-26 | Stop reason: SDUPTHER

## 2019-11-22 RX ORDER — DEXTROAMPHETAMINE SACCHARATE, AMPHETAMINE ASPARTATE, DEXTROAMPHETAMINE SULFATE AND AMPHETAMINE SULFATE 5; 5; 5; 5 MG/1; MG/1; MG/1; MG/1
20 TABLET ORAL 2 TIMES DAILY
Qty: 60 TAB | Refills: 0 | Status: SHIPPED | OUTPATIENT
Start: 2020-01-30 | End: 2020-02-26 | Stop reason: SDUPTHER

## 2019-11-22 RX ORDER — CEFDINIR 300 MG/1
300 CAPSULE ORAL 2 TIMES DAILY
Qty: 20 CAP | Refills: 0 | Status: SHIPPED | OUTPATIENT
Start: 2019-11-22 | End: 2019-12-02

## 2019-11-22 NOTE — PROGRESS NOTES
Chief Complaint   Patient presents with    Medication Refill     Pt in office today for med refill    1. Have you been to the ER, urgent care clinic since your last visit? Hospitalized since your last visit? No    2. Have you seen or consulted any other health care providers outside of the [address] [place] [address] since your last visit? Include any pap smears or colon screening.  No     Pt has no other concerns

## 2019-11-25 NOTE — PROGRESS NOTES
HPI/ROS  Patient complains of bilateral ear pressure/pain. Symptoms include congestion, headache described as frontal, lightheadedness, low grade fever, post nasal drip, productive cough with  yellow colored sputum, sinus pressure, tooth pain and vertigo. Onset of symptoms was 5 days ago, gradually worsening since that time. Patient is drinking plenty of fluids. .  Past history is significant for no history of pneumonia or bronchitis. Patient is non-smoker. She is not taking any meds for congestion. Patient returns to office for follow up ADD. Stable on medication without weight loss, decreased appetite, insomnia, or tremor. Good focus control. Gets three months of scripts at a time. See med order for dose. Visit Vitals  /73 (BP 1 Location: Left arm, BP Patient Position: Sitting)   Pulse 98   Temp 98.7 °F (37.1 °C) (Oral)   Resp 18   Ht 5' 7\" (1.702 m)   Wt 252 lb (114.3 kg)   LMP 11/08/2019   SpO2 98%   BMI 39.47 kg/m²     Physical Examination:   GENERAL ASSESSMENT: well developed and well nourished  SKIN: normal color, no lesions  HEAD: normocephalic  EYES: normal eyes  EARS: external auditory canal: clear and tympanic membrane: yellow, bulging  NOSE: normal external appearance and nares patent  MOUTH: yellow exudates of OP  NECK: normal  CHEST: normal air exchange, no rales, no rhonchi, no wheezes, respiratory effort normal with no retractions  HEART: regular rate and rhythm, normal S1/S2, no murmurs  ABDOMEN:  not examined  EXTREMITY: not examined  NEURO: not examined    Diagnoses and all orders for this visit:    1. Maxillary sinusitis, unspecified chronicity    2. Attention deficit hyperactivity disorder (ADHD), combined type  -     dextroamphetamine-amphetamine (ADDERALL) 20 mg tablet; Take 1 Tab by mouth two (2) times a day. - must last 30 days  -     dextroamphetamine-amphetamine (ADDERALL) 20 mg tablet; Take 1 Tab by mouth two (2) times a day.  - must last 30 days  - dextroamphetamine-amphetamine (ADDERALL) 20 mg tablet; Take 1 Tab by mouth two (2) times a day. - must last 30 days    Other orders  -     cefdinir (OMNICEF) 300 mg capsule; Take 1 Cap by mouth two (2) times a day for 10 days. -     hydrocortisone (HYTONE) 2.5 % ointment; Apply  to affected area two (2) times a day. use thin layer      Reveiwed adr/se of medication  Push fluids, rest, suggested mucinex for congestion and drainage  Recheck 5-7 days if sx not improved. Refilled adderall x 3 mo  Reviewed adr/se of meds    I have discussed the diagnosis with the patient and the intended plan as seen in the above orders. The patient has received an after-visit summary and questions were answered concerning future plans. Patient conveyed understanding of the plan at the time of the visit.     Alea Pierson, MSN, ANP  11/24/2019

## 2019-12-15 RX ORDER — PRAMIPEXOLE DIHYDROCHLORIDE 0.5 MG/1
TABLET ORAL
Qty: 30 TAB | Refills: 0 | Status: SHIPPED | OUTPATIENT
Start: 2019-12-15 | End: 2020-01-26

## 2020-01-26 RX ORDER — PRAMIPEXOLE DIHYDROCHLORIDE 0.5 MG/1
TABLET ORAL
Qty: 30 TAB | Refills: 0 | Status: SHIPPED | OUTPATIENT
Start: 2020-01-26 | End: 2020-04-16

## 2020-01-30 RX ORDER — BACLOFEN 10 MG/1
10 TABLET ORAL 3 TIMES DAILY
Qty: 30 TAB | Refills: 0 | Status: SHIPPED | OUTPATIENT
Start: 2020-01-30 | End: 2020-02-26 | Stop reason: SDUPTHER

## 2020-02-26 ENCOUNTER — OFFICE VISIT (OUTPATIENT)
Dept: FAMILY MEDICINE CLINIC | Age: 30
End: 2020-02-26

## 2020-02-26 ENCOUNTER — HOSPITAL ENCOUNTER (OUTPATIENT)
Dept: LAB | Age: 30
Discharge: HOME | End: 2020-02-26

## 2020-02-26 VITALS
RESPIRATION RATE: 14 BRPM | HEART RATE: 73 BPM | BODY MASS INDEX: 36.88 KG/M2 | HEIGHT: 67 IN | OXYGEN SATURATION: 98 % | TEMPERATURE: 98 F | WEIGHT: 235 LBS | SYSTOLIC BLOOD PRESSURE: 126 MMHG | DIASTOLIC BLOOD PRESSURE: 84 MMHG

## 2020-02-26 DIAGNOSIS — Z00.00 WELL ADULT EXAM: ICD-10-CM

## 2020-02-26 DIAGNOSIS — Z00.00 WELL ADULT EXAM: Primary | ICD-10-CM

## 2020-02-26 DIAGNOSIS — F90.2 ATTENTION DEFICIT HYPERACTIVITY DISORDER (ADHD), COMBINED TYPE: ICD-10-CM

## 2020-02-26 RX ORDER — DEXTROAMPHETAMINE SACCHARATE, AMPHETAMINE ASPARTATE, DEXTROAMPHETAMINE SULFATE AND AMPHETAMINE SULFATE 5; 5; 5; 5 MG/1; MG/1; MG/1; MG/1
20 TABLET ORAL 2 TIMES DAILY
Qty: 60 TAB | Refills: 0 | Status: SHIPPED | OUTPATIENT
Start: 2020-04-06 | End: 2020-07-01 | Stop reason: SDUPTHER

## 2020-02-26 RX ORDER — BACLOFEN 10 MG/1
10 TABLET ORAL 3 TIMES DAILY
Qty: 30 TAB | Refills: 0 | Status: SHIPPED | OUTPATIENT
Start: 2020-04-06 | End: 2020-08-27

## 2020-02-26 RX ORDER — DEXTROAMPHETAMINE SACCHARATE, AMPHETAMINE ASPARTATE, DEXTROAMPHETAMINE SULFATE AND AMPHETAMINE SULFATE 5; 5; 5; 5 MG/1; MG/1; MG/1; MG/1
20 TABLET ORAL 2 TIMES DAILY
Qty: 60 TAB | Refills: 0 | Status: SHIPPED | OUTPATIENT
Start: 2020-03-06 | End: 2020-02-26 | Stop reason: SDUPTHER

## 2020-02-26 RX ORDER — DEXTROAMPHETAMINE SACCHARATE, AMPHETAMINE ASPARTATE, DEXTROAMPHETAMINE SULFATE AND AMPHETAMINE SULFATE 5; 5; 5; 5 MG/1; MG/1; MG/1; MG/1
20 TABLET ORAL 2 TIMES DAILY
Qty: 60 TAB | Refills: 0 | Status: SHIPPED | OUTPATIENT
Start: 2020-05-06 | End: 2020-07-01 | Stop reason: SDUPTHER

## 2020-02-26 NOTE — PROGRESS NOTES
HISTORY OF PRESENT ILLNESS  Amarilis Blakely is a 34 y.o. female. HPI  Patient returns to office for follow up ADD. Stable on medication without weight loss, decreased appetite, insomnia, or tremor. Good focus control. Gets three months of scripts at a time. She is managed on Adderall BID. She is also due for labs for the year for her well exam  No complaints  Down 17 pounds since the fall with diet  Less stress eating after bad relationship that finally ended    ROS  A comprehensive review of system was obtained and negative except findings in the HPI    Visit Vitals  /84 (BP 1 Location: Left arm, BP Patient Position: Sitting)   Pulse 73   Temp 98 °F (36.7 °C) (Oral)   Resp 14   Ht 5' 7\" (1.702 m)   Wt 235 lb (106.6 kg)   LMP 02/12/2020   SpO2 98%   BMI 36.81 kg/m²     Physical Exam  Vitals signs and nursing note reviewed. Constitutional:       Appearance: She is well-developed. Comments:      Neck:      Vascular: No JVD. Cardiovascular:      Rate and Rhythm: Normal rate and regular rhythm. Heart sounds: No murmur. No friction rub. No gallop. Pulmonary:      Effort: Pulmonary effort is normal. No respiratory distress. Breath sounds: Normal breath sounds. No wheezing. Skin:     General: Skin is warm. Neurological:      Mental Status: She is alert and oriented to person, place, and time. ASSESSMENT and PLAN  Encounter Diagnoses   Name Primary?     Well adult exam Yes    Attention deficit hyperactivity disorder (ADHD), combined type      Orders Placed This Encounter    LIPID PANEL    CBC WITH AUTOMATED DIFF    METABOLIC PANEL, COMPREHENSIVE    TSH 3RD GENERATION    URINALYSIS W/ RFLX MICROSCOPIC    dextroamphetamine-amphetamine (ADDERALL) 20 mg tablet    baclofen (LIORESAL) 10 mg tablet    DISCONTD: dextroamphetamine-amphetamine (ADDERALL) 20 mg tablet    dextroamphetamine-amphetamine (ADDERALL) 20 mg tablet     Labs and refills updated today  Follow up 1 year if stable  I have discussed the diagnosis with the patient and the intended plan as seen in the above orders. The patient has received an after-visit summary and questions were answered concerning future plans. Patient conveyed understanding of the plan at the time of the visit.     Tressa Umaña, MSN, ANP  2/26/2020

## 2020-02-26 NOTE — PROGRESS NOTES
Chief Complaint   Patient presents with    Follow-up     Pt in office today for fuv  -med refill  Pt has concerns about vaccines for going over seas   1. Have you been to the ER, urgent care clinic since your last visit? Hospitalized since your last visit? No    2. Have you seen or consulted any other health care providers outside of the [address] [place] [address] since your last visit? Include any pap smears or colon screening.  No     Pt has no other concerns

## 2020-02-27 LAB
ALBUMIN SERPL-MCNC: 4.1 G/DL (ref 3.5–5)
ALBUMIN/GLOB SERPL: 1.5 {RATIO} (ref 1.1–2.2)
ALP SERPL-CCNC: 42 U/L (ref 45–117)
ALT SERPL-CCNC: 89 U/L (ref 12–78)
ANION GAP SERPL CALC-SCNC: 8 MMOL/L (ref 5–15)
APPEARANCE UR: ABNORMAL
AST SERPL-CCNC: 31 U/L (ref 15–37)
BASOPHILS # BLD: 0 K/UL (ref 0–0.1)
BASOPHILS NFR BLD: 1 % (ref 0–1)
BILIRUB SERPL-MCNC: 0.8 MG/DL (ref 0.2–1)
BILIRUB UR QL: NEGATIVE
BUN SERPL-MCNC: 10 MG/DL (ref 6–20)
BUN/CREAT SERPL: 15 (ref 12–20)
CALCIUM SERPL-MCNC: 9 MG/DL (ref 8.5–10.1)
CHLORIDE SERPL-SCNC: 107 MMOL/L (ref 97–108)
CHOLEST SERPL-MCNC: 155 MG/DL
CO2 SERPL-SCNC: 25 MMOL/L (ref 21–32)
COLOR UR: ABNORMAL
CREAT SERPL-MCNC: 0.65 MG/DL (ref 0.55–1.02)
DIFFERENTIAL METHOD BLD: ABNORMAL
EOSINOPHIL # BLD: 0.2 K/UL (ref 0–0.4)
EOSINOPHIL NFR BLD: 3 % (ref 0–7)
ERYTHROCYTE [DISTWIDTH] IN BLOOD BY AUTOMATED COUNT: 13.3 % (ref 11.5–14.5)
GLOBULIN SER CALC-MCNC: 2.8 G/DL (ref 2–4)
GLUCOSE SERPL-MCNC: 103 MG/DL (ref 65–100)
GLUCOSE UR STRIP.AUTO-MCNC: NEGATIVE MG/DL
HCT VFR BLD AUTO: 44.5 % (ref 35–47)
HDLC SERPL-MCNC: 47 MG/DL
HDLC SERPL: 3.3 {RATIO} (ref 0–5)
HGB BLD-MCNC: 14.5 G/DL (ref 11.5–16)
HGB UR QL STRIP: NEGATIVE
IMM GRANULOCYTES # BLD AUTO: 0 K/UL (ref 0–0.04)
IMM GRANULOCYTES NFR BLD AUTO: 0 % (ref 0–0.5)
KETONES UR QL STRIP.AUTO: NEGATIVE MG/DL
LDLC SERPL CALC-MCNC: 92.2 MG/DL (ref 0–100)
LEUKOCYTE ESTERASE UR QL STRIP.AUTO: NEGATIVE
LIPID PROFILE,FLP: NORMAL
LYMPHOCYTES # BLD: 2.1 K/UL (ref 0.8–3.5)
LYMPHOCYTES NFR BLD: 33 % (ref 12–49)
MCH RBC QN AUTO: 32.7 PG (ref 26–34)
MCHC RBC AUTO-ENTMCNC: 32.6 G/DL (ref 30–36.5)
MCV RBC AUTO: 100.5 FL (ref 80–99)
MONOCYTES # BLD: 0.7 K/UL (ref 0–1)
MONOCYTES NFR BLD: 11 % (ref 5–13)
NEUTS SEG # BLD: 3.3 K/UL (ref 1.8–8)
NEUTS SEG NFR BLD: 52 % (ref 32–75)
NITRITE UR QL STRIP.AUTO: NEGATIVE
NRBC # BLD: 0 K/UL (ref 0–0.01)
NRBC BLD-RTO: 0 PER 100 WBC
PH UR STRIP: 5.5 [PH] (ref 5–8)
PLATELET # BLD AUTO: 212 K/UL (ref 150–400)
PMV BLD AUTO: 10.4 FL (ref 8.9–12.9)
POTASSIUM SERPL-SCNC: 3.7 MMOL/L (ref 3.5–5.1)
PROT SERPL-MCNC: 6.9 G/DL (ref 6.4–8.2)
PROT UR STRIP-MCNC: NEGATIVE MG/DL
RBC # BLD AUTO: 4.43 M/UL (ref 3.8–5.2)
SODIUM SERPL-SCNC: 140 MMOL/L (ref 136–145)
SP GR UR REFRACTOMETRY: 1.03 (ref 1–1.03)
TRIGL SERPL-MCNC: 79 MG/DL (ref ?–150)
TSH SERPL DL<=0.05 MIU/L-ACNC: 2.72 UIU/ML (ref 0.36–3.74)
UROBILINOGEN UR QL STRIP.AUTO: 0.2 EU/DL (ref 0.2–1)
VLDLC SERPL CALC-MCNC: 15.8 MG/DL
WBC # BLD AUTO: 6.3 K/UL (ref 3.6–11)

## 2020-03-02 NOTE — PROGRESS NOTES
Hey there, your labs look great, don't change a thing and recheck in 1 year, [address] [place] [address]

## 2020-03-12 ENCOUNTER — TELEPHONE (OUTPATIENT)
Dept: FAMILY MEDICINE CLINIC | Age: 30
End: 2020-03-12

## 2020-03-12 NOTE — TELEPHONE ENCOUNTER
Spoke with pharmacist in reference to pt, he states pt is trying to get med early. Per provider informed pharmacist that pt must wait the 30days.  He verbalized understanding

## 2020-03-12 NOTE — TELEPHONE ENCOUNTER
Spoke with pt id x3 she wants for the provider to resend the Rx w/o the \" must last 30 days\" per provider this cant be done. Pt states she wants a call from the provider to discuss this. the patient states if she needs to change pharmacies that she will. Informed pt that if she is thinking about changing pharmacies that she needs to come in for an office visit. Pt states to put a phone encounter in and have the provider call her.

## 2020-04-16 RX ORDER — PRAMIPEXOLE DIHYDROCHLORIDE 0.5 MG/1
TABLET ORAL
Qty: 30 TAB | Refills: 0 | Status: SHIPPED | OUTPATIENT
Start: 2020-04-16 | End: 2020-09-02 | Stop reason: SDUPTHER

## 2020-04-16 RX ORDER — BUDESONIDE AND FORMOTEROL FUMARATE DIHYDRATE 160; 4.5 UG/1; UG/1
AEROSOL RESPIRATORY (INHALATION)
Qty: 1 INHALER | Refills: 5 | Status: SHIPPED | OUTPATIENT
Start: 2020-04-16 | End: 2020-09-02 | Stop reason: SDUPTHER

## 2020-05-27 ENCOUNTER — TELEPHONE (OUTPATIENT)
Dept: PRIMARY CARE CLINIC | Age: 30
End: 2020-05-27

## 2020-05-27 NOTE — TELEPHONE ENCOUNTER
----- Message from [facility]. Cash sent at 5/26/2020  5:15 PM EDT -----  Regarding: Prescription Question  Contact: [phone]  Marleni,     The pharmacy is waiting for a new prior auth to be completed on my Adderall script. He says if the prior [employer] is back dated to todays date of service, I can be refunded for paying out of pocket. I had called about 10 days ago to get my refill and they say they sent the PA request across over then. Can you please let me know once this is done? Thanks so much!

## 2020-05-27 NOTE — TELEPHONE ENCOUNTER
PA for Adderall 20 mg faxed to [facility] [place], awaiting response.

## 2020-05-28 ENCOUNTER — TELEPHONE (OUTPATIENT)
Dept: FAMILY MEDICINE CLINIC | Age: 30
End: 2020-05-28

## 2020-05-28 NOTE — TELEPHONE ENCOUNTER
PA for Adderall 20 mg apprtoved from 5/28/2020 - 5/28/2021, copy faxed to pharmacy & Copy placed in scan folder to be scanned to chart.

## 2020-06-11 RX ORDER — CEFDINIR 300 MG/1
300 CAPSULE ORAL 2 TIMES DAILY
Qty: 20 CAP | Refills: 0 | Status: SHIPPED | OUTPATIENT
Start: 2020-06-11 | End: 2020-06-21

## 2020-07-01 ENCOUNTER — VIRTUAL VISIT (OUTPATIENT)
Dept: FAMILY MEDICINE CLINIC | Age: 30
End: 2020-07-01

## 2020-07-01 DIAGNOSIS — F90.2 ATTENTION DEFICIT HYPERACTIVITY DISORDER (ADHD), COMBINED TYPE: ICD-10-CM

## 2020-07-01 RX ORDER — DEXTROAMPHETAMINE SACCHARATE, AMPHETAMINE ASPARTATE, DEXTROAMPHETAMINE SULFATE AND AMPHETAMINE SULFATE 5; 5; 5; 5 MG/1; MG/1; MG/1; MG/1
20 TABLET ORAL 2 TIMES DAILY
Qty: 60 TAB | Refills: 0 | Status: SHIPPED | OUTPATIENT
Start: 2020-07-06 | End: 2020-10-14 | Stop reason: SDUPTHER

## 2020-07-01 RX ORDER — DEXTROAMPHETAMINE SACCHARATE, AMPHETAMINE ASPARTATE, DEXTROAMPHETAMINE SULFATE AND AMPHETAMINE SULFATE 5; 5; 5; 5 MG/1; MG/1; MG/1; MG/1
20 TABLET ORAL 2 TIMES DAILY
Qty: 60 TAB | Refills: 0 | Status: SHIPPED | OUTPATIENT
Start: 2020-08-06 | End: 2020-09-10 | Stop reason: SDUPTHER

## 2020-07-01 RX ORDER — DEXTROAMPHETAMINE SACCHARATE, AMPHETAMINE ASPARTATE, DEXTROAMPHETAMINE SULFATE AND AMPHETAMINE SULFATE 5; 5; 5; 5 MG/1; MG/1; MG/1; MG/1
20 TABLET ORAL 2 TIMES DAILY
Qty: 60 TAB | Refills: 0 | Status: SHIPPED | OUTPATIENT
Start: 2020-09-06 | End: 2020-09-10 | Stop reason: SDUPTHER

## 2020-07-01 NOTE — PROGRESS NOTES
Ryan Sidhu is a 34 y.o. female who was seen by synchronous (real-time) audio-video technology on 7/1/2020 for Medication Refill      I spent at least 15 minutes on this visit with this established patient. 712  Subjective:   Patient returns to office for follow up ADD. Stable on medication without weight loss, decreased appetite, insomnia, or tremor. Good focus control. Gets three months of scripts at a time. See med order for dose. Prior to Admission medications    Medication Sig Start Date End Date Taking? Authorizing Provider   dextroamphetamine-amphetamine (ADDERALL) 20 mg tablet Take 1 Tab by mouth two (2) times a day. - must last 30 days 9/6/20  Yes Chad Reyes NP   dextroamphetamine-amphetamine (ADDERALL) 20 mg tablet Take 1 Tab by mouth two (2) times a day. - must last 30 days 8/6/20  Yes Chad Reyes NP   dextroamphetamine-amphetamine (ADDERALL) 20 mg tablet Take 1 Tab by mouth two (2) times a day. - must last 30 days 7/6/20  Yes Chad Reyes NP   budesonide-formoteroL (SYMBICORT) 160-4.5 mcg/actuation HFAA INHALE 2 PUFFS BY MOUTH TWICE DAILY 4/16/20   Chad Reyes NP   pramipexole (MIRAPEX) 0.5 mg tablet TAKE 1 TABLET BY MOUTH EVERY NIGHT AT 7 PM 4/16/20   Chad Reyes NP   baclofen (LIORESAL) 10 mg tablet Take 1 Tab by mouth three (3) times daily. As needed for spasm 4/6/20   Chad Reyes NP   dextroamphetamine-amphetamine (ADDERALL) 20 mg tablet Take 1 Tab by mouth two (2) times a day. - must last 30 days 5/6/20 7/1/20  Chad Reyes NP   dextroamphetamine-amphetamine (ADDERALL) 20 mg tablet Take 1 Tab by mouth two (2) times a day. - must last 30 days 4/6/20 7/1/20  Chad Reyes NP   hydrocortisone (HYTONE) 2.5 % ointment Apply  to affected area two (2) times a day. use thin layer 11/22/19   Chad Reyes NP   dextroamphetamine-amphetamine (ADDERALL) 20 mg tablet Take 1 Tab by mouth two (2) times a day.  - must last 30 days 11/30/19 7/1/20  Abisai To NP   LORazepam (ATIVAN) 1 mg tablet TAKE 1 TABLET BY MOUTH EVERY 4 HOURS AS NEEDED FOR ANXIETY MUST LAST 30 DAYS 8/28/19   Abisai To NP   docosahexanoic acid/epa (FISH OIL PO) Take 2,000 Units by mouth. Provider, Historical   VENTOLIN HFA 90 mcg/actuation inhaler INHALE 2 PUFFS BY MOUTH EVERY 4 HOURS AS NEEDED FOR WHEEZING 1/22/19   Abisai To NP   ibuprofen (MOTRIN) 600 mg tablet Take 1 Tab by mouth every eight (8) hours as needed for Pain. 9/1/17   Yenifer Ramey MD     Patient Active Problem List    Diagnosis Date Noted    Obesity, morbid ([facility] [address] 12/06/2017    Chest pain 11/25/2015    Other bipolar disorders 01/30/2014    Somatization disorder 01/30/2014    Anxiety 01/30/2014    Attention deficit hyperactivity disorder (ADHD), combined type 01/30/2014    Asthma 10/29/2013    Pseudotumor cerebri 08/09/2012    Headache(784.0) 08/03/2011    Anxiety associated with depression 03/25/2011       ROS  A comprehensive review of system was obtained and negative except findings in the HPI    Objective:   No flowsheet data found. General: alert, cooperative, no distress   Mental  status: normal mood, behavior, speech, dress, motor activity, and thought processes, able to follow commands   HENT: NCAT   Neck: no visualized mass   Resp: no respiratory distress   Neuro: no gross deficits   Skin: no discoloration or lesions of concern on visible areas   Psychiatric: normal affect, consistent with stated mood, no evidence of hallucinations     Additional exam findings: none    Diagnoses and all orders for this visit:    1. Attention deficit hyperactivity disorder (ADHD), combined type  -     dextroamphetamine-amphetamine (ADDERALL) 20 mg tablet; Take 1 Tab by mouth two (2) times a day. - must last 30 days  -     dextroamphetamine-amphetamine (ADDERALL) 20 mg tablet; Take 1 Tab by mouth two (2) times a day.  - must last 30 days  -     dextroamphetamine-amphetamine (ADDERALL) 20 mg tablet; Take 1 Tab by mouth two (2) times a day. - must last 30 days      Refills updated x 3 mo      We discussed the expected course, resolution and complications of the diagnosis(es) in detail. Medication risks, benefits, costs, interactions, and alternatives were discussed as indicated. I advised her to contact the office if her condition worsens, changes or fails to improve as anticipated. She expressed understanding with the diagnosis(es) and plan. [address] [place] [address], who was evaluated through a patient-initiated, synchronous (real-time) audio-video encounter, and/or her healthcare decision maker, is aware that it is a billable service, with coverage as determined by her insurance carrier. She provided verbal consent to proceed: Yes, and patient identification was verified. It was conducted pursuant to the emergency declaration under the [facility], [address] [place] [address] authority and the [employer] General Act. A caregiver was present when appropriate. Ability to conduct physical exam was limited. I was in the office. The patient was at home.       Brody Franco NP

## 2020-07-28 ENCOUNTER — TELEPHONE (OUTPATIENT)
Dept: FAMILY MEDICINE CLINIC | Age: 30
End: 2020-07-28

## 2020-07-28 RX ORDER — VALACYCLOVIR HYDROCHLORIDE 500 MG/1
500 TABLET, FILM COATED ORAL 3 TIMES DAILY
Qty: 21 TAB | Refills: 2 | Status: SHIPPED | OUTPATIENT
Start: 2020-07-28 | End: 2020-08-04

## 2020-08-03 ENCOUNTER — TELEPHONE (OUTPATIENT)
Dept: FAMILY MEDICINE CLINIC | Age: 30
End: 2020-08-03

## 2020-08-03 ENCOUNTER — HOSPITAL ENCOUNTER (EMERGENCY)
Age: 30
Discharge: HOME | End: 2020-08-03
Attending: EMERGENCY MEDICINE
Payer: COMMERCIAL

## 2020-08-03 ENCOUNTER — APPOINTMENT (OUTPATIENT)
Dept: GENERAL RADIOLOGY | Age: 30
End: 2020-08-03
Attending: EMERGENCY MEDICINE
Payer: COMMERCIAL

## 2020-08-03 VITALS
BODY MASS INDEX: 33.04 KG/M2 | WEIGHT: 210.54 LBS | DIASTOLIC BLOOD PRESSURE: 74 MMHG | RESPIRATION RATE: 15 BRPM | HEIGHT: 67 IN | SYSTOLIC BLOOD PRESSURE: 122 MMHG | TEMPERATURE: 98 F | OXYGEN SATURATION: 97 % | HEART RATE: 71 BPM

## 2020-08-03 DIAGNOSIS — R07.89 OTHER CHEST PAIN: Primary | ICD-10-CM

## 2020-08-03 DIAGNOSIS — R06.02 SOB (SHORTNESS OF BREATH): ICD-10-CM

## 2020-08-03 LAB
ALBUMIN SERPL-MCNC: 3.8 G/DL (ref 3.5–5)
ALBUMIN/GLOB SERPL: 1.1 {RATIO} (ref 1.1–2.2)
ALP SERPL-CCNC: 44 U/L (ref 45–117)
ALT SERPL-CCNC: 27 U/L (ref 12–78)
ANION GAP SERPL CALC-SCNC: 6 MMOL/L (ref 5–15)
APPEARANCE UR: CLEAR
AST SERPL-CCNC: 16 U/L (ref 15–37)
BACTERIA URNS QL MICRO: NEGATIVE /HPF
BASOPHILS # BLD: 0 K/UL (ref 0–0.1)
BASOPHILS NFR BLD: 0 % (ref 0–1)
BILIRUB SERPL-MCNC: 0.4 MG/DL (ref 0.2–1)
BILIRUB UR QL: NEGATIVE
BUN SERPL-MCNC: 21 MG/DL (ref 6–20)
BUN/CREAT SERPL: 25 (ref 12–20)
CALCIUM SERPL-MCNC: 8.8 MG/DL (ref 8.5–10.1)
CHLORIDE SERPL-SCNC: 104 MMOL/L (ref 97–108)
CO2 SERPL-SCNC: 28 MMOL/L (ref 21–32)
COLOR UR: ABNORMAL
CREAT SERPL-MCNC: 0.83 MG/DL (ref 0.55–1.02)
D DIMER PPP FEU-MCNC: <0.19 MG/L FEU (ref 0–0.65)
DIFFERENTIAL METHOD BLD: NORMAL
EOSINOPHIL # BLD: 0.2 K/UL (ref 0–0.4)
EOSINOPHIL NFR BLD: 2 % (ref 0–7)
EPITH CASTS URNS QL MICRO: ABNORMAL /LPF
ERYTHROCYTE [DISTWIDTH] IN BLOOD BY AUTOMATED COUNT: 11.6 % (ref 11.5–14.5)
GLOBULIN SER CALC-MCNC: 3.4 G/DL (ref 2–4)
GLUCOSE SERPL-MCNC: 112 MG/DL (ref 65–100)
GLUCOSE UR STRIP.AUTO-MCNC: NEGATIVE MG/DL
HCG UR QL: NEGATIVE
HCT VFR BLD AUTO: 41.7 % (ref 35–47)
HGB BLD-MCNC: 14.4 G/DL (ref 11.5–16)
HGB UR QL STRIP: ABNORMAL
IMM GRANULOCYTES # BLD AUTO: 0 K/UL (ref 0–0.04)
IMM GRANULOCYTES NFR BLD AUTO: 0 % (ref 0–0.5)
KETONES UR QL STRIP.AUTO: NEGATIVE MG/DL
LEUKOCYTE ESTERASE UR QL STRIP.AUTO: NEGATIVE
LIPASE SERPL-CCNC: 153 U/L (ref 73–393)
LYMPHOCYTES # BLD: 2.2 K/UL (ref 0.8–3.5)
LYMPHOCYTES NFR BLD: 26 % (ref 12–49)
MAGNESIUM SERPL-MCNC: 1.8 MG/DL (ref 1.6–2.4)
MCH RBC QN AUTO: 33.3 PG (ref 26–34)
MCHC RBC AUTO-ENTMCNC: 34.5 G/DL (ref 30–36.5)
MCV RBC AUTO: 96.3 FL (ref 80–99)
MONOCYTES # BLD: 0.5 K/UL (ref 0–1)
MONOCYTES NFR BLD: 6 % (ref 5–13)
MUCOUS THREADS URNS QL MICRO: ABNORMAL /LPF
NEUTS SEG # BLD: 5.5 K/UL (ref 1.8–8)
NEUTS SEG NFR BLD: 65 % (ref 32–75)
NITRITE UR QL STRIP.AUTO: NEGATIVE
NRBC # BLD: 0 K/UL (ref 0–0.01)
NRBC BLD-RTO: 0 PER 100 WBC
PH UR STRIP: 6 [PH] (ref 5–8)
PLATELET # BLD AUTO: 209 K/UL (ref 150–400)
PMV BLD AUTO: 9.7 FL (ref 8.9–12.9)
POTASSIUM SERPL-SCNC: 3.6 MMOL/L (ref 3.5–5.1)
PROT SERPL-MCNC: 7.2 G/DL (ref 6.4–8.2)
PROT UR STRIP-MCNC: NEGATIVE MG/DL
RBC # BLD AUTO: 4.33 M/UL (ref 3.8–5.2)
RBC #/AREA URNS HPF: ABNORMAL /HPF (ref 0–5)
SODIUM SERPL-SCNC: 138 MMOL/L (ref 136–145)
SP GR UR REFRACTOMETRY: 1.02 (ref 1–1.03)
TROPONIN I SERPL-MCNC: <0.05 NG/ML
UROBILINOGEN UR QL STRIP.AUTO: 0.2 EU/DL (ref 0.2–1)
WBC # BLD AUTO: 8.5 K/UL (ref 3.6–11)
WBC URNS QL MICRO: ABNORMAL /HPF (ref 0–4)

## 2020-08-03 PROCEDURE — 85379 FIBRIN DEGRADATION QUANT: CPT

## 2020-08-03 PROCEDURE — 93005 ELECTROCARDIOGRAM TRACING: CPT

## 2020-08-03 PROCEDURE — 83690 ASSAY OF LIPASE: CPT

## 2020-08-03 PROCEDURE — 80053 COMPREHEN METABOLIC PANEL: CPT

## 2020-08-03 PROCEDURE — 83735 ASSAY OF MAGNESIUM: CPT

## 2020-08-03 PROCEDURE — 81025 URINE PREGNANCY TEST: CPT

## 2020-08-03 PROCEDURE — 85025 COMPLETE CBC W/AUTO DIFF WBC: CPT

## 2020-08-03 PROCEDURE — 36415 COLL VENOUS BLD VENIPUNCTURE: CPT

## 2020-08-03 PROCEDURE — 81001 URINALYSIS AUTO W/SCOPE: CPT

## 2020-08-03 PROCEDURE — 84484 ASSAY OF TROPONIN QUANT: CPT

## 2020-08-03 PROCEDURE — 99285 EMERGENCY DEPT VISIT HI MDM: CPT

## 2020-08-03 PROCEDURE — 71046 X-RAY EXAM CHEST 2 VIEWS: CPT

## 2020-08-03 NOTE — ED NOTES
Leader rounding completed. Introduced self to patient and explained role as director and purpose of leader rounding. Answered any questions regarding care. Advised to let me know if they have any needs or if they would like to recognize anyone that provided them with care today.

## 2020-08-03 NOTE — TELEPHONE ENCOUNTER
Received transferred call from pt. Pt states that she has been having chest pain and zero energy since yesterday. Reports that that chest pain feels mostly like a pressure that is to the (L) of her chest and under her arm that is constant. The pressure makes it hard for her to breathe and at times she feels an ache. She also states that she has been extremely fatigued. Yesterday she took a lorazepam because in the past she when she similar sx's it was her anxiety, but she states that this does not feel the same. Advised pt to go to either the [facility], or the [address] [place] [address].

## 2020-08-03 NOTE — ED PROVIDER NOTES
Please note that this dictation was completed with [employer], the computer voice recognition software.  Quite often unanticipated grammatical, syntax, homophones, and other interpretive errors are inadvertently transcribed by the computer software.  Please disregard these errors.  Please excuse any errors that have escaped final proofreading. 28-year-old female past medical history markable for asthma, essential hypertension with pregnancy, pseudotumor cerebri, depression, anxiety, iron deficiency anemia and seizures presents the ER complaining of \"felt short of breath x2 to 3 days. Patient states then yesterday she started having left-sided chest pain felt like an ache constant increase with deep breaths or exertion \"made my breathing worse. Patient denies overt cough previous episodes of chest pain associated with shortness of breath such as this. Patient states she has been afebrile tolerating p.o. normally further denies vaginal discharge burning with urination patient states her last menstrual cycle was \"right now. \"  Patient states she initially called her PCP who advised her to come to the ER for emergent evaluation.   She last took ibuprofen for this pain prior to this morning at 7 AM    pt denies trauma, HA, vison changes, diff swallowing, CP, SOB, Abd pain, F/Ch, N/V, D/Cons or other current systemic complaints    Social/ PSH reviewed in EMR    EMR Chart Reviewed               Past Medical History:   Diagnosis Date    Asthma     Essential hypertension     last pregnancy; current pregnancy    Ill-defined condition     pseudo-tumor cerebri    Iron deficiency anemia     Other ill-defined conditions(799.89)     pseudo tumor cerebrae    Psychiatric disorder     depression, anxiety    Seizure [facility])        Past Surgical History:   Procedure Laterality Date     DELIVERY ONLY     [address] [place] [address]  DELIVERY ONLY      HX DILATION AND CURETTAGE      HX GYN       , ,    HX TUBAL LIGATION           Family History:   Problem Relation Age of Onset    Lung Disease Maternal Grandmother     No Known Problems Mother     Hypertension Father        Social History     Socioeconomic History    Marital status: SINGLE     Spouse name: Not on file    Number of children: Not on file    Years of education: Not on file    Highest education level: Not on file   Occupational History    Not on file   Social Needs    Financial resource strain: Not on file    Food insecurity     Worry: Not on file     Inability: Not on file    Transportation needs     Medical: Not on file     Non-medical: Not on file   Tobacco Use    Smoking status: Former Smoker     Packs/day: 0.50     Years: 0.50     Pack years: 0.25     Types: Cigarettes     Last attempt to quit: 2015     Years since quittin.5    Smokeless tobacco: Never Used    Tobacco comment: socially, vapor pen   Substance and Sexual Activity    Alcohol use:  Yes     Alcohol/week: 0.0 standard drinks     Frequency: Monthly or less     Comment: socially    Drug use: No    Sexual activity: Yes     Partners: Male     Birth control/protection: None   Lifestyle    Physical activity     Days per week: Not on file     Minutes per session: Not on file    Stress: Not on file   Relationships    Social connections     Talks on phone: Not on file     Gets together: Not on file     Attends Gnosticist service: Not on file     Active member of club or organization: Not on file     Attends meetings of clubs or organizations: Not on file     Relationship status: Not on file    Intimate partner violence     Fear of current or ex partner: Not on file     Emotionally abused: Not on file     Physically abused: Not on file     Forced sexual activity: Not on file   Other Topics Concern     Service Not Asked    Blood Transfusions Not Asked    Caffeine Concern Not Asked    Occupational Exposure Not Asked    Hobby Hazards Not Asked    Sleep Concern Not Asked    Stress Concern Not Asked    Weight Concern Not Asked    Special Diet Not Asked    Back Care Not Asked    Exercise Not Asked    Bike Helmet Not Asked   [address] Not Asked    Self-Exams Not Asked   Social History Narrative    Not on file         ALLERGIES: Diclofenac and Sulfa (sulfonamide antibiotics)    Review of Systems   Constitutional: Negative for chills and fever. HENT: Negative for trouble swallowing and voice change. Eyes: Negative for visual disturbance. Respiratory: Positive for chest tightness and shortness of breath. Negative for cough. Cardiovascular: Positive for chest pain. Gastrointestinal: Negative for abdominal pain, diarrhea, nausea and vomiting. Genitourinary: Positive for vaginal bleeding. Negative for decreased urine volume, dysuria, urgency and vaginal discharge. Musculoskeletal: Negative for gait problem. Skin: Negative for rash. Neurological: Negative for speech difficulty. All other systems reviewed and are negative. Vitals:    08/03/20 1200 08/03/20 1230 08/03/20 1300 08/03/20 1426   BP: 143/79 (!) 120/92 125/79 122/74   Pulse:    71   Resp:  16 17 15   Temp:       SpO2: 99% 98% 98% 97%   Weight:       Height:                Physical Exam  Vitals signs and nursing note reviewed. Constitutional:       General: She is not in acute distress. Appearance: Normal appearance. She is well-developed. She is not ill-appearing, toxic-appearing or diaphoretic. Comments: NAD, AxOx4, speaking in complete sentences       HENT:      Head: Normocephalic and atraumatic. Right Ear: External ear normal.      Left Ear: External ear normal.   Eyes:      General: No scleral icterus. Right eye: No discharge. Left eye: No discharge. Extraocular Movements: Extraocular movements intact. Conjunctiva/sclera: Conjunctivae normal.      Pupils: Pupils are equal, round, and reactive to light.    Neck:      Musculoskeletal: Normal range of motion and neck supple. No muscular tenderness. Vascular: No JVD. Trachea: No tracheal deviation. Cardiovascular:      Rate and Rhythm: Normal rate and regular rhythm. Pulses: Normal pulses. Heart sounds: Normal heart sounds. No murmur. No friction rub. No gallop. Pulmonary:      Effort: Pulmonary effort is normal. No respiratory distress. Breath sounds: Normal breath sounds. No wheezing or rales. Chest:      Chest wall: No tenderness. Abdominal:      General: Bowel sounds are normal.      Palpations: Abdomen is soft. Tenderness: There is no abdominal tenderness. There is no guarding or rebound. Comments: nttp     Genitourinary:     Vagina: No vaginal discharge. Comments: Pt denies urinary/ vaginal complaints  Musculoskeletal: Normal range of motion. General: No swelling, tenderness, deformity or signs of injury. Skin:     General: Skin is warm and dry. Capillary Refill: Capillary refill takes less than 2 seconds. Coloration: Skin is not jaundiced or pale. Findings: No bruising, erythema or rash. Neurological:      General: No focal deficit present. Mental Status: She is alert and oriented to person, place, and time. Cranial Nerves: No cranial nerve deficit. Sensory: No sensory deficit. Motor: No weakness or abnormal muscle tone. Coordination: Coordination normal.      Gait: Gait normal.      Deep Tendon Reflexes: Reflexes normal.      Comments: pt has motor/ CV/ Sensation grossly intact to all extremities, R = L in strength;   Psychiatric:         Behavior: Behavior normal.         Thought Content: Thought content normal.          MDM       Procedures    No chief complaint on file. 11:34 AM  The patients presenting problems have been discussed, and they are in agreement with the care plan formulated and outlined with them.   I have encouraged them to ask questions as they arise throughout their visit.    MEDICATIONS GIVEN:  Medications - No data to display    LABS REVIEWED:  Labs Reviewed - No data to display    RADIOLOGY RESULTS:  The following have been ordered and reviewed:  _____________________________________________________________________  _____________________________________________________________________    EKG interpretation:   Rhythm: normal sinus rhythm; and regular . Rate (approx.): 76; Axis: normal; P wave: normal; QRS interval: normal ; ST/T wave: normal; Negative acute significant segmental elevations/ unchanged compared to study dated 07/25/2017    PROCEDURES:        CONSULTATIONS:       PROGRESS NOTES:      DIAGNOSIS:    1. Other chest pain    2. SOB (shortness of breath)        PLAN:  1-SOB/ Chest Pain / neg ed evaluation - will have pt follow-up with Cardiology;       ED COURSE: The patients hospital course has been uncomplicated. 1:21 PM  'doing ok'; told of reassuring labs/ awaiting CXR results;       2:01 PM  Sierra Harrison's  results have been reviewed with her. She has been counseled regarding her diagnosis. She verbally conveys understanding and agreement of the signs, symptoms, diagnosis, treatment and prognosis and additionally agrees to Call/ Arrange follow up as recommended with Dr. Nicole Mercado NP in 24 - 48 hours. She also agrees with the care-plan and conveys that all of her questions have been answered. I have also put together some discharge instructions for her that include: 1) educational information regarding their diagnosis, 2) how to care for their diagnosis at home, as well a 3) list of reasons why they would want to return to the ED prior to their follow-up appointment, should their condition change or for concerns.

## 2020-08-03 NOTE — ED NOTES
The patient was discharged home by Dr Maxi Pina in stable condition. The patient is alert and oriented, in no respiratory distress. The patient's diagnosis, condition and treatment were explained. The patient expressed understanding. A discharge plan has been developed. A  was not involved in the process. Aftercare instructions were given. Pt ambulatory out of the ED.

## 2020-08-03 NOTE — DISCHARGE INSTRUCTIONS
Patient Education        Shortness of Breath: Care Instructions  Your Care Instructions  Shortness of breath has many causes. Sometimes conditions such as anxiety can lead to shortness of breath. Some people get mild shortness of breath when they exercise. Trouble breathing also can be a symptom of a serious problem, such as asthma, lung disease, emphysema, heart problems, and pneumonia. If your shortness of breath continues, you may need tests and treatment. Watch for any changes in your breathing and other symptoms. Follow-up care is a key part of your treatment and safety. Be sure to make and go to all appointments, and call your doctor if you are having problems. It's also a good idea to know your test results and keep a list of the medicines you take. How can you care for yourself at home? · Do not smoke or allow others to smoke around you. If you need help quitting, talk to your doctor about stop-smoking programs and medicines. These can increase your chances of quitting for good. · Get plenty of rest and sleep. · Take your medicines exactly as prescribed. Call your doctor if you think you are having a problem with your medicine. · Find healthy ways to deal with stress. ? Exercise daily. ? Get plenty of sleep. ? Eat regularly and well. When should you call for help? [id number] anytime you think you may need emergency care. For example, call if:  · You have severe shortness of breath. · You have symptoms of a heart attack. These may include:  ? Chest pain or pressure, or a strange feeling in the chest.  ? Sweating. ? Shortness of breath. ? Nausea or vomiting. ? Pain, pressure, or a strange feeling in the back, neck, jaw, or upper belly or in one or both shoulders or arms. ? Lightheadedness or sudden weakness. ? A fast or irregular heartbeat. After you call 911, the  may tell you to chew 1 adult-strength or 2 to 4 low-dose aspirin. Wait for an ambulance.  Do not try to drive yourself. Call your doctor now or seek immediate medical care if:  · Your shortness of breath gets worse or you start to wheeze. Wheezing is a high-pitched sound when you breathe. · You wake up at night out of breath or have to prop your head up on several pillows to breathe. · You are short of breath after only light activity or while at rest.  Watch closely for changes in your health, and be sure to contact your doctor if:  · You do not get better over the next 1 to 2 days. Where can you learn more? Go to http://ezequiel-mitchell.info/  Enter S780 in the search box to learn more about \"Shortness of Breath: Care Instructions. \"  Current as of: February 24, 2020               Content Version: 12.5  © 1111-2267 [employer]. Care instructions adapted under license by [employer] (which disclaims liability or warranty for this information). If you have questions about a medical condition or this instruction, always ask your healthcare professional. [place] [address] any warranty or liability for your use of this information. Patient Education        Chest Pain: Care Instructions  Your Care Instructions     There are many things that can cause chest pain. Some are not serious and will get better on their own in a few days. But some kinds of chest pain need more testing and treatment. Your doctor may have recommended a follow-up visit in the next 8 to 12 hours. If you are not getting better, you may need more tests or treatment. Even though your doctor has released you, you still need to watch for any problems. The doctor carefully checked you, but sometimes problems can develop later. If you have new symptoms or if your symptoms do not get better, get medical care right away. If you have worse or different chest pain or pressure that lasts more than 5 minutes or you passed out (lost consciousness), [id number] or seek other emergency help right away.    A medical visit is only one step in your treatment. Even if you feel better, you still need to do what your doctor recommends, such as going to all suggested follow-up appointments and taking medicines exactly as directed. This will help you recover and help prevent future problems. How can you care for yourself at home? · Rest until you feel better. · Take your medicine exactly as prescribed. Call your doctor if you think you are having a problem with your medicine. · Do not drive after taking a prescription pain medicine. When should you call for help? [id number]:   · You passed out (lost consciousness). · You have severe difficulty breathing. · You have symptoms of a heart attack. These may include:  ? Chest pain or pressure, or a strange feeling in your chest.  ? Sweating. ? Shortness of breath. ? Nausea or vomiting. ? Pain, pressure, or a strange feeling in your back, neck, jaw, or upper belly or in one or both shoulders or arms. ? Lightheadedness or sudden weakness. ? A fast or irregular heartbeat. After you call 911, the  may tell you to chew 1 adult-strength or 2 to 4 low-dose aspirin. Wait for an ambulance. Do not try to drive yourself. Call your doctor today if:   · You have any trouble breathing. · Your chest pain gets worse. · You are dizzy or lightheaded, or you feel like you may faint. · You are not getting better as expected. · You are having new or different chest pain. Where can you learn more? Go to http://ezequiel-mitchell.info/  Enter A120 in the search box to learn more about \"Chest Pain: Care Instructions. \"  Current as of: June 26, 2019               Content Version: 12.5  © 8010-8510 [employer]. Care instructions adapted under license by [employer] (which disclaims liability or warranty for this information).  If you have questions about a medical condition or this instruction, always ask your healthcare professional. [employer] disclaims any warranty or liability for your use of this information.

## 2020-08-03 NOTE — ED NOTES
IV access established and blood samples obtained for ordered testing. Patient tolerated well. Patient updated regarding plan of care and associated time constraints. Patient verbalizes understanding and agreement.

## 2020-08-03 NOTE — LETTER
[facility] EMERGENCY DEPT 
[address] [facility] [id number] 
[phone] Work/School Note Date: 8/3/2020 To Whom It May concern: 
 
Raegan Martinez was seen and treated today in the emergency room by the following provider(s): 
Attending Provider: Vivian Morillo 88 may return to work on 08/05/2020.  
 
Sincerely, 
 
 
 
 
Luci Keating RN

## 2020-08-04 ENCOUNTER — PATIENT OUTREACH (OUTPATIENT)
Dept: CASE MANAGEMENT | Age: 30
End: 2020-08-04

## 2020-08-05 LAB
ATRIAL RATE: 77 BPM
CALCULATED P AXIS, ECG09: 28 DEGREES
CALCULATED R AXIS, ECG10: 45 DEGREES
CALCULATED T AXIS, ECG11: 28 DEGREES
DIAGNOSIS, 93000: NORMAL
P-R INTERVAL, ECG05: 150 MS
Q-T INTERVAL, ECG07: 404 MS
QRS DURATION, ECG06: 84 MS
QTC CALCULATION (BEZET), ECG08: 457 MS
VENTRICULAR RATE, ECG03: 77 BPM

## 2020-08-19 NOTE — ED NOTES
Patient to endo at this time. Handoff report given to endo RN. Purposeful rounding completed. Toileting offered, ongoing plan of care discussed and pts concerns/questions addressed. Pain reassessed. Pt informed of time factors with lab/imaging study results. Pt resting on the stretcher in a position of comfort. Call bell within reach; will continue to monitor.

## 2020-08-26 NOTE — PROGRESS NOTES
Cardiac Electrophysiology OFFICE Consultation Note     Subjective:      Dylon Samuel is a 31 y.o. patient who is seen for evaluation of shortness of breath & occasional chest pain. She presented to the ER on 08/03/2020 with 2-3 day history of SOB, 1 day history of left sided chest pain (constant ache) that increased with deep breaths or exertion. Afebrile. Dr Richard Adame recommended her to come to see me  She used to work in [employer] office and is referred by Dr Clary Burroughs Dermatologist as well  She has asthma, essential hypertension with pregnancy, pseudotumor cerebri, depression, anxiety, iron deficiency anemia and seizures  She said she had low potassium problem and had chest pain like this before so she wanted to check in ER  Abdominal CT 2017 no abnormality but renal calculi  Chest xray normal    Troponin negative. D-dimer negative. CXR WNL. ECG showed NSR (77 bpm) no acute ST T wave changes. Since then she has been trying to lose weight and exercise      Of note, previously evaluated for similar issue in 2015; see below for results. Previous:  Seen in ER with chest pain & dizziness in 07/2017. ECG showed NSR, troponin negative. Echo (12/11/2015): LVEF 55-60%, no RWMA. No significant structural abnormality noted. Treadmill stress test (12/11/2015): Negative. Holter (11/25/2015): HR  bpm.  Longest episode of tachycardia 2 minutes. 2.91% supraventricular ectopic beats, rare PVCs. Rare episodes of ectopic atrial rhythms. Frequent PACs, some bigeminy & trigeminy. Long RP tachycardia    Previous evaluation in 2015 after ER visit 11/20/2015 for chest pain, SOB, & palpitations. Saw Dr. Elvira Romberg afterward. Denies family history of heart problems.       Problem List  Date Reviewed: 2/26/2020          Codes Class Noted    Obesity, morbid [facility]) ICD-10-CM: E66.01  ICD-9-CM: 278.01  12/6/2017        Chest pain ICD-10-CM: R07.9  ICD-9-CM: 786.50  11/25/2015        Other bipolar disorders ICD-10-CM: F31.89  ICD-9-CM: 296.89  1/30/2014        Somatization disorder ICD-10-CM: F45.0  ICD-9-CM: 300.81  1/30/2014        Anxiety ICD-10-CM: F41.9  ICD-9-CM: 300.00  1/30/2014        Attention deficit hyperactivity disorder (ADHD), combined type ICD-10-CM: F90.2  ICD-9-CM: 314.01  1/30/2014        Asthma ICD-10-CM: J45.909  ICD-9-CM: 493.90  10/29/2013        Pseudotumor cerebri ICD-10-CM: G93.2  ICD-9-CM: 348.2  8/9/2012        Headache(784.0) ICD-10-CM: R51  ICD-9-CM: 784.0  8/3/2011        Anxiety associated with depression ICD-10-CM: F41.8  ICD-9-CM: 300.4  3/25/2011              Current Outpatient Medications   Medication Sig Dispense Refill    [START ON 9/6/2020] dextroamphetamine-amphetamine (ADDERALL) 20 mg tablet Take 1 Tab by mouth two (2) times a day. - must last 30 days 60 Tab 0    dextroamphetamine-amphetamine (ADDERALL) 20 mg tablet Take 1 Tab by mouth two (2) times a day. - must last 30 days 60 Tab 0    dextroamphetamine-amphetamine (ADDERALL) 20 mg tablet Take 1 Tab by mouth two (2) times a day. - must last 30 days 60 Tab 0    budesonide-formoteroL (SYMBICORT) 160-4.5 mcg/actuation HFAA INHALE 2 PUFFS BY MOUTH TWICE DAILY 1 Inhaler 5    pramipexole (MIRAPEX) 0.5 mg tablet TAKE 1 TABLET BY MOUTH EVERY NIGHT AT 7 PM 30 Tab 0    baclofen (LIORESAL) 10 mg tablet Take 1 Tab by mouth three (3) times daily. As needed for spasm 30 Tab 0    hydrocortisone (HYTONE) 2.5 % ointment Apply  to affected area two (2) times a day. use thin layer 30 g 0    LORazepam (ATIVAN) 1 mg tablet TAKE 1 TABLET BY MOUTH EVERY 4 HOURS AS NEEDED FOR ANXIETY MUST LAST 30 DAYS 60 Tab 2    docosahexanoic acid/epa (FISH OIL PO) Take 2,000 Units by mouth.  VENTOLIN HFA 90 mcg/actuation inhaler INHALE 2 PUFFS BY MOUTH EVERY 4 HOURS AS NEEDED FOR WHEEZING 1 Inhaler 2    ibuprofen (MOTRIN) 600 mg tablet Take 1 Tab by mouth every eight (8) hours as needed for Pain.  30 Tab 0     Allergies   Allergen Reactions    Diclofenac Nausea Only    Sulfa (Sulfonamide Antibiotics) Hives and Other (comments)     gas     Past Medical History:   Diagnosis Date    ADHD     Asthma     Essential hypertension     last pregnancy; current pregnancy    Ill-defined condition     pseudo-tumor cerebri    Iron deficiency anemia     Other ill-defined conditions(799.89)     pseudo tumor cerebrae    Seizure [facility])      Past Surgical History:   Procedure Laterality Date     DELIVERY ONLY     [facility]  DELIVERY ONLY      HX DILATION AND CURETTAGE      HX GYN       , ,    HX TUBAL LIGATION       Family History   Problem Relation Age of Onset    Lung Disease Maternal Grandmother     No Known Problems Mother     Hypertension Father      Social History     Tobacco Use    Smoking status: Former Smoker     Packs/day: 0.50     Years: 0.50     Pack years: 0.25     Types: Cigarettes     Last attempt to quit: 2015     Years since quittin.6    Smokeless tobacco: Never Used    Tobacco comment: socially, vapor pen   Substance Use Topics    Alcohol use: Yes     Alcohol/week: 0.0 standard drinks     Frequency: Monthly or less     Comment: socially        Review of Systems: Review of all other systems otherwise negative. Constitutional: Negative for fever, chills, weight loss, malaise/fatigue. HEENT: Negative for nosebleeds, vision changes. Respiratory: Negative for cough, hemoptysis  Cardiovascular: + recent chest pain, + occasional palpitations, no orthopnea, claudication, leg swelling, syncope, and PND. + chronic SOB  Gastrointestinal: Negative for nausea, vomiting, diarrhea, blood in stool and melena. Genitourinary: Negative for dysuria, and hematuria. Musculoskeletal: Negative for myalgias, arthralgia. Skin: Negative for rash. Heme: Does not bleed or bruise easily.    Neurological: Negative for speech change and focal weakness     Objective:     Visit Vitals  /88 (BP 1 Location: Left arm, BP Patient Position: Sitting)   Pulse 72   Resp 16   Ht 5' 7\" (1.702 m)   Wt 206 lb (93.4 kg)   BMI 32.26 kg/m²        Physical Exam:   Constitutional: Well-developed and well-nourished. No respiratory distress. Head: Normocephalic and atraumatic. Eyes: Pupils are equal, round. ENT: Hearing grossly normal.  Neck: Supple. No JVD present. Cardiovascular: Normal rate, regular rhythm. Exam reveals no gallop and no friction rub. No murmur heard. Pulmonary/Chest: Effort normal and breath sounds normal. No wheezes. Abdominal: Soft, no tenderness. Musculoskeletal: Normal ROM & gait. Vasc/lymphatic: No edema. Neurological: Alert,oriented. Skin: Skin is warm and dry. Psychiatric: Normal mood and affect. Behavior is normal. Judgment and thought content normal.        Assessment/Plan:       ICD-10-CM ICD-9-CM    1. Chest pain, unspecified type  R07.9 786.50 ECHO ADULT COMPLETE      EXERCISE CARDIAC STRESS TEST      CANCELED: EXERCISE CARDIAC STRESS TEST   2. GÓMEZ (dyspnea on exertion)  R06.00 786.09    3. Hypertension, unspecified type  I10 401.9 ECHO ADULT COMPLETE      EXERCISE CARDIAC STRESS TEST      CANCELED: EXERCISE CARDIAC STRESS TEST       Ms. Harrison has had recurrent chest pain, shortness of breath prompting ER visits x 3 in the past 5 years. Evaluation in 2015 (echo, treadmill stress test, holter) by Dr. Mouna Martin essentially normal other than possible long RP tachycardia & frequent PACs noted on holter monitor.   Recent labs K 3.6 and mag 1.8  Abdominal and chest CT did not show tumor or PE 2014 and 2017 so I doubt she has secondary hypertension from hyperaldosteronism  She is recommended to repeat 2 D echo and treadmill test and COVID test needed for GXT   She should continue to lose weight  If BP is high at test day, I will recommend BP medication  She agrees to proceed    addendum  Stress test without imaging: NSR and good exercise capacity with appropriate sinus tachycardia, no other arrhythmia  Echo was normal  Blood pressure was high with exercise 210/90  I recommend toprol XL 25 mg qd      Thank you for involving me in this patient's care and please call with further concerns or questions. Christo Harkins M.D.   Electrophysiology/Cardiology  [facility]  [address] [place] [address]  [phone]

## 2020-08-27 ENCOUNTER — OFFICE VISIT (OUTPATIENT)
Dept: CARDIOLOGY CLINIC | Age: 30
End: 2020-08-27
Payer: COMMERCIAL

## 2020-08-27 VITALS
DIASTOLIC BLOOD PRESSURE: 88 MMHG | WEIGHT: 206 LBS | HEART RATE: 72 BPM | BODY MASS INDEX: 32.33 KG/M2 | HEIGHT: 67 IN | SYSTOLIC BLOOD PRESSURE: 146 MMHG | RESPIRATION RATE: 16 BRPM

## 2020-08-27 DIAGNOSIS — I10 HYPERTENSION, UNSPECIFIED TYPE: ICD-10-CM

## 2020-08-27 DIAGNOSIS — R06.09 DOE (DYSPNEA ON EXERTION): ICD-10-CM

## 2020-08-27 DIAGNOSIS — R07.9 CHEST PAIN, UNSPECIFIED TYPE: Primary | ICD-10-CM

## 2020-08-27 PROCEDURE — 99204 OFFICE O/P NEW MOD 45 MIN: CPT | Performed by: INTERNAL MEDICINE

## 2020-08-27 NOTE — PATIENT INSTRUCTIONS
You will be scheduled for an Echo after you appointment today. You will be scheduled for a Stress test after your appointment today. Please wear comfortable clothing (shorts or pants with a shirt or blouse) and walking/athletic shoes.  
 
Do not eat or drink anything, except water, for at least 2 hours prior to your test. 
 
Do take your scheduled medications prior to your test.

## 2020-09-02 RX ORDER — BUDESONIDE AND FORMOTEROL FUMARATE DIHYDRATE 160; 4.5 UG/1; UG/1
AEROSOL RESPIRATORY (INHALATION)
Qty: 1 INHALER | Refills: 5 | Status: SHIPPED | OUTPATIENT
Start: 2020-09-02 | End: 2022-10-05 | Stop reason: SDUPTHER

## 2020-09-02 RX ORDER — PRAMIPEXOLE DIHYDROCHLORIDE 0.5 MG/1
TABLET ORAL
Qty: 30 TAB | Refills: 2 | Status: SHIPPED | OUTPATIENT
Start: 2020-09-02 | End: 2021-02-03 | Stop reason: SDUPTHER

## 2020-09-10 ENCOUNTER — TELEPHONE (OUTPATIENT)
Dept: FAMILY MEDICINE CLINIC | Age: 30
End: 2020-09-10

## 2020-09-10 DIAGNOSIS — F90.2 ATTENTION DEFICIT HYPERACTIVITY DISORDER (ADHD), COMBINED TYPE: ICD-10-CM

## 2020-09-10 RX ORDER — DEXTROAMPHETAMINE SACCHARATE, AMPHETAMINE ASPARTATE, DEXTROAMPHETAMINE SULFATE AND AMPHETAMINE SULFATE 5; 5; 5; 5 MG/1; MG/1; MG/1; MG/1
20 TABLET ORAL 2 TIMES DAILY
Qty: 60 TAB | Refills: 0 | Status: SHIPPED | OUTPATIENT
Start: 2020-09-10 | End: 2020-10-14 | Stop reason: SDUPTHER

## 2020-09-10 NOTE — TELEPHONE ENCOUNTER
12/28/2017         RE: Annabella Bolanos  [address] [place] [address]        Dear Colleague,    Thank you for referring your patient, Annabella Bolanos, to the [facility]. Please see a copy of my visit note below.    Name: Annabella Bolanos  Seen at the request of Brook Echavarria for subclinical Hyperthyroidism.    HPI:  Annabella Bolanos is a 64 year old female who presents for the evaluation of subclinical Hyperthyroidism.  Noted since 7/2016. Plan was for continue to monitor.  Never been on medications.  No h/o arrhythmia  No h/o osteoporosis-- has osteopenia. Not on treatment.  TSI neg  US thyroid ( h/o radiation)- no discrete nodules.      H/o breast cancer and h/o aortic valve replacement and is on long term anticoagulation.  Breast cancer diagnosed in 2014 s/p lumpectomy and chemo and radiation. Took radiation 5 days a week for 1 month.    Feeling good.  Has good energy.  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  H/o arthritis.    Weight is stable.  Eating healthy.    History of radiation exposure: YES. As above  History of thyroid dysfunction: not to her knowledge. No FH of thyroid cancer.  Palpitations:  No  Changes to hair or skin: No  Diarrhea/Constipation:No  Changes in menses: s/p menopause  Changes in vision:No  Diplopia/Blurryness:No  Dysphagia or Shortness of breath:No  Tremors:No  Difficulty sleeping:Yes: most of the time  Changes in weight: No  Lithium/Amiodarone: No  URI: No  CT Scans:No  Mother had hyperthyroidism s/p DOE and was on levothyroxine.    PMH/PSH:  Past Medical History:   Diagnosis Date     Adenomatous colon polyp 8/2015     Aortic stenosis 6/23/11    bicuspid AV with severe stenosis - 6/2011 mechanical AVR with 23 mm St Yordan AV conduit, composite graft placement     Arthritis     knees and hands     Bicuspid aortic valve      Breast cancer (H) 7/2014    R breast     H/O aortic valve replacement     St Yordan     Heart murmur     Valve repalcement 2011.     History of blood  One month sent in. Needs new appt for 3 more mo of RX next month.  [facility] "transfusion     Unsure with Aortic valve replacement     HTN, goal below 140/90      Hx of repair of dissecting aneurysm of ascending thoracic aorta 11    AAA repair with av23 mm tube graft     PONV (postoperative nausea and vomiting)     n/v morphine vs anesthesia, vomiting x24 hrs     Subclinical hyperthyroidism 2017     Thrombosis of leg     after  of daughter     Uncomplicated asthma      Past Surgical History:   Procedure Laterality Date     BIOPSY NODE SENTINEL Right 9/10/2014    Procedure: BIOPSY NODE SENTINEL;  Surgeon: Ila Romano MD;  Location: [facility] OR     CARDIAC SURGERY  2011    aortic valve replacement     ENT SURGERY      tonsillectomy     HRW PORT A CATH       INSERT PORT VASCULAR ACCESS Left 10/22/2014    Procedure: INSERT PORT VASCULAR ACCESS;  Surgeon: Ila Romano MD;  Location: [facility] OR     LUMPECTOMY BREAST WITH SEED LOCALIZATION Right 9/10/2014    Procedure: LUMPECTOMY BREAST WITH SEED LOCALIZATION;  Surgeon: Ila Romano MD;  Location: [facility] OR     ORTHOPEDIC SURGERY      shoulder, thumb surgery     REMOVE PORT VASCULAR ACCESS Left 2015    Procedure: REMOVE PORT VASCULAR ACCESS;  Surgeon: Ila Romano MD;  Location: [facility] OR     REPAIR ANEURYSM ASCENDING AORTA  2011    Procedure:REPAIR ANEURYSM ASCENDING AORTA; Medial Sternotomy, Aortic Valve Replacement, ([employer]. Y[employer] HP Valved Graft, size 23 mm) on pump oxygenation, intraoperative transesophageal cardiogram; Surgeon:JOHN PAUL ULLOA; Location:[facility] OR     REPLACE VALVE AORTIC  11    bicuspid AV with severe stenosis - 2011 mechanical AVR with 23 mm St Yordan AV conduit, composite graft placement     Family Hx:  Family History   Problem Relation Age of Onset     Hypertension Mother      Thyroid Disease Mother      \"Toxic thyroid\"     Prostate Cancer Father 70     CANCER Father 80     Lung cancer, Not caused from smoking     CEREBROVASCULAR DISEASE Father 80     " "Hypertension Father      Cardiovascular Brother      Cardiovascular Son      Puentes-Parkinsons White Syndrome     Cardiovascular Daughter      Heart murmur     Breast Cancer Paternal Aunt 80     Cardiovascular Paternal Aunt      Arthritis Brother 40     RA     CANCER Maternal Grandfather      Colon Cancer No family hx of      Thyroid disease: as above          Social Hx:  Social History     Social History     Marital status: Single     Spouse name: N/A     Number of children: N/A     Years of education: N/A     Occupational History     Not on file.     Social History Main Topics     Smoking status: Never Smoker     Smokeless tobacco: Never Used     Alcohol use Yes      Comment: 2 drinks per week -      Drug use: No     Sexual activity: Not Currently     Other Topics Concern     Caffeine Concern Yes     1-2 cups caffeine per day     Sleep Concern No     Stress Concern No     Weight Concern No     Special Diet Yes     low fat daily , low red meats     Back Care No     Exercise Yes     walks daily/ 3x a week exercise class     Social History Narrative          MEDICATIONS:  has a current medication list which includes the following prescription(s): lisinopril, carvedilol, warfarin, zolpidem, cholecalciferol, aspirin, anastrozole, acetaminophen, calcium-magnesium-vitamin d, valacyclovir, triamcinolone acetonide, and albuterol.    ROS   ROS: 10 point ROS neg other than the symptoms noted above in the HPI.    Physical Exam   VS: /80 (BP Location: Right arm, Patient Position: Chair, Cuff Size: Adult Regular)  Pulse 75  Temp 97.9  F (36.6  C) (Oral)  Ht 1.626 m (5' 4\")  Wt 58.5 kg (129 lb)  SpO2 99%  BMI 22.14 kg/m2  GENERAL: AXOX3, NAD, well dressed, answering questions appropriately, appears stated age.  HEENT: No exopthalmous, no proptosis, EOMI, no lig lag, no retraction  NECK: Thyroid normal in size, non tender, no nodules were palpated.  CV: RRR  LUNGS: CTAB  ABDOMEN: +BS  NEUROLOGY: CN grossly intact, no " tremors  PSYCH: normal affect and mood      LABS:  TFTs:  ENDO THYROID LABS-[facility] Latest Ref Rng & Units 12/6/2017   TSH 0.40 - 4.00 mU/L 0.18 (L)   T4 FREE 0.76 - 1.46 ng/dL 1.16   FREE T3 2.3 - 4.2 pg/mL 3.2   THYROID STIM IMMUNOG <=1.3 TSI index <1.0     ENDO THYROID LABS-[facility] Latest Ref Rng & Units 11/10/2017 7/24/2017   TSH 0.40 - 4.00 mU/L 0.12 (L) 0.12 (L)   T4 FREE 0.76 - 1.46 ng/dL 1.10 1.16     ENDO THYROID LABS-[facility] Latest Ref Rng & Units 7/14/2016   TSH 0.40 - 4.00 mU/L 0.24 (L)   T4 FREE 0.76 - 1.46 ng/dL 1.22       CBC:  Lab Results   Component Value Date    WBC 5.0 07/24/2017     Lab Results   Component Value Date    RBC 4.30 07/24/2017     Lab Results   Component Value Date    HGB 13.3 07/24/2017     Lab Results   Component Value Date    HCT 40.7 07/24/2017     No components found for: MCT  Lab Results   Component Value Date    MCV 95 07/24/2017     Lab Results   Component Value Date    MCH 30.9 07/24/2017     Lab Results   Component Value Date    MCHC 32.7 07/24/2017     Lab Results   Component Value Date    RDW 11.3 07/24/2017     Lab Results   Component Value Date     07/24/2017         LFTs:  Liver Function Studies -   Recent Labs   Lab Test  07/24/17   0812   PROTTOTAL  7.1   ALBUMIN  3.6   BILITOTAL  0.6   ALKPHOS  86   AST  19   ALT  23     ULTRASOUND THYROID December 15, 2017 9:24 AM   HISTORY: Subclinical hyperthyroidism.   FINDINGS: Thyroid ultrasound demonstrates an enlarged thyroid gland. The right lobe measures 5.4 x 2.0 x 1.8 cm. The left lobe measures 3.8 x 1.6 x 1.3 cm. The isthmus is normal in thickness. Thyroid parenchyma is heterogeneous in echotexture. Increased color Doppler flow is noted throughout the thyroid gland. Thyroid nodules as follows: Right Lobe: None. Isthmus: None. Left Lobe: None.   IMPRESSION: Enlarged heterogeneous thyroid gland without evidence of a discrete thyroid nodule. Increased color Doppler flow suggests underlying thyroiditis. Correlate with thyroid  function test and nuclear medicine thyroid scan as needed. MALLORIE VALLADARES MD      All pertinent notes, labs, and images personally reviewed by me.     A/P  Ms.Carol WINDY Bolanos is a 64 year old here for the evaluation of hyperthyroidism.    Subclinical hyperthyroidism:   TSH remained suppressed since 2016 and along with normal free T4  No h/o arrhythmia  No h/o osteoporosis-- has osteopenia. Not on treatment.  TSI neg  US thyroid ( h/o radiation)- no discrete nodules.  Clinically looks euthyroid  No major complaints  Clinically looks euthyroid  I discussed subclinical hyperthyroidism in general with pt.  Plan to continue to monitor as there are no major risk factors and clinically looks euthyroid  Discussed s/s of hyperthyroidism to watch for and gets labs sooner in that case.  She will have her DEXA scan done with oncology and she will get the records in next visit.    Follow-up:  6 months    Merle Baxter MD  Endocrinology  [facility]  CC: Brook Echavarria     More than 50% of face to face time spent with Ms. Bolanos on counseling / coordinating her care.    All questions were answered.  The patient indicates understanding of the above issues and agrees with the plan set forth.           Again, thank you for allowing me to participate in the care of your patient.        Sincerely,        Merle Baxter MD

## 2020-09-15 ENCOUNTER — HOSPITAL ENCOUNTER (OUTPATIENT)
Dept: PREADMISSION TESTING | Age: 30
Discharge: HOME | End: 2020-09-15
Payer: COMMERCIAL

## 2020-09-15 DIAGNOSIS — Z01.812 PRE-PROCEDURE LAB EXAM: ICD-10-CM

## 2020-09-15 PROCEDURE — 87635 SARS-COV-2 COVID-19 AMP PRB: CPT

## 2020-09-16 LAB — SARS-COV-2, COV2NT: NOT DETECTED

## 2020-09-18 ENCOUNTER — ANCILLARY PROCEDURE (OUTPATIENT)
Dept: CARDIOLOGY CLINIC | Age: 30
End: 2020-09-18
Payer: COMMERCIAL

## 2020-09-18 VITALS
WEIGHT: 206 LBS | DIASTOLIC BLOOD PRESSURE: 94 MMHG | BODY MASS INDEX: 32.33 KG/M2 | HEIGHT: 67 IN | SYSTOLIC BLOOD PRESSURE: 134 MMHG

## 2020-09-18 DIAGNOSIS — R07.9 CHEST PAIN, UNSPECIFIED TYPE: ICD-10-CM

## 2020-09-18 DIAGNOSIS — I10 HYPERTENSION, UNSPECIFIED TYPE: ICD-10-CM

## 2020-09-18 LAB
ECHO AO ASC DIAM: 2.55 CM
ECHO AO ROOT DIAM: 2.93 CM
ECHO AV AREA PEAK VELOCITY: 2.31 CM2
ECHO AV AREA VTI: 2.77 CM2
ECHO AV AREA/BSA PEAK VELOCITY: 1.1 CM2/M2
ECHO AV AREA/BSA VTI: 1.4 CM2/M2
ECHO AV MEAN GRADIENT: 3.34 MMHG
ECHO AV PEAK GRADIENT: 6.1 MMHG
ECHO AV PEAK VELOCITY: 123.47 CM/S
ECHO AV VTI: 20.22 CM
ECHO IVC PROX: 2.2 CM
ECHO LA AREA 4C: 17.37 CM2
ECHO LA MAJOR AXIS: 3.81 CM
ECHO LA MINOR AXIS: 1.86 CM
ECHO LA VOL 2C: 40.51 ML (ref 22–52)
ECHO LA VOL 4C: 47.53 ML (ref 22–52)
ECHO LA VOL BP: 47.57 ML (ref 22–52)
ECHO LA VOL/BSA BIPLANE: 23.23 ML/M2 (ref 16–28)
ECHO LA VOLUME INDEX A2C: 19.78 ML/M2 (ref 16–28)
ECHO LA VOLUME INDEX A4C: 23.21 ML/M2 (ref 16–28)
ECHO LV E' LATERAL VELOCITY: 15.74 CM/S
ECHO LV E' SEPTAL VELOCITY: 11.36 CM/S
ECHO LV EDV A2C: 86.19 ML
ECHO LV EDV A4C: 75.54 ML
ECHO LV EDV BP: 80.56 ML (ref 56–104)
ECHO LV EDV INDEX A4C: 36.9 ML/M2
ECHO LV EDV INDEX BP: 39.3 ML/M2
ECHO LV EDV NDEX A2C: 42.1 ML/M2
ECHO LV EJECTION FRACTION A2C: 62 PERCENT
ECHO LV EJECTION FRACTION A4C: 66 PERCENT
ECHO LV EJECTION FRACTION BIPLANE: 62.1 PERCENT (ref 55–100)
ECHO LV ESV A2C: 32.79 ML
ECHO LV ESV A4C: 25.49 ML
ECHO LV ESV BP: 30.52 ML (ref 19–49)
ECHO LV ESV INDEX A2C: 16 ML/M2
ECHO LV ESV INDEX A4C: 12.4 ML/M2
ECHO LV ESV INDEX BP: 14.9 ML/M2
ECHO LV INTERNAL DIMENSION DIASTOLIC: 4.46 CM (ref 3.9–5.3)
ECHO LV INTERNAL DIMENSION SYSTOLIC: 3.02 CM
ECHO LV IVSD: 0.86 CM (ref 0.6–0.9)
ECHO LV MASS 2D: 137.8 G (ref 67–162)
ECHO LV MASS INDEX 2D: 67.3 G/M2 (ref 43–95)
ECHO LV POSTERIOR WALL DIASTOLIC: 1.01 CM (ref 0.6–0.9)
ECHO LVOT DIAM: 2.12 CM
ECHO LVOT PEAK GRADIENT: 2.64 MMHG
ECHO LVOT PEAK VELOCITY: 81.17 CM/S
ECHO LVOT SV: 56 ML
ECHO LVOT VTI: 15.96 CM
ECHO MV A VELOCITY: 54.63 CM/S
ECHO MV E DECELERATION TIME (DT): 0.14 S
ECHO MV E VELOCITY: 81.7 CM/S
ECHO MV E/A RATIO: 1.5
ECHO MV E/E' LATERAL: 5.19
ECHO MV E/E' RATIO (AVERAGED): 6.19
ECHO MV E/E' SEPTAL: 7.19
ECHO PV MAX VELOCITY: 89.58 CM/S
ECHO PV PEAK INSTANTANEOUS GRADIENT SYSTOLIC: 3.21 MMHG
ECHO RV TAPSE: 2.17 CM (ref 1.5–2)
LA VOL DISK BP: 44.76 ML (ref 22–52)

## 2020-09-18 PROCEDURE — 93306 TTE W/DOPPLER COMPLETE: CPT | Performed by: INTERNAL MEDICINE

## 2020-09-18 PROCEDURE — 93015 CV STRESS TEST SUPVJ I&R: CPT | Performed by: INTERNAL MEDICINE

## 2020-09-21 ENCOUNTER — TELEPHONE (OUTPATIENT)
Dept: CARDIOLOGY CLINIC | Age: 30
End: 2020-09-21

## 2020-09-21 LAB
STRESS ANGINA INDEX: 0
STRESS BASELINE DIAS BP: 94 MMHG
STRESS BASELINE HR: 81 BPM
STRESS BASELINE SYS BP: 134 MMHG
STRESS ESTIMATED WORKLOAD: 13.4 METS
STRESS EXERCISE DUR MIN: NORMAL MIN:SEC
STRESS O2 SAT PEAK: 98 %
STRESS PEAK DIAS BP: 94 MMHG
STRESS PEAK SYS BP: 210 MMHG
STRESS PERCENT HR ACHIEVED: 92 %
STRESS POST PEAK HR: 176 BPM
STRESS RATE PRESSURE PRODUCT: NORMAL BPM*MMHG
STRESS SR DUKE TREADMILL SCORE: 11
STRESS ST DEPRESSION: 0 MM
STRESS ST ELEVATION: 0 MM
STRESS TARGET HR: 192 BPM

## 2020-09-21 NOTE — TELEPHONE ENCOUNTER
----- Message from Agata Fan MD sent at 9/21/2020  7:44 AM EDT -----  Stress test without imaging: NSR and good exercise capacity with appropriate sinus tachycardia, no other arrhythmia  Echo was normal  Blood pressure was high with exercise 210/90  I recommend toprol XL 25 mg every day for both BP and hx of PSVT  Follow up 3-6 months if she needs

## 2020-09-21 NOTE — PROGRESS NOTES
Stress test without imaging: NSR and good exercise capacity with appropriate sinus tachycardia, no other arrhythmia  Echo was normal  Blood pressure was high with exercise 210/90  I recommend toprol XL 25 mg every day for both BP and hx of PSVT  Follow up 3-6 months if she needs

## 2020-09-22 NOTE — TELEPHONE ENCOUNTER
Called pt two patient identifiers confirmed. Pt does not want to start metoprolol she was looking up the side effects while on the phone. Pt stated that she would like to know if Dr. Priscilla White would Prescribe her Spirolactone instead. Pt stated that she found online that it helps with BP and acne. Notified pt it will not help her with the fast HR though but I can ask Dr. Priscilla White. Pt stated that her PCP has not been worried about her BP when she asks about it and would like to know why Dr. Priscilla White is prescribing it now. Notified pt BP  Reached 210/90 while being stressed and her fast heart rate. Pt stated that she would like to know what other medications Dr. Priscilla White would recommend and go over that at follow up on 10/6/2020.  Will notify MD/NP

## 2020-09-23 ENCOUNTER — DOCUMENTATION ONLY (OUTPATIENT)
Dept: CARDIOLOGY CLINIC | Age: 30
End: 2020-09-23

## 2020-09-23 RX ORDER — METOPROLOL SUCCINATE 25 MG/1
25 TABLET, EXTENDED RELEASE ORAL DAILY
Qty: 30 TAB | Refills: 1 | Status: SHIPPED | OUTPATIENT
Start: 2020-09-23 | End: 2020-11-23

## 2020-09-23 NOTE — PROGRESS NOTES
If she wants aldactone 25 mg every day she needs to have at least BMP check once a year  Her BMP was ok in August 2020

## 2020-09-23 NOTE — TELEPHONE ENCOUNTER
Attempted to reach patient by telephone. A message was left for return call.      Yun Yunhart message sent as well with MD recommendations

## 2020-09-23 NOTE — TELEPHONE ENCOUNTER
She may take aldactone but it is not primary high BP medication and it is not going to control arrhythmia

## 2020-10-14 ENCOUNTER — VIRTUAL VISIT (OUTPATIENT)
Dept: FAMILY MEDICINE CLINIC | Age: 30
End: 2020-10-14
Payer: COMMERCIAL

## 2020-10-14 DIAGNOSIS — R06.02 SHORTNESS OF BREATH: ICD-10-CM

## 2020-10-14 DIAGNOSIS — R05.9 COUGH: Primary | ICD-10-CM

## 2020-10-14 DIAGNOSIS — F90.2 ATTENTION DEFICIT HYPERACTIVITY DISORDER (ADHD), COMBINED TYPE: ICD-10-CM

## 2020-10-14 PROCEDURE — 99213 OFFICE O/P EST LOW 20 MIN: CPT | Performed by: NURSE PRACTITIONER

## 2020-10-14 RX ORDER — DEXTROAMPHETAMINE SACCHARATE, AMPHETAMINE ASPARTATE, DEXTROAMPHETAMINE SULFATE AND AMPHETAMINE SULFATE 5; 5; 5; 5 MG/1; MG/1; MG/1; MG/1
20 TABLET ORAL 2 TIMES DAILY
Qty: 60 TAB | Refills: 0 | Status: SHIPPED | OUTPATIENT
Start: 2020-12-14 | End: 2021-02-03 | Stop reason: SDUPTHER

## 2020-10-14 RX ORDER — DEXTROAMPHETAMINE SACCHARATE, AMPHETAMINE ASPARTATE, DEXTROAMPHETAMINE SULFATE AND AMPHETAMINE SULFATE 5; 5; 5; 5 MG/1; MG/1; MG/1; MG/1
20 TABLET ORAL 2 TIMES DAILY
Qty: 60 TAB | Refills: 0 | Status: SHIPPED | OUTPATIENT
Start: 2020-11-14 | End: 2020-11-03

## 2020-10-14 RX ORDER — DEXTROAMPHETAMINE SACCHARATE, AMPHETAMINE ASPARTATE, DEXTROAMPHETAMINE SULFATE AND AMPHETAMINE SULFATE 5; 5; 5; 5 MG/1; MG/1; MG/1; MG/1
20 TABLET ORAL 2 TIMES DAILY
Qty: 60 TAB | Refills: 0 | Status: SHIPPED | OUTPATIENT
Start: 2020-10-14 | End: 2020-11-03

## 2020-10-14 NOTE — LETTER
10/14/2020 Ms. [address] [facility] [address] Patient has appointment with this office on 10/15/20 for COVID testing. She is to quarantine until results are available.  
 
 
 
Sincerely, 
 
 
Narendra Romeo NP 
 
 INFECTIOUS DISEASES PROGRESS NOTE    Patient:  Abundio Dean  YOB: 1951  MRN: [id number]   Admit date: 2/22/2018   Admitting Physician: Vu Redd MD  Primary Care Physician: Elizabeth El DO    CHIEF COMPLAINT: infected RUE av graft      Interval History:  Moved to progressive care unit February 25 evening with atrial fibrillation. He was placed on a Cardizem drip and converted February 26. He reports that there were some concerns about multiple back and some other drugs that might interfere with his antirejection drugs however he states \"we worked that [employer]    Currently he is without any chest pain, shortness of breath reports she is tolerating the antibiotic well without diarrhea or rash. Allergies: No Known Allergies    Current Meds:     apixaban (ELIQUIS) tablet 5 mg BID   insulin lispro (HUMALOG) injection vial 0-35 Units 4x Daily AC & HS   ceFAZolin (ANCEF) 1 g in sterile water 10 mL IV syringe Q8H   pantoprazole (PROTONIX) tablet 40 mg QAM AC   magnesium oxide (MAG-OX) tablet 400 mg Daily   mycophenolate (CELLCEPT) capsule 500 mg BID   tacrolimus (proGRAF) capsule 0.5 mg BID   acetaminophen (TYLENOL) tablet 1,000 mg Daily   acetaminophen (TYLENOL) tablet 650 mg Q4H PRN   HYDROcodone-acetaminophen (NORCO) 5-325 MG per tablet 1 tablet Q4H PRN   Or    HYDROcodone-acetaminophen (NORCO) 5-325 MG per tablet 2 tablet Q4H PRN   0.9 % sodium chloride infusion Continuous   propranolol (INDERAL) tablet 40 mg BID   tamsulosin (FLOMAX) capsule 0.4 mg Nightly   zolpidem (AMBIEN) tablet 10 mg Nightly PRN   glucose (GLUTOSE) 40 % oral gel 15 g PRN   dextrose 50 % solution 12.5 g PRN   glucagon (rDNA) injection 1 mg PRN   dextrose 5 % solution PRN   ondansetron (ZOFRAN) injection 4 mg Q6H PRN   hydrALAZINE (APRESOLINE) injection 10 mg Q6H PRN       ROS   Gen.  no fever or chills  Cutaneous: No itching or rash  GI: No diarrhea        Vital Signs:  BP (!) 156/74   Pulse 63   Temp 97.5 °F (36.4 Organism Staphylococcus aureus (A)    Culture Surgical --    Moderate growth   No further workup   Refer to Arm Culture #1 Rt Graft Tract, collected 2/23/18 at 14:37 for   sensitivity results. Narrative:     ORDER#: [id number]                          ORDERED BY: CORTES LOUISE  SOURCE: Arm hardwar#3(r)arm fistulagraft   COLLECTED:  02/23/18 14:31  ANTIBIOTICS AT [facility]. :                      RECEIVED :  02/23/18 16:43  in thio   Surgical Culture [[id number]] (Abnormal) Collected: 02/23/18 1426   Order Status: Completed Specimen: Swab from Arm Updated: 02/25/18 0706    Gram Stain Result Moderate WBC's (Polymorphonuclear) present   Moderate Gram positive cocci  in pairs/clusters    (A)    Anaerobic Culture No anaerobes to date,will hold 4 days    Organism Staphylococcus aureus (A)    Culture Surgical --    Moderate growth   No further workup   Refer to Arm Culture, #1 Rt Graft Tract, collected on 2/23/18 at 14:37   for sensitivity results. Narrative:     ORDER#: [id number]                          ORDERED BY: CORTES LOUISE  SOURCE: Arm Swab#2(R) graft trac           COLLECTED:  02/23/18 14:26  ANTIBIOTICS AT [facility]. :                      RECEIVED :  02/23/18 16:06   Surgical Culture [[id number]] (Abnormal)  Collected: 02/23/18 1437   Order Status: Completed Specimen: Hardware from Arm Updated: 02/25/18 0705    Gram Stain Result Moderate WBC's (Polymorphonuclear) present   Moderate Gram positive cocci  in pairs    (A)    Anaerobic Culture No anaerobes to date,will hold 4 days    Organism Staphylococcus aureus (A)    Culture Surgical Moderate growth   Narrative:     ORDER#: [id number]                          ORDERED BY: CORTES LOUISE  SOURCE: Arm #1(r) graft tract              COLLECTED:  02/23/18 14:37  ANTIBIOTICS AT [facility]. :                      RECEIVED :  02/23/18 15:59       Culture & Susceptibility     STAPHYLOCOCCUS AUREUS     Antibiotic Interpretation [facility] Unit   azithromycin Sensitive  mcg/mL benzylpenicillin Sensitive 0.06 mcg/mL   clindamycin Sensitive <=0.25 mcg/mL   doxycycline Sensitive  mcg/mL   erythromycin Sensitive <=0.25 mcg/mL   inducible clindamycin resistance Negative Neg mcg/mL   oxacillin Sensitive <=0.25 mcg/mL   tetracycline Sensitive <=1 mcg/mL   trimethoprim-sulfamethoxazole Sensitive <=10 mcg/mL   vancomycin Sensitive <=0.5 mcg                  RADIOLOGY     CT UPPER EXTREMITY RIGHT WO CONTRAST [[id number]] Resulted: 02/22/18 2005     Order Status: Completed Updated: 02/22/18 1907     Narrative:       CT UPPER EXTREMITY RIGHT WO CONTRAST 2/22/2018 5:15 PM  HISTORY: Possible abscess  COMPARISON: None. TECHNIQUE:  Radiation dose equals  mGy cm. AUTOMATED EXPOSURE  CONTROL dose reduction technique was implemented. Thin section axial images were obtained. 2-D sagittal and coronal  reconstruction images were generated. FINDINGS:   There is a dialysis graft/fistula appreciated in the forearm. There is diffuse subcutaneous edema changes and skin thickening along  the anterior soft tissues of the of the forearm slightly more  conspicuous, focally along the distal graft. The induration does  extend diffusely particularly on the medial aspect of the forearm from  the elbow to the wrist region. These findings are compatible with  cellulitis. There is edema tracking along the superficial aspect of the forearm  musculature anteriorly and mild myositis changes would be difficult to  exclude. There is no discrete focal fluid collection indicate definite abscess. There is no abnormal soft tissue gas. There are no bony abnormalities indicate osteomyelitis.     Impression:       1. CT findings compatible with diffuse cellulitis involving the  forearm, anterior superficial soft tissues. No discrete fluid  collection to indicate a focal abscess.   Signed by Dr Holly Burch on 2/22/2018 7:05 PM           Patient Active Problem List   Diagnosis    Hypertension    Chest pressure   

## 2020-10-14 NOTE — PROGRESS NOTES
Jossie Zaragoza is a 31 y.o. female who was seen by synchronous (real-time) audio-video technology on 10/14/2020 for No chief complaint on file. I spent at least 15 minutes on this visit with this established patient. 712  Subjective:   Patient returns to office for follow up ADD. Stable on medication without weight loss, decreased appetite, insomnia, or tremor. Good focus control. Gets three months of scripts at a time. See med order for dose. She is having cough, sob, chills, and sweats  Does work in [employer] office with risk of exposure  Sx started yesterday    Prior to Admission medications    Medication Sig Start Date End Date Taking? Authorizing Provider   dextroamphetamine-amphetamine (ADDERALL) 20 mg tablet Take 1 Tab by mouth two (2) times a day. - must last 30 days 12/14/20  Yes Casimiro El, NP   dextroamphetamine-amphetamine (ADDERALL) 20 mg tablet Take 1 Tab by mouth two (2) times a day. - must last 30 days 11/14/20  Yes Casimiro El, NP   dextroamphetamine-amphetamine (ADDERALL) 20 mg tablet Take 1 Tab by mouth two (2) times a day. - must last 30 days 10/14/20  Yes Casimiro El, NP   metoprolol succinate (TOPROL-XL) 25 mg XL tablet Take 1 Tab by mouth daily. 9/23/20   Jessica Bolanos MD   dextroamphetamine-amphetamine (ADDERALL) 20 mg tablet Take 1 Tab by mouth two (2) times a day. - must last 30 days 9/10/20 10/14/20  Casimiro El, NP   dextroamphetamine-amphetamine (ADDERALL) 20 mg tablet Take 1 Tab by mouth two (2) times a day. - must last 30 days 9/10/20 10/14/20  Casimiro El, NP   pramipexole (MIRAPEX) 0.5 mg tablet TAKE 1 TABLET BY MOUTH EVERY NIGHT AT 7 PM 9/2/20   Casimiro El, NP   budesonide-formoteroL (SYMBICORT) 160-4.5 mcg/actuation HFAA INHALE 2 PUFFS BY MOUTH TWICE DAILY 9/2/20   Casimiro El, NP   dextroamphetamine-amphetamine (ADDERALL) 20 mg tablet Take 1 Tab by mouth two (2) times a day.  - must last 30 days 7/6/20 10/14/20  Edi Walsh, Guillermo Hernandez NP     Patient Active Problem List    Diagnosis Date Noted    Obesity, morbid ([address] 12/06/2017    Chest pain 11/25/2015    Other bipolar disorders 01/30/2014    Somatization disorder 01/30/2014    Anxiety 01/30/2014    Attention deficit hyperactivity disorder (ADHD), combined type 01/30/2014    Asthma 10/29/2013    Pseudotumor cerebri 08/09/2012    Headache(784.0) 08/03/2011    Anxiety associated with depression 03/25/2011       ROS    Objective:   No flowsheet data found. General: alert, cooperative, no distress   Mental  status: normal mood, behavior, speech, dress, motor activity, and thought processes, able to follow commands   HENT: NCAT   Neck: no visualized mass   Resp: no respiratory distress   Neuro: no gross deficits   Skin: no discoloration or lesions of concern on visible areas   Psychiatric: normal affect, consistent with stated mood, no evidence of hallucinations     Additional exam findings: none    Diagnoses and all orders for this visit:    1. Cough  -     NOVEL CORONAVIRUS (COVID-19); Future    2. Shortness of breath  -     NOVEL CORONAVIRUS (COVID-19); Future    3. Attention deficit hyperactivity disorder (ADHD), combined type  -     dextroamphetamine-amphetamine (ADDERALL) 20 mg tablet; Take 1 Tab by mouth two (2) times a day. - must last 30 days  -     dextroamphetamine-amphetamine (ADDERALL) 20 mg tablet; Take 1 Tab by mouth two (2) times a day. - must last 30 days  -     dextroamphetamine-amphetamine (ADDERALL) 20 mg tablet; Take 1 Tab by mouth two (2) times a day. - must last 30 days      Refills updated  Will come to the office tomorrow for COVID test  Letter done for her employer - quarantine guidelines reviewed    I have discussed the diagnosis with the patient and the intended plan as seen in the above orders. The patient has received an after-visit summary and questions were answered concerning future plans.  Patient conveyed understanding of the plan at the time of the visit. Ezekiel Souza, MSN, ANP  10/14/2020          We discussed the expected course, resolution and complications of the diagnosis(es) in detail. Medication risks, benefits, costs, interactions, and alternatives were discussed as indicated. I advised her to contact the office if her condition worsens, changes or fails to improve as anticipated. She expressed understanding with the diagnosis(es) and plan. Olu Nicole, who was evaluated through a patient-initiated, synchronous (real-time) audio-video encounter, and/or her healthcare decision maker, is aware that it is a billable service, with coverage as determined by her insurance carrier. She provided verbal consent to proceed: Yes, and patient identification was verified. It was conducted pursuant to the emergency declaration under the [facility] [address] authority and the [address] and [employer] Act. A caregiver was present when appropriate. Ability to conduct physical exam was limited. I was at home. The patient was at home.       Ezekiel Souza, ANTOINE

## 2020-10-15 ENCOUNTER — HOSPITAL ENCOUNTER (OUTPATIENT)
Dept: LAB | Age: 30
Discharge: HOME | End: 2020-10-15

## 2020-10-19 LAB — SARS-COV-2, COV2NT: NOT DETECTED

## 2020-11-02 RX ORDER — CIPROFLOXACIN 500 MG/1
500 TABLET ORAL 2 TIMES DAILY
Qty: 10 TAB | Refills: 0 | Status: SHIPPED | OUTPATIENT
Start: 2020-11-02 | End: 2020-11-03

## 2020-11-03 ENCOUNTER — HOSPITAL ENCOUNTER (EMERGENCY)
Age: 30
Discharge: HOME | End: 2020-11-03
Attending: EMERGENCY MEDICINE
Payer: COMMERCIAL

## 2020-11-03 VITALS
HEIGHT: 67 IN | RESPIRATION RATE: 16 BRPM | SYSTOLIC BLOOD PRESSURE: 134 MMHG | WEIGHT: 201.06 LBS | TEMPERATURE: 98 F | OXYGEN SATURATION: 99 % | DIASTOLIC BLOOD PRESSURE: 80 MMHG | HEART RATE: 67 BPM | BODY MASS INDEX: 31.56 KG/M2

## 2020-11-03 DIAGNOSIS — B37.31 YEAST VAGINITIS: ICD-10-CM

## 2020-11-03 DIAGNOSIS — B96.89 BV (BACTERIAL VAGINOSIS): ICD-10-CM

## 2020-11-03 DIAGNOSIS — N30.00 ACUTE CYSTITIS WITHOUT HEMATURIA: Primary | ICD-10-CM

## 2020-11-03 DIAGNOSIS — N76.0 BV (BACTERIAL VAGINOSIS): ICD-10-CM

## 2020-11-03 LAB
ALBUMIN SERPL-MCNC: 3.7 G/DL (ref 3.5–5)
ALBUMIN/GLOB SERPL: 1.1 {RATIO} (ref 1.1–2.2)
ALP SERPL-CCNC: 51 U/L (ref 45–117)
ALT SERPL-CCNC: 16 U/L (ref 12–78)
ANION GAP SERPL CALC-SCNC: 7 MMOL/L (ref 5–15)
APPEARANCE UR: CLEAR
AST SERPL-CCNC: 10 U/L (ref 15–37)
BACTERIA URNS QL MICRO: ABNORMAL /HPF
BASOPHILS # BLD: 0 K/UL (ref 0–0.1)
BASOPHILS NFR BLD: 0 % (ref 0–1)
BILIRUB SERPL-MCNC: 0.5 MG/DL (ref 0.2–1)
BILIRUB UR QL: NEGATIVE
BUN SERPL-MCNC: 14 MG/DL (ref 6–20)
BUN/CREAT SERPL: 18 (ref 12–20)
CALCIUM SERPL-MCNC: 8.9 MG/DL (ref 8.5–10.1)
CHLORIDE SERPL-SCNC: 105 MMOL/L (ref 97–108)
CLUE CELLS VAG QL WET PREP: NORMAL
CO2 SERPL-SCNC: 27 MMOL/L (ref 21–32)
COLOR UR: ABNORMAL
CREAT SERPL-MCNC: 0.79 MG/DL (ref 0.55–1.02)
DIFFERENTIAL METHOD BLD: ABNORMAL
EOSINOPHIL # BLD: 0.1 K/UL (ref 0–0.4)
EOSINOPHIL NFR BLD: 1 % (ref 0–7)
EPITH CASTS URNS QL MICRO: ABNORMAL /LPF
ERYTHROCYTE [DISTWIDTH] IN BLOOD BY AUTOMATED COUNT: 12.2 % (ref 11.5–14.5)
GLOBULIN SER CALC-MCNC: 3.5 G/DL (ref 2–4)
GLUCOSE SERPL-MCNC: 110 MG/DL (ref 65–100)
GLUCOSE UR STRIP.AUTO-MCNC: NEGATIVE MG/DL
HCG UR QL: NEGATIVE
HCT VFR BLD AUTO: 41.6 % (ref 35–47)
HGB BLD-MCNC: 14.3 G/DL (ref 11.5–16)
HGB UR QL STRIP: ABNORMAL
IMM GRANULOCYTES # BLD AUTO: 0 K/UL (ref 0–0.04)
IMM GRANULOCYTES NFR BLD AUTO: 0 % (ref 0–0.5)
KETONES UR QL STRIP.AUTO: NEGATIVE MG/DL
KOH PREP SPEC: NORMAL
LEUKOCYTE ESTERASE UR QL STRIP.AUTO: ABNORMAL
LYMPHOCYTES # BLD: 1.9 K/UL (ref 0.8–3.5)
LYMPHOCYTES NFR BLD: 17 % (ref 12–49)
MCH RBC QN AUTO: 33.6 PG (ref 26–34)
MCHC RBC AUTO-ENTMCNC: 34.4 G/DL (ref 30–36.5)
MCV RBC AUTO: 97.7 FL (ref 80–99)
MONOCYTES # BLD: 0.7 K/UL (ref 0–1)
MONOCYTES NFR BLD: 6 % (ref 5–13)
MUCOUS THREADS URNS QL MICRO: ABNORMAL /LPF
NEUTS SEG # BLD: 8.2 K/UL (ref 1.8–8)
NEUTS SEG NFR BLD: 75 % (ref 32–75)
NITRITE UR QL STRIP.AUTO: NEGATIVE
NRBC # BLD: 0 K/UL (ref 0–0.01)
NRBC BLD-RTO: 0 PER 100 WBC
PH UR STRIP: 5 [PH] (ref 5–8)
PLATELET # BLD AUTO: 221 K/UL (ref 150–400)
PMV BLD AUTO: 9.7 FL (ref 8.9–12.9)
POTASSIUM SERPL-SCNC: 3.6 MMOL/L (ref 3.5–5.1)
PROT SERPL-MCNC: 7.2 G/DL (ref 6.4–8.2)
PROT UR STRIP-MCNC: NEGATIVE MG/DL
RBC # BLD AUTO: 4.26 M/UL (ref 3.8–5.2)
RBC #/AREA URNS HPF: ABNORMAL /HPF (ref 0–5)
SERVICE CMNT-IMP: NORMAL
SODIUM SERPL-SCNC: 139 MMOL/L (ref 136–145)
SP GR UR REFRACTOMETRY: 1.02 (ref 1–1.03)
T VAGINALIS VAG QL WET PREP: NORMAL
UA: UC IF INDICATED,UAUC: ABNORMAL
UROBILINOGEN UR QL STRIP.AUTO: 0.2 EU/DL (ref 0.2–1)
WBC # BLD AUTO: 10.8 K/UL (ref 3.6–11)
WBC URNS QL MICRO: >100 /HPF (ref 0–4)

## 2020-11-03 PROCEDURE — 36415 COLL VENOUS BLD VENIPUNCTURE: CPT

## 2020-11-03 PROCEDURE — 81001 URINALYSIS AUTO W/SCOPE: CPT

## 2020-11-03 PROCEDURE — 87210 SMEAR WET MOUNT SALINE/INK: CPT

## 2020-11-03 PROCEDURE — 80053 COMPREHEN METABOLIC PANEL: CPT

## 2020-11-03 PROCEDURE — 85025 COMPLETE CBC W/AUTO DIFF WBC: CPT

## 2020-11-03 PROCEDURE — 87086 URINE CULTURE/COLONY COUNT: CPT

## 2020-11-03 PROCEDURE — 81025 URINE PREGNANCY TEST: CPT

## 2020-11-03 PROCEDURE — 87491 CHLMYD TRACH DNA AMP PROBE: CPT

## 2020-11-03 PROCEDURE — 99284 EMERGENCY DEPT VISIT MOD MDM: CPT

## 2020-11-03 RX ORDER — FLUCONAZOLE 100 MG/1
100 TABLET ORAL DAILY
Qty: 5 TAB | Refills: 0 | Status: SHIPPED | OUTPATIENT
Start: 2020-11-03 | End: 2020-11-03

## 2020-11-03 RX ORDER — CIPROFLOXACIN 500 MG/1
500 TABLET ORAL 2 TIMES DAILY
Qty: 14 TAB | Refills: 0 | Status: SHIPPED | OUTPATIENT
Start: 2020-11-03 | End: 2020-11-03

## 2020-11-03 RX ORDER — METRONIDAZOLE 500 MG/1
500 TABLET ORAL 2 TIMES DAILY
Qty: 14 TAB | Refills: 0 | Status: SHIPPED | OUTPATIENT
Start: 2020-11-03 | End: 2020-11-10

## 2020-11-03 RX ORDER — FLUCONAZOLE 100 MG/1
100 TABLET ORAL DAILY
Qty: 5 TAB | Refills: 0 | Status: SHIPPED | OUTPATIENT
Start: 2020-11-03 | End: 2020-11-10

## 2020-11-03 RX ORDER — METRONIDAZOLE 500 MG/1
500 TABLET ORAL 2 TIMES DAILY
Qty: 14 TAB | Refills: 0 | Status: SHIPPED | OUTPATIENT
Start: 2020-11-03 | End: 2020-11-03

## 2020-11-03 RX ORDER — CIPROFLOXACIN 500 MG/1
500 TABLET ORAL 2 TIMES DAILY
Qty: 14 TAB | Refills: 0 | Status: SHIPPED | OUTPATIENT
Start: 2020-11-03 | End: 2020-11-10

## 2020-11-03 NOTE — ED PROVIDER NOTES
HPI   The patient is a 82-year-old white female who presents to the emergency room with acute onset of UTI symptoms of frequency and burning with urination. Also bilateral low back pain not exactly flank pain which she rates at 5 out of 10. She is also had a yellow nonmalodorous vaginal discharge. She placed herself on cephalosporin that she had leftover. She has had no fever and chills she is uncertain if her urine is cloudy. Past Medical History:   Diagnosis Date    ADHD     Asthma     Essential hypertension     last pregnancy; current pregnancy    Ill-defined condition     pseudo-tumor cerebri    Iron deficiency anemia     Other ill-defined conditions(799.89)     pseudo tumor cerebrae    Seizure [facility])        Past Surgical History:   Procedure Laterality Date     DELIVERY ONLY     [employer]  DELIVERY ONLY      HX DILATION AND CURETTAGE      HX GYN       , ,    HX TUBAL LIGATION           Family History:   Problem Relation Age of Onset    Lung Disease Maternal Grandmother     No Known Problems Mother     Hypertension Father        Social History     Socioeconomic History    Marital status: SINGLE     Spouse name: Not on file    Number of children: Not on file    Years of education: Not on file    Highest education level: Not on file   Occupational History    Not on file   Social Needs    Financial resource strain: Not on file    Food insecurity     Worry: Not on file     Inability: Not on file    Transportation needs     Medical: Not on file     Non-medical: Not on file   Tobacco Use    Smoking status: Former Smoker     Packs/day: 0.50     Years: 0.50     Pack years: 0.25     Types: Cigarettes     Last attempt to quit: 2015     Years since quittin.8    Smokeless tobacco: Never Used    Tobacco comment: socially   Substance and Sexual Activity    Alcohol use:  Yes     Alcohol/week: 0.0 standard drinks     Frequency: Monthly or less Comment: socially    Drug use: No    Sexual activity: Yes     Partners: Male     Birth control/protection: None   Lifestyle    Physical activity     Days per week: Not on file     Minutes per session: Not on file    Stress: Not on file   Relationships    Social connections     Talks on phone: Not on file     Gets together: Not on file     Attends Synagogue service: Not on file     Active member of club or organization: Not on file     Attends meetings of clubs or organizations: Not on file     Relationship status: Not on file    Intimate partner violence     Fear of current or ex partner: Not on file     Emotionally abused: Not on file     Physically abused: Not on file     Forced sexual activity: Not on file   Other Topics Concern     Service Not Asked    Blood Transfusions Not Asked    Caffeine Concern Not Asked    Occupational Exposure Not Asked   [employer] [employer] Hazards Not Asked    Sleep Concern Not Asked    Stress Concern Not Asked    Weight Concern Not Asked    Special Diet Not Asked    Back Care Not Asked    Exercise Not Asked    Bike Helmet Not Asked   [address] Not Asked    Self-Exams Not Asked   Social History Narrative    Not on file         ALLERGIES: Diclofenac and Sulfa (sulfonamide antibiotics)    Review of Systems   All other systems reviewed and are negative. Vitals:    11/03/20 1255   BP: 135/75   Pulse: 74   Resp: 16   Temp: 98 °F (36.7 °C)   SpO2: 99%   Weight: 91.2 kg (201 lb 1 oz)   Height: 5' 7\" (1.702 m)            Physical Exam  Vitals signs and nursing note reviewed. Constitutional:       Appearance: She is well-developed. HENT:      Head: Normocephalic and atraumatic. Mouth/Throat:      Pharynx: No oropharyngeal exudate. Eyes:      General: No scleral icterus. Conjunctiva/sclera: Conjunctivae normal.   Neck:      Musculoskeletal: Neck supple. Thyroid: No thyromegaly. Cardiovascular:      Rate and Rhythm: Normal rate and regular rhythm. Heart sounds: Normal heart sounds. No murmur. No friction rub. No gallop. Pulmonary:      Effort: Pulmonary effort is normal. No respiratory distress. Breath sounds: Normal breath sounds. No stridor. No wheezing or rales. Abdominal:      General: Bowel sounds are normal.      Palpations: Abdomen is soft. Tenderness: There is no abdominal tenderness. There is no guarding or rebound. Musculoskeletal: Normal range of motion. Lymphadenopathy:      Cervical: No cervical adenopathy. Skin:     General: Skin is warm and dry. Neurological:      Mental Status: She is alert and oriented to person, place, and time.           MDM       Procedures        Assessment and plan the patient appears to have a urinary tract infection in addition she has clue cells present which may indicate BV and also pseudohyphae consistent with Candida will treat with Cipro Flagyl and Diflucan and close follow-up with her PCP and GYN

## 2020-11-03 NOTE — ED TRIAGE NOTES
Pt ambulatory to ED with no gait disturbance or distress. Pt states that she began experiencing UTI symptoms this past Friday 10/30/2020 and messaged PCP but did not have a response until Monday. States that her PCP sent her a prescription on Monday but pt did not fill it. States has progressively gotten worse and now has flank back pain which she rates 5/10 and vaginal discomfort. Yellow discharge \"enough that it is freaking me out. \" Pt self-medicated with cefdinir on Saturday through Monday. Pt states vaginal discharge, itching, and discomfort.

## 2020-11-03 NOTE — ED NOTES
IV access established, blood drawn, patent & flushed. Pt tolerated well. Pt resting in stretcher, no signs of distress.

## 2020-11-04 LAB
BACTERIA SPEC CULT: NORMAL
C TRACH DNA SPEC QL NAA+PROBE: NEGATIVE
N GONORRHOEA DNA SPEC QL NAA+PROBE: NEGATIVE
SAMPLE TYPE: NORMAL
SERVICE CMNT-IMP: NORMAL
SERVICE CMNT-IMP: NORMAL
SPECIMEN SOURCE: NORMAL

## 2020-11-23 RX ORDER — METOPROLOL SUCCINATE 25 MG/1
TABLET, EXTENDED RELEASE ORAL
Qty: 30 TAB | Refills: 3 | Status: SHIPPED | OUTPATIENT
Start: 2020-11-23 | End: 2021-03-03 | Stop reason: SDUPTHER

## 2021-02-03 ENCOUNTER — VIRTUAL VISIT (OUTPATIENT)
Dept: FAMILY MEDICINE CLINIC | Age: 31
End: 2021-02-03
Payer: COMMERCIAL

## 2021-02-03 DIAGNOSIS — F90.2 ATTENTION DEFICIT HYPERACTIVITY DISORDER (ADHD), COMBINED TYPE: ICD-10-CM

## 2021-02-03 PROCEDURE — 99213 OFFICE O/P EST LOW 20 MIN: CPT | Performed by: NURSE PRACTITIONER

## 2021-02-03 RX ORDER — PRAMIPEXOLE DIHYDROCHLORIDE 0.5 MG/1
TABLET ORAL
Qty: 30 TAB | Refills: 2 | Status: SHIPPED | OUTPATIENT
Start: 2021-02-03 | End: 2021-10-10

## 2021-02-03 RX ORDER — DEXTROAMPHETAMINE SACCHARATE, AMPHETAMINE ASPARTATE, DEXTROAMPHETAMINE SULFATE AND AMPHETAMINE SULFATE 5; 5; 5; 5 MG/1; MG/1; MG/1; MG/1
20 TABLET ORAL 2 TIMES DAILY
Qty: 60 TAB | Refills: 0 | Status: SHIPPED | OUTPATIENT
Start: 2021-02-03 | End: 2021-03-15 | Stop reason: SDUPTHER

## 2021-02-03 NOTE — PROGRESS NOTES
Lester Lund (: 1990) is a 31 y.o. female, established patient, here for evaluation of the following chief complaint(s):   No chief complaint on file. SUBJECTIVE/OBJECTIVE:  HPI  Patient returns to office for follow up ADD. Stable on medication without weight loss, decreased appetite, insomnia, or tremor. Good focus control. Gets three months of scripts at a time. See med order for dose. Review of Systems   . hpisu    No flowsheet data found. [INSTRUCTIONS:  \"[x]\" Indicates a positive item  \"[]\" Indicates a negative item  -- DELETE ALL ITEMS NOT EXAMINED]    Constitutional: [x] Appears well-developed and well-nourished [x] No apparent distress      [] Abnormal -     Mental status: [x] Alert and awake  [x] Oriented to person/place/time [x] Able to follow commands    [] Abnormal -     Eyes:   EOM    [x]  Normal    [] Abnormal -   Sclera  [x]  Normal    [] Abnormal -          Discharge [x]  None visible   [] Abnormal -     HENT: [x] Normocephalic, atraumatic  [] Abnormal -   [x] Mouth/Throat: Mucous membranes are moist    External Ears [x] Normal  [] Abnormal -    Neck: [x] No visualized mass [] Abnormal -     Pulmonary/Chest: [x] Respiratory effort normal   [x] No visualized signs of difficulty breathing or respiratory distress        [] Abnormal -      Musculoskeletal:   [x] Normal gait with no signs of ataxia         [x] Normal range of motion of neck        [] Abnormal -     Neurological:        [x] No Facial Asymmetry (Cranial nerve 7 motor function) (limited exam due to video visit)          [x] No gaze palsy        [] Abnormal -          Skin:        [x] No significant exanthematous lesions or discoloration noted on facial skin         [] Abnormal -            Psychiatric:       [x] Normal Affect [] Abnormal -        [x] No Hallucinations    Other pertinent observable physical exam findings:- none    Diagnoses and all orders for this visit:    1.  Attention deficit hyperactivity disorder (ADHD), combined type  -     dextroamphetamine-amphetamine (ADDERALL) 20 mg tablet; Take 1 Tab by mouth two (2) times a day. - must last 30 days    Other orders  -     pramipexole (MIRAPEX) 0.5 mg tablet; TAKE 1 TABLET BY MOUTH EVERY NIGHT AT 7 PM      Recheck first week of March for well exam and labs  Refills updated        On this date 02/03/21 I have spent 15 minutes reviewing previous notes, test results and face to face (virtual) with the patient discussing the diagnosis and importance of compliance with the treatment plan as well as documenting on the day of the visit. Roz Marks is being evaluated by a Virtual Visit (video visit) encounter to address concerns as mentioned above. A caregiver was present when appropriate. Due to this being a TeleHealth encounter (During [place] [address] public health emergency), evaluation of the following organ systems was limited: Vitals/Constitutional/EENT/Resp/CV/GI//MS/Neuro/Skin/Heme-Lymph-Imm. Pursuant to the emergency declaration under the [address] [place] [address] and the [employer] and [employer] Act, this Virtual Visit was conducted with patient's (and/or legal guardian's) consent, to reduce the patient's risk of exposure to COVID-19 and provide necessary medical care. The patient (and/or legal guardian) has also been advised to contact this office for worsening conditions or problems, and seek emergency medical treatment and/or call 911 if deemed necessary. Patient identification was verified at the start of the visit: YES    Services were provided through a video synchronous discussion virtually to substitute for in-person clinic visit. Patient was located at home and provider was located in office or at home. An electronic signature was used to authenticate this note.   -- Solitario Juárez NP

## 2021-03-03 ENCOUNTER — VIRTUAL VISIT (OUTPATIENT)
Dept: FAMILY MEDICINE CLINIC | Age: 31
End: 2021-03-03
Payer: COMMERCIAL

## 2021-03-03 DIAGNOSIS — R73.01 IMPAIRED FASTING BLOOD SUGAR: ICD-10-CM

## 2021-03-03 DIAGNOSIS — I10 HYPERTENSION, ESSENTIAL: ICD-10-CM

## 2021-03-03 DIAGNOSIS — Z00.00 WELL ADULT EXAM: Primary | ICD-10-CM

## 2021-03-03 DIAGNOSIS — E03.9 ACQUIRED HYPOTHYROIDISM: ICD-10-CM

## 2021-03-03 PROCEDURE — 99395 PREV VISIT EST AGE 18-39: CPT | Performed by: NURSE PRACTITIONER

## 2021-03-03 RX ORDER — METOPROLOL SUCCINATE 25 MG/1
TABLET, EXTENDED RELEASE ORAL
Qty: 90 TAB | Refills: 3 | Status: SHIPPED | OUTPATIENT
Start: 2021-03-03 | End: 2021-10-25 | Stop reason: SDUPTHER

## 2021-03-03 NOTE — PROGRESS NOTES
Chief Complaint   Patient presents with    Labs     Pt being seen for labs     1. Have you been to the ER, urgent care clinic since your last visit? Hospitalized since your last visit? No    2. Have you seen or consulted any other health care providers outside of the [address] [place] [address] since your last visit? Include any pap smears or colon screening.  No     Pt has no other concerns

## 2021-03-03 NOTE — PROGRESS NOTES
Brittny Pryor (: 1990) is a 31 y.o. female, established patient, here for evaluation of the following chief complaint(s):   Labs   SUBJECTIVE/OBJECTIVE:  HPI  Patient is due for  Her annual wellness visit  She is going to go to [facility] to have her labs drawn  Feeling great  Down >50 pounds with diet and exercise  No complaints  Does need refill of her toprol for bp    Review of Systems   A comprehensive review of system was obtained and negative except findings in the HPI    Patient-Reported Vitals 3/3/2021   Patient-Reported Weight 190lb       Physical Exam    [INSTRUCTIONS:  \"[x]\" Indicates a positive item  \"[]\" Indicates a negative item  -- DELETE ALL ITEMS NOT EXAMINED]    Constitutional: [x] Appears well-developed and well-nourished [x] No apparent distress      [] Abnormal -     Mental status: [x] Alert and awake  [x] Oriented to person/place/time [x] Able to follow commands    [] Abnormal -     Eyes:   EOM    [x]  Normal    [] Abnormal -   Sclera  [x]  Normal    [] Abnormal -          Discharge [x]  None visible   [] Abnormal -     HENT: [x] Normocephalic, atraumatic  [] Abnormal -   [x] Mouth/Throat: Mucous membranes are moist    External Ears [x] Normal  [] Abnormal -    Neck: [x] No visualized mass [] Abnormal -     Pulmonary/Chest: [x] Respiratory effort normal   [x] No visualized signs of difficulty breathing or respiratory distress        [] Abnormal -      Musculoskeletal:   [x] Normal gait with no signs of ataxia         [x] Normal range of motion of neck        [] Abnormal -     Neurological:        [x] No Facial Asymmetry (Cranial nerve 7 motor function) (limited exam due to video visit)          [x] No gaze palsy        [] Abnormal -          Skin:        [x] No significant exanthematous lesions or discoloration noted on facial skin         [] Abnormal -            Psychiatric:       [x] Normal Affect [] Abnormal -        [x] No Hallucinations    Other pertinent observable physical exam findings:- none    Diagnoses and all orders for this visit:    1. Well adult exam  -     LIPID PANEL; Future  -     CBC WITH AUTOMATED DIFF; Future  -     METABOLIC PANEL, COMPREHENSIVE; Future  -     TSH 3RD GENERATION; Future    2. Acquired hypothyroidism    3. Hypertension, essential  -     LIPID PANEL; Future  -     CBC WITH AUTOMATED DIFF; Future  -     METABOLIC PANEL, COMPREHENSIVE; Future    4. Impaired fasting blood sugar  -     HEMOGLOBIN A1C WITH EAG; Future    Other orders  -     metoprolol succinate (TOPROL-XL) 25 mg XL tablet; TAKE ONE TABLET BY MOUTH DAILY      Dev plan with labs  Refills updated        On this date 03/03/2021 I have spent 15 minutes reviewing previous notes, test results and face to face (virtual) with the patient discussing the diagnosis and importance of compliance with the treatment plan as well as documenting on the day of the visit. [employer], was evaluated through a synchronous (real-time) audio-video encounter. The patient (or guardian if applicable) is aware that this is a billable service. Verbal consent to proceed has been obtained within the past 12 months. The visit was conducted pursuant to the emergency declaration under the [address] [place] [address] authority and the [employer] (?????) and [employer] General Act. Patient identification was verified, and a caregiver was present when appropriate. The patient was located in a state where the provider was credentialed to provide care. An electronic signature was used to authenticate this note.   -- Елена Rey NP

## 2021-03-05 LAB
ALBUMIN SERPL-MCNC: 4.4 G/DL (ref 3.9–5)
ALBUMIN/GLOB SERPL: 1.8 {RATIO} (ref 1.2–2.2)
ALP SERPL-CCNC: 44 IU/L (ref 39–117)
ALT SERPL-CCNC: 11 IU/L (ref 0–32)
AST SERPL-CCNC: 14 IU/L (ref 0–40)
BASOPHILS # BLD AUTO: 0 X10E3/UL (ref 0–0.2)
BASOPHILS NFR BLD AUTO: 1 %
BILIRUB SERPL-MCNC: 0.6 MG/DL (ref 0–1.2)
BUN SERPL-MCNC: 12 MG/DL (ref 6–20)
BUN/CREAT SERPL: 19 (ref 9–23)
CALCIUM SERPL-MCNC: 9.2 MG/DL (ref 8.7–10.2)
CHLORIDE SERPL-SCNC: 106 MMOL/L (ref 96–106)
CHOLEST SERPL-MCNC: 159 MG/DL (ref 100–199)
CO2 SERPL-SCNC: 21 MMOL/L (ref 20–29)
CREAT SERPL-MCNC: 0.62 MG/DL (ref 0.57–1)
EOSINOPHIL # BLD AUTO: 0.1 X10E3/UL (ref 0–0.4)
EOSINOPHIL NFR BLD AUTO: 1 %
ERYTHROCYTE [DISTWIDTH] IN BLOOD BY AUTOMATED COUNT: 11.5 % (ref 11.7–15.4)
EST. AVERAGE GLUCOSE BLD GHB EST-MCNC: 80 MG/DL
GLOBULIN SER CALC-MCNC: 2.5 G/DL (ref 1.5–4.5)
GLUCOSE SERPL-MCNC: 93 MG/DL (ref 65–99)
HBA1C MFR BLD: 4.4 % (ref 4.8–5.6)
HCT VFR BLD AUTO: 43.2 % (ref 34–46.6)
HDLC SERPL-MCNC: 51 MG/DL
HGB BLD-MCNC: 14.8 G/DL (ref 11.1–15.9)
IMM GRANULOCYTES # BLD AUTO: 0 X10E3/UL (ref 0–0.1)
IMM GRANULOCYTES NFR BLD AUTO: 0 %
IMP & REVIEW OF LAB RESULTS: NORMAL
LDLC SERPL CALC-MCNC: 98 MG/DL (ref 0–99)
LYMPHOCYTES # BLD AUTO: 2.3 X10E3/UL (ref 0.7–3.1)
LYMPHOCYTES NFR BLD AUTO: 37 %
MCH RBC QN AUTO: 33.8 PG (ref 26.6–33)
MCHC RBC AUTO-ENTMCNC: 34.3 G/DL (ref 31.5–35.7)
MCV RBC AUTO: 99 FL (ref 79–97)
MONOCYTES # BLD AUTO: 0.6 X10E3/UL (ref 0.1–0.9)
MONOCYTES NFR BLD AUTO: 9 %
NEUTROPHILS # BLD AUTO: 3.2 X10E3/UL (ref 1.4–7)
NEUTROPHILS NFR BLD AUTO: 52 %
PLATELET # BLD AUTO: 227 X10E3/UL (ref 150–450)
POTASSIUM SERPL-SCNC: 3.7 MMOL/L (ref 3.5–5.2)
PROT SERPL-MCNC: 6.9 G/DL (ref 6–8.5)
RBC # BLD AUTO: 4.38 X10E6/UL (ref 3.77–5.28)
SODIUM SERPL-SCNC: 141 MMOL/L (ref 134–144)
TRIGL SERPL-MCNC: 47 MG/DL (ref 0–149)
TSH SERPL DL<=0.005 MIU/L-ACNC: 2.3 UIU/ML (ref 0.45–4.5)
VLDLC SERPL CALC-MCNC: 10 MG/DL (ref 5–40)
WBC # BLD AUTO: 6.2 X10E3/UL (ref 3.4–10.8)

## 2021-03-15 DIAGNOSIS — F90.2 ATTENTION DEFICIT HYPERACTIVITY DISORDER (ADHD), COMBINED TYPE: ICD-10-CM

## 2021-03-15 RX ORDER — DEXTROAMPHETAMINE SACCHARATE, AMPHETAMINE ASPARTATE, DEXTROAMPHETAMINE SULFATE AND AMPHETAMINE SULFATE 5; 5; 5; 5 MG/1; MG/1; MG/1; MG/1
20 TABLET ORAL 2 TIMES DAILY
Qty: 60 TAB | Refills: 0 | Status: SHIPPED | OUTPATIENT
Start: 2021-03-15 | End: 2021-04-26 | Stop reason: SDUPTHER

## 2021-04-26 DIAGNOSIS — F90.2 ATTENTION DEFICIT HYPERACTIVITY DISORDER (ADHD), COMBINED TYPE: ICD-10-CM

## 2021-04-26 RX ORDER — DEXTROAMPHETAMINE SACCHARATE, AMPHETAMINE ASPARTATE, DEXTROAMPHETAMINE SULFATE AND AMPHETAMINE SULFATE 5; 5; 5; 5 MG/1; MG/1; MG/1; MG/1
20 TABLET ORAL 2 TIMES DAILY
Qty: 60 TAB | Refills: 0 | Status: SHIPPED | OUTPATIENT
Start: 2021-04-26 | End: 2021-05-19 | Stop reason: SDUPTHER

## 2021-05-19 ENCOUNTER — OFFICE VISIT (OUTPATIENT)
Dept: FAMILY MEDICINE CLINIC | Age: 31
End: 2021-05-19
Payer: COMMERCIAL

## 2021-05-19 DIAGNOSIS — F90.2 ATTENTION DEFICIT HYPERACTIVITY DISORDER (ADHD), COMBINED TYPE: ICD-10-CM

## 2021-05-19 PROCEDURE — 99213 OFFICE O/P EST LOW 20 MIN: CPT | Performed by: NURSE PRACTITIONER

## 2021-05-19 RX ORDER — DEXTROAMPHETAMINE SACCHARATE, AMPHETAMINE ASPARTATE, DEXTROAMPHETAMINE SULFATE AND AMPHETAMINE SULFATE 5; 5; 5; 5 MG/1; MG/1; MG/1; MG/1
20 TABLET ORAL 2 TIMES DAILY
Qty: 60 TABLET | Refills: 0 | Status: SHIPPED | OUTPATIENT
Start: 2021-05-26 | End: 2021-10-25 | Stop reason: SDUPTHER

## 2021-05-19 RX ORDER — DEXTROAMPHETAMINE SACCHARATE, AMPHETAMINE ASPARTATE, DEXTROAMPHETAMINE SULFATE AND AMPHETAMINE SULFATE 5; 5; 5; 5 MG/1; MG/1; MG/1; MG/1
20 TABLET ORAL 2 TIMES DAILY
Qty: 60 TABLET | Refills: 0 | Status: SHIPPED | OUTPATIENT
Start: 2021-06-26 | End: 2021-05-25 | Stop reason: SDUPTHER

## 2021-05-19 RX ORDER — DEXTROAMPHETAMINE SACCHARATE, AMPHETAMINE ASPARTATE, DEXTROAMPHETAMINE SULFATE AND AMPHETAMINE SULFATE 5; 5; 5; 5 MG/1; MG/1; MG/1; MG/1
20 TABLET ORAL 2 TIMES DAILY
Qty: 60 TABLET | Refills: 0 | Status: SHIPPED | OUTPATIENT
Start: 2021-07-26 | End: 2021-10-25 | Stop reason: SDUPTHER

## 2021-05-19 NOTE — PROGRESS NOTES
Chief Complaint   Patient presents with    Medication Refill     Pt being seen today for med refill  -pt states meds are still working for her    1. Have you been to the ER, urgent care clinic since your last visit? Hospitalized since your last visit? No    2. Have you seen or consulted any other health care providers outside of the [address] [place] [address] since your last visit? Include any pap smears or colon screening.  No     Pt has no other concerns

## 2021-05-19 NOTE — PROGRESS NOTES
Alayna Banerjee (: 1990) is a 31 y.o. female, established patient, here for evaluation of the following chief complaint(s):   Medication Refill       No follow-ups on file. SUBJECTIVE/OBJECTIVE:  HPI  Patient returns to office for follow up ADD. Stable on medication without weight loss, decreased appetite, insomnia, or tremor. Good focus control. Gets three months of scripts at a time. See med order for dose.     Review of Systems   A comprehensive review of system was obtained and negative except findings in the HPI    Patient-Reported Vitals 3/3/2021   Patient-Reported Weight 190lb       Physical Exam    [INSTRUCTIONS:  \"[x]\" Indicates a positive item  \"[]\" Indicates a negative item  -- DELETE ALL ITEMS NOT EXAMINED]    Constitutional: [x] Appears well-developed and well-nourished [x] No apparent distress      [] Abnormal -     Mental status: [x] Alert and awake  [x] Oriented to person/place/time [x] Able to follow commands    [] Abnormal -     Eyes:   EOM    [x]  Normal    [] Abnormal -   Sclera  [x]  Normal    [] Abnormal -          Discharge [x]  None visible   [] Abnormal -     HENT: [x] Normocephalic, atraumatic  [] Abnormal -   [x] Mouth/Throat: Mucous membranes are moist    External Ears [x] Normal  [] Abnormal -    Neck: [x] No visualized mass [] Abnormal -     Pulmonary/Chest: [x] Respiratory effort normal   [x] No visualized signs of difficulty breathing or respiratory distress        [] Abnormal -      Musculoskeletal:   [x] Normal gait with no signs of ataxia         [x] Normal range of motion of neck        [] Abnormal -     Neurological:        [x] No Facial Asymmetry (Cranial nerve 7 motor function) (limited exam due to video visit)          [x] No gaze palsy        [] Abnormal -          Skin:        [x] No significant exanthematous lesions or discoloration noted on facial skin         [] Abnormal -            Psychiatric:       [x] Normal Affect [] Abnormal -        [x] No Hallucinations    Other pertinent observable physical exam findings:- none    Diagnoses and all orders for this visit:    1. Attention deficit hyperactivity disorder (ADHD), combined type  -     dextroamphetamine-amphetamine (ADDERALL) 20 mg tablet; Take 1 Tablet by mouth two (2) times a day. - must last 30 days  -     dextroamphetamine-amphetamine (ADDERALL) 20 mg tablet; Take 1 Tablet by mouth two (2) times a day. Max Daily Amount: 40 mg.  -     dextroamphetamine-amphetamine (ADDERALL) 20 mg tablet; Take 1 Tablet by mouth two (2) times a day. Max Daily Amount: 40 mg. Refills x 3 given  Follow up 3 mo      On this date 05/19/2021 I have spent 15 minutes reviewing previous notes, test results and face to face (virtual) with the patient discussing the diagnosis and importance of compliance with the treatment plan as well as documenting on the day of the visit. [employer], was evaluated through a synchronous (real-time) audio-video encounter. The patient (or guardian if applicable) is aware that this is a billable service. Verbal consent to proceed has been obtained within the past 12 months. The visit was conducted pursuant to the emergency declaration under the [facility], [address] [place] [address] authority and the [employer] and [employer] General Act. Patient identification was verified, and a caregiver was present when appropriate. The patient was located in a state where the provider was credentialed to provide care. An electronic signature was used to authenticate this note.   -- Eladia Levine NP

## 2021-05-25 ENCOUNTER — TELEPHONE (OUTPATIENT)
Dept: FAMILY MEDICINE CLINIC | Age: 31
End: 2021-05-25

## 2021-05-25 DIAGNOSIS — F90.2 ATTENTION DEFICIT HYPERACTIVITY DISORDER (ADHD), COMBINED TYPE: ICD-10-CM

## 2021-05-25 RX ORDER — DEXTROAMPHETAMINE SACCHARATE, AMPHETAMINE ASPARTATE, DEXTROAMPHETAMINE SULFATE AND AMPHETAMINE SULFATE 5; 5; 5; 5 MG/1; MG/1; MG/1; MG/1
20 TABLET ORAL 2 TIMES DAILY
Qty: 60 TABLET | Refills: 0 | Status: SHIPPED | OUTPATIENT
Start: 2021-05-25 | End: 2021-10-25 | Stop reason: SDUPTHER

## 2021-05-25 NOTE — TELEPHONE ENCOUNTER
Patient called because she can't fill her Adderall.  She's running out two days before refill date because months have different amt of days

## 2021-05-25 NOTE — TELEPHONE ENCOUNTER
Spoke with pharmacist, rx sent dated for today and one for tomorrow will be deleted. She will relay info to patient.  Cristel Tavares

## 2021-05-25 NOTE — TELEPHONE ENCOUNTER
[facility]/[phone]- brief version/ Adderall medication with effective date needs to be updated. Pharmacist leaves before 2:00pm and is asking for a phone call before then.

## 2021-08-25 ENCOUNTER — DOCUMENTATION ONLY (OUTPATIENT)
Dept: FAMILY MEDICINE CLINIC | Age: 31
End: 2021-08-25

## 2021-08-25 ENCOUNTER — OFFICE VISIT (OUTPATIENT)
Dept: FAMILY MEDICINE CLINIC | Age: 31
End: 2021-08-25
Payer: COMMERCIAL

## 2021-08-25 DIAGNOSIS — Z51.81 MEDICATION MONITORING ENCOUNTER: Primary | ICD-10-CM

## 2021-08-25 LAB
AMPHETAMINE QL URINE POC: NORMAL
BARBITURATES UR POC: NEGATIVE
BENZODIAZEPINES UR POC: NEGATIVE
COCAINE QL URINE POC: NEGATIVE
LOT EXP DATE POC: NORMAL
LOT NUMBER POC: NORMAL
MARIJUANA (THC) QL URINE POC: NEGATIVE
MDMA/ECSTASY UR POC: NEGATIVE
METHADONE QL URINE POC: NEGATIVE
METHAMPHETAMINE QL URINE POC: NEGATIVE
NTI OTHER MICRO TRANSPORT 977598: NEGATIVE
OPIATES QL URINE POC: NEGATIVE
OXYCODONE UR POC: NEGATIVE
VALID INTERNAL CONTROL?: YES

## 2021-08-25 PROCEDURE — 80305 DRUG TEST PRSMV DIR OPT OBS: CPT | Performed by: FAMILY MEDICINE

## 2021-08-25 NOTE — PATIENT INSTRUCTIONS
A Healthy Lifestyle: Care Instructions  Your Care Instructions     A healthy lifestyle can help you feel good, stay at a healthy weight, and have plenty of energy for both work and play. A healthy lifestyle is something you can share with your whole family. A healthy lifestyle also can lower your risk for serious health problems, such as high blood pressure, heart disease, and diabetes. You can follow a few steps listed below to improve your health and the health of your family. Follow-up care is a key part of your treatment and safety. Be sure to make and go to all appointments, and call your doctor if you are having problems. It's also a good idea to know your test results and keep a list of the medicines you take. How can you care for yourself at home? · Do not eat too much sugar, fat, or fast foods. You can still have dessert and treats now and then. The goal is moderation. · Start small to improve your eating habits. Pay attention to portion sizes, drink less juice and soda pop, and eat more fruits and vegetables. ? Eat a healthy amount of food. A 3-ounce serving of meat, for example, is about the size of a deck of cards. Fill the rest of your plate with vegetables and whole grains. ? Limit the amount of soda and sports drinks you have every day. Drink more water when you are thirsty. ? Eat plenty of fruits and vegetables every day. Have an apple or some carrot sticks as an afternoon snack instead of a candy bar. Try to have fruits and/or vegetables at every meal.  · Make exercise part of your daily routine. You may want to start with simple activities, such as walking, bicycling, or slow swimming. Try to be active 30 to 60 minutes every day. You do not need to do all 30 to 60 minutes all at once. For example, you can exercise 3 times a day for 10 or 20 minutes.  Moderate exercise is safe for most people, but it is always a good idea to talk to your doctor before starting an exercise program.  · Keep moving. Heriberto Lopez the lawn, work in the garden, or [employer]. Take the stairs instead of the elevator at work. · If you smoke, quit. People who smoke have an increased risk for heart attack, stroke, cancer, and other lung illnesses. Quitting is hard, but there are ways to boost your chance of quitting tobacco for good. ? Use nicotine gum, patches, or lozenges. ? Ask your doctor about stop-smoking programs and medicines. ? Keep trying. In addition to reducing your risk of diseases in the future, you will notice some benefits soon after you stop using tobacco. If you have shortness of breath or asthma symptoms, they will likely get better within a few weeks after you quit. · Limit how much alcohol you drink. Moderate amounts of alcohol (up to 2 drinks a day for men, 1 drink a day for women) are okay. But drinking too much can lead to liver problems, high blood pressure, and other health problems. Family health  If you have a family, there are many things you can do together to improve your health. · Eat meals together as a family as often as possible. · Eat healthy foods. This includes fruits, vegetables, lean meats and dairy, and whole grains. · Include your family in your fitness plan. Most people think of activities such as jogging or tennis as the way to fitness, but there are many ways you and your family can be more active. Anything that makes you breathe hard and gets your heart pumping is exercise. Here are some tips:  ? Walk to do errands or to take your child to school or the bus.  ? Go for a family bike ride after dinner instead of watching TV. Where can you learn more? Go to http://ezequiel-mitchell.info/  Enter O053 in the search box to learn more about \"A Healthy Lifestyle: Care Instructions. \"  Current as of: September 23, 2020               Content Version: 12.8  © 5330-3142 Healthwise, Incorporated.    Care instructions adapted under license by [employer] (which disclaims liability or warranty for this information). If you have questions about a medical condition or this instruction, always ask your healthcare professional. [address] any warranty or liability for your use of this information.

## 2021-08-25 NOTE — PROGRESS NOTES
Chief Complaint   Patient presents with    Drug Screen     Patient presents in office today for UDS only for PA.

## 2021-08-25 NOTE — PROGRESS NOTES
PA for Adderall 20 mg faxed to [facility] [place], awaiting response.

## 2021-08-26 ENCOUNTER — DOCUMENTATION ONLY (OUTPATIENT)
Dept: FAMILY MEDICINE CLINIC | Age: 31
End: 2021-08-26

## 2021-08-26 NOTE — PROGRESS NOTES
PA for Adderall approved from 8/26/21 - 8/26/22, pharmacy informed & will inform pt when script ready.

## 2021-09-17 ENCOUNTER — TELEPHONE (OUTPATIENT)
Dept: FAMILY MEDICINE CLINIC | Age: 31
End: 2021-09-17

## 2021-09-17 RX ORDER — LEVONORGESTREL AND ETHINYL ESTRADIOL 0.1-0.02MG
1 KIT ORAL DAILY
Qty: 1 DOSE PACK | Refills: 5 | Status: SHIPPED | OUTPATIENT
Start: 2021-09-17 | End: 2021-10-25 | Stop reason: SDUPTHER

## 2021-09-17 RX ORDER — HYDROXYZINE PAMOATE 25 MG/1
25 CAPSULE ORAL 2 TIMES DAILY
Qty: 28 CAPSULE | Refills: 0 | Status: SHIPPED | OUTPATIENT
Start: 2021-09-17 | End: 2021-09-17 | Stop reason: SDUPTHER

## 2021-09-17 RX ORDER — LEVONORGESTREL AND ETHINYL ESTRADIOL 0.1-0.02MG
1 KIT ORAL DAILY
Qty: 1 DOSE PACK | Refills: 5 | Status: SHIPPED | OUTPATIENT
Start: 2021-09-17 | End: 2021-09-17 | Stop reason: SDUPTHER

## 2021-09-17 RX ORDER — HYDROXYZINE PAMOATE 25 MG/1
25 CAPSULE ORAL 2 TIMES DAILY
Qty: 28 CAPSULE | Refills: 0 | Status: SHIPPED | OUTPATIENT
Start: 2021-09-17 | End: 2021-10-01

## 2021-09-20 NOTE — TELEPHONE ENCOUNTER
After hours call: panic attack and PMDD, worsening sx, started cycle today which has helped some, not on ocp, has tubal ligation. Nothing for panic. Sent in OCP to start this Sunday for PMDD regulation and given hydroxyzine  for prn use.  Daniel Campos

## 2021-09-22 ENCOUNTER — TELEPHONE (OUTPATIENT)
Dept: FAMILY MEDICINE CLINIC | Age: 31
End: 2021-09-22

## 2021-10-10 RX ORDER — PRAMIPEXOLE DIHYDROCHLORIDE 0.5 MG/1
TABLET ORAL
Qty: 30 TABLET | Refills: 2 | Status: SHIPPED | OUTPATIENT
Start: 2021-10-10 | End: 2021-10-25 | Stop reason: SDUPTHER

## 2021-10-25 ENCOUNTER — VIRTUAL VISIT (OUTPATIENT)
Dept: FAMILY MEDICINE CLINIC | Age: 31
End: 2021-10-25
Payer: COMMERCIAL

## 2021-10-25 DIAGNOSIS — F32.81 PMDD (PREMENSTRUAL DYSPHORIC DISORDER): ICD-10-CM

## 2021-10-25 DIAGNOSIS — G25.81 RESTLESS LEG SYNDROME: ICD-10-CM

## 2021-10-25 DIAGNOSIS — F90.2 ATTENTION DEFICIT HYPERACTIVITY DISORDER (ADHD), COMBINED TYPE: Primary | ICD-10-CM

## 2021-10-25 PROCEDURE — 99214 OFFICE O/P EST MOD 30 MIN: CPT | Performed by: NURSE PRACTITIONER

## 2021-10-25 RX ORDER — LEVONORGESTREL AND ETHINYL ESTRADIOL 0.1-0.02MG
1 KIT ORAL DAILY
Qty: 3 DOSE PACK | Refills: 2 | Status: SHIPPED | OUTPATIENT
Start: 2021-10-25 | End: 2022-10-27

## 2021-10-25 RX ORDER — DEXTROAMPHETAMINE SACCHARATE, AMPHETAMINE ASPARTATE, DEXTROAMPHETAMINE SULFATE AND AMPHETAMINE SULFATE 5; 5; 5; 5 MG/1; MG/1; MG/1; MG/1
20 TABLET ORAL 2 TIMES DAILY
Qty: 60 TABLET | Refills: 0 | Status: SHIPPED | OUTPATIENT
Start: 2021-10-25 | End: 2022-04-18 | Stop reason: SDUPTHER

## 2021-10-25 RX ORDER — METOPROLOL SUCCINATE 25 MG/1
TABLET, EXTENDED RELEASE ORAL
Qty: 90 TABLET | Refills: 1 | Status: SHIPPED | OUTPATIENT
Start: 2021-10-25 | End: 2022-04-18 | Stop reason: SDUPTHER

## 2021-10-25 RX ORDER — DEXTROAMPHETAMINE SACCHARATE, AMPHETAMINE ASPARTATE, DEXTROAMPHETAMINE SULFATE AND AMPHETAMINE SULFATE 5; 5; 5; 5 MG/1; MG/1; MG/1; MG/1
20 TABLET ORAL 2 TIMES DAILY
Qty: 60 TABLET | Refills: 0 | Status: SHIPPED | OUTPATIENT
Start: 2021-12-20 | End: 2021-12-13 | Stop reason: SDUPTHER

## 2021-10-25 RX ORDER — DEXTROAMPHETAMINE SACCHARATE, AMPHETAMINE ASPARTATE, DEXTROAMPHETAMINE SULFATE AND AMPHETAMINE SULFATE 5; 5; 5; 5 MG/1; MG/1; MG/1; MG/1
20 TABLET ORAL 2 TIMES DAILY
Qty: 60 TABLET | Refills: 0 | Status: SHIPPED | OUTPATIENT
Start: 2021-11-22 | End: 2021-12-13 | Stop reason: SDUPTHER

## 2021-10-25 RX ORDER — PRAMIPEXOLE DIHYDROCHLORIDE 0.5 MG/1
TABLET ORAL
Qty: 90 TABLET | Refills: 1 | Status: SHIPPED | OUTPATIENT
Start: 2021-10-25 | End: 2022-04-18 | Stop reason: SDUPTHER

## 2021-10-25 NOTE — PROGRESS NOTES
Letha Herrera is a 36 y.o. female who was seen by synchronous (real-time) audio-video technology on 10/25/2021 for Medication Refill        Assessment & Plan:   Diagnoses and all orders for this visit:    1. Attention deficit hyperactivity disorder (ADHD), combined type  Symptoms controlled   review is without irregularities  Urine compliance screening up to date  Continue rx  Face to face visit required in 3 months for add'l refills  -     dextroamphetamine-amphetamine (ADDERALL) 20 mg tablet; Take 1 Tablet by mouth two (2) times a day. Max Daily Amount: 40 mg.  -     dextroamphetamine-amphetamine (ADDERALL) 20 mg tablet; Take 1 Tablet by mouth two (2) times a day. - must last 30 days  -     dextroamphetamine-amphetamine (ADDERALL) 20 mg tablet; Take 1 Tablet by mouth two (2) times a day. Max Daily Amount: 40 mg.    2. Restless leg syndrome  Controlled  Continue rx  -     pramipexole (MIRAPEX) 0.5 mg tablet; TAKE 1 TABLET BY MOUTH EVERY NIGHT AT 7PM    3. PMDD (premenstrual dysphoric disorder)  Improving on combination ocp  Continue rx  ACHES reviewed  -     levonorgestrel-ethinyl estradiol (NILSA Grover) 0.1-20 mg-mcg tab; Take 1 Tablet by mouth daily. Other orders  -     metoprolol succinate (TOPROL-XL) 25 mg XL tablet; TAKE ONE TABLET BY MOUTH DAILY        Follow-up and Dispositions    · Return in about 3 months (around 1/25/2022), or if symptoms worsen or fail to improve, for ADHD. I have discussed the diagnosis with the patient and the intended plan as seen in the above orders, and questions were answered concerning future plans. Patient conveyed understanding of the plan at the time of the visit. 712  Subjective:     HPI:    Presents for follow up of ADD, restless leg syndrome, and premenstrual dysphoric syndrome. f/u of ADD:  Doing well, reports symptoms well-controlled on current dose adderall, good compliance, tolerating well without apparent side effects.  Denies chest pain, dyspnea on exertion, headache, blurred vision, tremor, appetite change, or insomnia. Last urine compliance screening: Aug 2021    Notes restless leg symptoms well-controlled on current dose mirapex, good compliance, tolerating well, no apparent side effects. Notes some improvement of PMDD symptoms on alesse, has only been taking for about 2 months. Reports good compliance, tolerating well, no apparent side effects. Requesting 90 day supplies rather than 30 day. Prior to Admission medications    Medication Sig Start Date End Date Taking? Authorizing Provider   pramipexole (MIRAPEX) 0.5 mg tablet TAKE 1 TABLET BY MOUTH EVERY NIGHT AT 7PM 10/25/21  Yes Vinnie Tim, NP   levonorgestrel-ethinyl estradiol (AVIANE, ALESSE, LESSINA) 0.1-20 mg-mcg tab Take 1 Tablet by mouth daily. 10/25/21  Yes Vinnie Tim NP   dextroamphetamine-amphetamine (ADDERALL) 20 mg tablet Take 1 Tablet by mouth two (2) times a day. Max Daily Amount: 40 mg. 12/20/21  Yes Vinnie Tim NP   dextroamphetamine-amphetamine (ADDERALL) 20 mg tablet Take 1 Tablet by mouth two (2) times a day. - must last 30 days 11/22/21  Yes Vinnie Tim NP   dextroamphetamine-amphetamine (ADDERALL) 20 mg tablet Take 1 Tablet by mouth two (2) times a day.  Max Daily Amount: 40 mg. 10/25/21  Yes Vinnie Tim NP   metoprolol succinate (TOPROL-XL) 25 mg XL tablet TAKE ONE TABLET BY MOUTH DAILY 10/25/21  Yes Vinnie Tim, NP   budesonide-formoteroL (SYMBICORT) 160-4.5 mcg/actuation HFAA INHALE 2 PUFFS BY MOUTH TWICE DAILY 9/2/20  Yes Vinayak Bailey NP     Patient Active Problem List   Diagnosis Code    Anxiety associated with depression F41.8    Headache(784.0) R51    Pseudotumor cerebri G93.2    Asthma J45.909    Other bipolar disorders F31.89    Somatization disorder F45.0    Anxiety F41.9    Attention deficit hyperactivity disorder (ADHD), combined type F90.2    Chest pain R07.9    Obesity, morbid ([address] E66.01 Allergies   Allergen Reactions    Diclofenac Nausea Only    Sulfa (Sulfonamide Antibiotics) Hives and Other (comments)     gas     Past Medical History:   Diagnosis Date    ADHD     Asthma     Essential hypertension     last pregnancy; current pregnancy    Ill-defined condition     pseudo-tumor cerebri    Iron deficiency anemia     Other ill-defined conditions(799.89)     pseudo tumor cerebrae    Seizure [facility])      Past Surgical History:   Procedure Laterality Date    HX DILATION AND CURETTAGE      HX GYN       , ,    HX TUBAL LIGATION      [place]  DEL[place]      [place]       Family History   Problem Relation Age of Onset    Lung Disease Maternal Grandmother     No Known Problems Mother     Hypertension Father      Social History     Tobacco Use    Smoking status: Former Smoker     Packs/day: 0.50     Years: 0.50     Pack years: 0.25     Types: Cigarettes     Quit date: 2015     Years since quittin.8    Smokeless tobacco: Never Used    Tobacco comment: socially   Substance Use Topics    Alcohol use: Yes     Alcohol/week: 0.0 standard drinks     Comment: socially       Review of Systems   Constitutional: Negative for chills, fever, malaise/fatigue and weight loss. HENT: Negative. Eyes: Negative. Respiratory: Negative. Cardiovascular: Negative. Gastrointestinal: Negative. Genitourinary: Negative. Musculoskeletal: Negative. Skin: Negative. Neurological: Negative. Endo/Heme/Allergies: Negative. Psychiatric/Behavioral: Negative for hallucinations, memory loss, substance abuse and suicidal ideas. The patient does not have insomnia.         Objective:     Patient-Reported Vitals 3/3/2021   Patient-Reported Weight 190lb      General: alert, cooperative, no distress   Mental  status: normal mood, behavior, speech, dress, motor activity, and thought processes, able to follow commands   HENT: NCAT   Neck: no visualized mass   Resp: no respiratory distress   Neuro: no gross deficits   Skin: no discoloration or lesions of concern on visible areas   Psychiatric: normal affect, consistent with stated mood, no evidence of hallucinations     Additional exam findings:   none    We discussed the expected course, resolution and complications of the diagnosis(es) in detail. Medication risks, benefits, costs, interactions, and alternatives were discussed as indicated. I advised her to contact the office if her condition worsens, changes or fails to improve as anticipated. She expressed understanding with the diagnosis(es) and plan. [employer], was evaluated through a synchronous (real-time) audio-video encounter. The patient (or guardian if applicable) is aware that this is a billable service. Verbal consent to proceed has been obtained within the past 12 months. The visit was conducted pursuant to the emergency declaration under the [facility], [address] [place] [address] authority and the [employer] and [employer] General Act. Patient identification was verified, and a caregiver was present when appropriate. The patient was located in a state where the provider was credentialed to provide care.     Keyla Bridges NP

## 2021-10-31 NOTE — PATIENT INSTRUCTIONS
Attention Deficit Hyperactivity Disorder (ADHD) in Adults: Care Instructions  Your Care Instructions     Attention deficit hyperactivity disorder, or ADHD, is a condition that makes it hard to pay attention. So you may have problems when you try to focus, get organized, and finish tasks. It might make you more active than other people. Or you might do things without thinking first.  ADHD is very common. It usually starts in early childhood. Many adults don't realize they have it until their children are diagnosed. Then they become aware of their own symptoms. Doctors don't know what causes ADHD. But it often runs in families. ADHD can be treated with medicines, behavior training, and counseling. Treatment can improve your life. Follow-up care is a key part of your treatment and safety. Be sure to make and go to all appointments, and call your doctor if you are having problems. It's also a good idea to know your test results and keep a list of the medicines you take. How can you care for yourself at home? · Learn all you can about ADHD. This will help you and your family understand it better. · Take your medicines exactly as prescribed. Call your doctor if you think you are having a problem with your medicine. You will get more details on the specific medicines your doctor prescribes. · If you miss a dose of your medicine, do not take an extra dose. · If your doctor suggests counseling, find a counselor you like and trust. Talk openly and honestly. Be willing to make some changes. · Find a support group for adults with ADHD. Talking to others with the same problems can help you feel better. It can also give you ideas about how to best cope with the condition. · Get rid of distractions at your work space. Keep your desk clean. Try not to face a window or busy hallway. · Use files, planners, and other tools to keep you organized. · Limit use of alcohol, and do not use illegal drugs.  People with ADHD tend to develop substance use disorder more easily than others. Tell your doctor if you need help to quit. Counseling, support groups, and sometimes medicines can help you stay free of alcohol or drugs. · Get at least 30 minutes of physical activity on most days of the week. Exercise has been shown to help people cope with ADHD. Walking is a good choice. You also may want to do other activities, such as running, swimming, cycling, or playing tennis or team sports. When should you call for help? Watch closely for changes in your health, and be sure to contact your doctor if:    · You feel sad a lot or cry all the time.     · You have trouble sleeping, or you sleep too much.     · You find it hard to concentrate, make decisions, or remember things.     · You change how you normally eat.     · You feel guilty for no reason. Where can you learn more? Go to http://www.costa.com/  Enter B196 in the search box to learn more about \"Attention Deficit Hyperactivity Disorder (ADHD) in Adults: Care Instructions. \"  Current as of: June 16, 2021               Content Version: 13.0  © 2632-2848 [employer]. Care instructions adapted under license by [employer] (which disclaims liability or warranty for this information). If you have questions about a medical condition or this instruction, always ask your healthcare professional. [address] any warranty or liability for your use of this information. Premenstrual Dysphoric Disorder (PMDD): Care Instructions  Your Care Instructions     Premenstrual dysphoric disorder (PMDD) is a severe form of premenstrual syndrome (PMS). For most women, symptoms start about a week before their periods start. Then symptoms go away a few days after their periods start. Doctors don't know why some women have PMDD and others don't. They also don't know why some women have worse symptoms than others.   Symptoms include mood swings, depression, and feeling grouchy or anxious. You may also have sore breasts, bloating and weight gain, or joint or muscle pain. With PMDD, these symptoms seriously disrupt your life. They may affect your relationships, work, or school. Home treatments can help you feel better. Regular exercise and healthy eating also can help. Doctors often prescribe medicines for PMDD. Selective serotonin reuptake inhibitors (SSRIs) can relieve symptoms. So can low-estrogen birth control pills. If these don't help, your doctor may prescribe other medicines. Follow-up care is a key part of your treatment and safety. Be sure to make and go to all appointments, and call your doctor if you are having problems. It's also a good idea to know your test results and keep a list of the medicines you take. How can you care for yourself at home? · Take your medicines exactly as prescribed. Call your doctor if you think you are having a problem with your medicine. · Take anti-inflammatory medicines if your body aches or your breasts are sore. These include ibuprofen (Advil, Motrin) and naproxen (Aleve). Read and follow all instructions on the label. · Get regular daily exercise to help improve your mood. Walks are a good choice. · Some foods and drinks can make symptoms worse. Try to drink less caffeine or alcohol while you have PMDD or several days before you expect to have symptoms. You may also want to eat less salt then too. · Manage your stress. Try to meditate, do guided imagery, or practice breathing exercises. When should you call for help? Call 911  anytime you think you may need emergency care. For example, call if:    · You feel you cannot stop from hurting yourself or someone else. Call your doctor now or seek immediate medical care if:    · You have severe vaginal bleeding.     · You have new or worse belly or pelvic pain.    Watch closely for changes in your health, and be sure to contact your doctor if:    · You have unusual vaginal bleeding.     · You do not get better as expected. Where can you learn more? Go to http://www.gray.com/  Enter P101 in the search box to learn more about \"Premenstrual Dysphoric Disorder (PMDD): Care Instructions. \"  Current as of: February 11, 2021               Content Version: 13.0  © 2737-7296 [employer]. Care instructions adapted under license by [employer] (which disclaims liability or warranty for this information). If you have questions about a medical condition or this instruction, always ask your healthcare professional. [place] [address] any warranty or liability for your use of this information.

## 2021-12-13 ENCOUNTER — TELEPHONE (OUTPATIENT)
Dept: FAMILY MEDICINE CLINIC | Age: 31
End: 2021-12-13

## 2021-12-13 DIAGNOSIS — F90.2 ATTENTION DEFICIT HYPERACTIVITY DISORDER (ADHD), COMBINED TYPE: ICD-10-CM

## 2021-12-13 RX ORDER — DEXTROAMPHETAMINE SACCHARATE, AMPHETAMINE ASPARTATE, DEXTROAMPHETAMINE SULFATE AND AMPHETAMINE SULFATE 5; 5; 5; 5 MG/1; MG/1; MG/1; MG/1
20 TABLET ORAL 2 TIMES DAILY
Qty: 60 TABLET | Refills: 0 | Status: SHIPPED | OUTPATIENT
Start: 2021-12-13 | End: 2021-12-23 | Stop reason: SDUPTHER

## 2021-12-13 RX ORDER — DEXTROAMPHETAMINE SACCHARATE, AMPHETAMINE ASPARTATE, DEXTROAMPHETAMINE SULFATE AND AMPHETAMINE SULFATE 5; 5; 5; 5 MG/1; MG/1; MG/1; MG/1
20 TABLET ORAL 2 TIMES DAILY
Qty: 60 TABLET | Refills: 0 | Status: SHIPPED | OUTPATIENT
Start: 2022-01-10 | End: 2022-04-18 | Stop reason: SDUPTHER

## 2021-12-13 NOTE — TELEPHONE ENCOUNTER
Please all and cancel the prescriptions for adderall on file at the [facility]. Once they are cancelled I can send new prescriptions to the [facility] as requested.

## 2021-12-13 NOTE — TELEPHONE ENCOUNTER
[facility] (pharmac[facility]). Rx cancelled as requested.

## 2021-12-13 NOTE — TELEPHONE ENCOUNTER
Patient would like to have her 11/22/21 & 12/20/21 adderall refills changed to [address] [place] [address] Avenue instead of [address]. Call & let her know when this has been done so she can .

## 2021-12-22 ENCOUNTER — TELEPHONE (OUTPATIENT)
Dept: FAMILY MEDICINE CLINIC | Age: 31
End: 2021-12-22

## 2021-12-22 DIAGNOSIS — F90.2 ATTENTION DEFICIT HYPERACTIVITY DISORDER (ADHD), COMBINED TYPE: ICD-10-CM

## 2021-12-22 NOTE — TELEPHONE ENCOUNTER
lindsay does not have this medication can you please send the adderall to this pharmacy just for this month please

## 2021-12-23 RX ORDER — DEXTROAMPHETAMINE SACCHARATE, AMPHETAMINE ASPARTATE, DEXTROAMPHETAMINE SULFATE AND AMPHETAMINE SULFATE 5; 5; 5; 5 MG/1; MG/1; MG/1; MG/1
20 TABLET ORAL 2 TIMES DAILY
Qty: 60 TABLET | Refills: 0 | Status: SHIPPED | OUTPATIENT
Start: 2021-12-23 | End: 2022-04-18 | Stop reason: SDUPTHER

## 2022-03-09 ENCOUNTER — TELEPHONE (OUTPATIENT)
Dept: FAMILY MEDICINE CLINIC | Age: 32
End: 2022-03-09

## 2022-03-19 PROBLEM — E66.01 OBESITY, MORBID (HCC): Status: ACTIVE | Noted: 2017-12-06

## 2022-04-18 ENCOUNTER — VIRTUAL VISIT (OUTPATIENT)
Dept: FAMILY MEDICINE CLINIC | Age: 32
End: 2022-04-18
Payer: COMMERCIAL

## 2022-04-18 DIAGNOSIS — G25.81 RESTLESS LEG SYNDROME: ICD-10-CM

## 2022-04-18 DIAGNOSIS — F90.2 ATTENTION DEFICIT HYPERACTIVITY DISORDER (ADHD), COMBINED TYPE: Primary | ICD-10-CM

## 2022-04-18 DIAGNOSIS — I10 HYPERTENSION, ESSENTIAL: ICD-10-CM

## 2022-04-18 PROCEDURE — 99214 OFFICE O/P EST MOD 30 MIN: CPT | Performed by: NURSE PRACTITIONER

## 2022-04-18 RX ORDER — PRAMIPEXOLE DIHYDROCHLORIDE 0.5 MG/1
TABLET ORAL
Qty: 90 TABLET | Refills: 1 | Status: SHIPPED | OUTPATIENT
Start: 2022-04-18 | End: 2022-07-01 | Stop reason: SDUPTHER

## 2022-04-18 RX ORDER — DEXTROAMPHETAMINE SACCHARATE, AMPHETAMINE ASPARTATE, DEXTROAMPHETAMINE SULFATE AND AMPHETAMINE SULFATE 5; 5; 5; 5 MG/1; MG/1; MG/1; MG/1
20 TABLET ORAL 2 TIMES DAILY
Qty: 60 TABLET | Refills: 0 | Status: SHIPPED | OUTPATIENT
Start: 2022-05-16 | End: 2022-10-05 | Stop reason: SDUPTHER

## 2022-04-18 RX ORDER — DEXTROAMPHETAMINE SACCHARATE, AMPHETAMINE ASPARTATE, DEXTROAMPHETAMINE SULFATE AND AMPHETAMINE SULFATE 5; 5; 5; 5 MG/1; MG/1; MG/1; MG/1
20 TABLET ORAL 2 TIMES DAILY
Qty: 60 TABLET | Refills: 0 | Status: SHIPPED | OUTPATIENT
Start: 2022-06-13 | End: 2022-07-01 | Stop reason: SDUPTHER

## 2022-04-18 RX ORDER — DEXTROAMPHETAMINE SACCHARATE, AMPHETAMINE ASPARTATE, DEXTROAMPHETAMINE SULFATE AND AMPHETAMINE SULFATE 5; 5; 5; 5 MG/1; MG/1; MG/1; MG/1
20 TABLET ORAL 2 TIMES DAILY
Qty: 60 TABLET | Refills: 0 | Status: SHIPPED | OUTPATIENT
Start: 2022-04-18 | End: 2022-10-05 | Stop reason: SDUPTHER

## 2022-04-18 RX ORDER — METOPROLOL SUCCINATE 25 MG/1
TABLET, EXTENDED RELEASE ORAL
Qty: 90 TABLET | Refills: 1 | Status: SHIPPED | OUTPATIENT
Start: 2022-04-18 | End: 2022-07-01 | Stop reason: SDUPTHER

## 2022-04-18 NOTE — PROGRESS NOTES
Heaven Vaughn is a 36 y.o. female who was seen by synchronous (real-time) audio-video technology on 4/18/2022 for Medication Refill        Assessment & Plan:   Diagnoses and all orders for this visit:    1. Attention deficit hyperactivity disorder (ADHD), combined type  Symptoms controlled   review is without irregularities  Urine compliance screening up to date  Continue rx  Face to face visit required in 3 months for add'l refills  -     dextroamphetamine-amphetamine (ADDERALL) 20 mg tablet; Take 1 Tablet by mouth two (2) times a day. Max Daily Amount: 40 mg.  -     dextroamphetamine-amphetamine (ADDERALL) 20 mg tablet; Take 1 Tablet by mouth two (2) times a day. - must last 30 days  -     dextroamphetamine-amphetamine (ADDERALL) 20 mg tablet; Take 1 Tablet by mouth two (2) times a day. Max Daily Amount: 40 mg.    2. Restless leg syndrome  Patient reports increase in symptoms  Discussed trying alternatives to Mirapex such as gabapentin,pregabalin, requip, or Rotigotine  Patient wants to continue with Mirapex for now, will refill Rx  -     pramipexole (MIRAPEX) 0.5 mg tablet; TAKE 1 TABLET BY MOUTH EVERY NIGHT AT 7PM    3. Hypertension, essential  Last BP on file is from [address] [place] [address] Street on file are from March 2021  Control unknown  Continue metoprolol XL at current dose  Next visit needs to be in office for blood pressure assessment and labs  Low-sodium diet recommended  Weight loss recommended  150 minutes of exercise weekly recommended  -     metoprolol succinate (TOPROL-XL) 25 mg XL tablet; TAKE ONE TABLET BY MOUTH DAILY        Follow-up and Dispositions    · Return in about 3 months (around 7/18/2022) for ADHD, blood pressure, urine compl screening- in office appt. I have discussed the diagnosis with the patient and the intended plan as seen in the above orders, and questions were answered concerning future plans. Patient conveyed understanding of the plan at the time of the visit.     712  Subjective: HPI:  Presents for follow-up of ADD, restless leg syndrome, and hypertension. f/u of ADD:  Doing well, reports symptoms well-controlled on current dose adderall, good compliance, tolerating well without apparent side effects. Denies chest pain, dyspnea on exertion, headache, blurred vision, tremor, appetite change, or insomnia. Last urine compliance screening: august 2021    Feels Mirapex is not working as well as it has in the past for her restless leg syndrome, symptoms have been worsening. Reports good compliance, tolerating well,, no adverse symptoms. Reports a sleep specialist told her that symptoms can get worse with anemia. She has not been anemic in the past and denies blood in the stool or heavy periods, other blood loss. Diet and Lifestyle: generally follows a low fat low cholesterol diet, generally follows a low sodium diet, exercises sporadically, nonsmoker  Home BP Monitoring: is not measured at home    Cardiovascular ROS: taking medications as instructed, no medication side effects noted, no TIA's, no chest pain on exertion, no dyspnea on exertion, no swelling of ankles, no orthostatic dizziness or lightheadedness, no orthopnea or paroxysmal nocturnal dyspnea, no palpitations, no intermittent claudication symptoms. Prior to Admission medications    Medication Sig Start Date End Date Taking? Authorizing Provider   dextroamphetamine-amphetamine (ADDERALL) 20 mg tablet Take 1 Tablet by mouth two (2) times a day. Max Daily Amount: 40 mg. 6/13/22  Yes Vinnie Tim NP   dextroamphetamine-amphetamine (ADDERALL) 20 mg tablet Take 1 Tablet by mouth two (2) times a day. - must last 30 days 5/16/22  Yes Vinnie Tim NP   pramipexole (MIRAPEX) 0.5 mg tablet TAKE 1 TABLET BY MOUTH EVERY NIGHT AT 7PM 4/18/22  Yes Vinnie Tim NP   dextroamphetamine-amphetamine (ADDERALL) 20 mg tablet Take 1 Tablet by mouth two (2) times a day.  Max Daily Amount: 40 mg. 4/18/22  Yes Solomon Decker Yomi Wing NP   metoprolol succinate (TOPROL-XL) 25 mg XL tablet TAKE ONE TABLET BY MOUTH DAILY 4/18/22  Yes Yomi Tim NP   levonorgestrel-ethinyl estradiol (AVIANE, ALESSE, LESSINA) 0.1-20 mg-mcg tab Take 1 Tablet by mouth daily. 10/25/21  Yes Vinnie Tim NP   dextroamphetamine-amphetamine (ADDERALL) 20 mg tablet Take 1 Tablet by mouth two (2) times a day. Max Daily Amount: 40 mg. 12/23/21 4/18/22  Francoise Hansen NP   dextroamphetamine-amphetamine (ADDERALL) 20 mg tablet Take 1 Tablet by mouth two (2) times a day. - must last 30 days 1/10/22 4/18/22  Francoise Hansen NP   pramipexole (MIRAPEX) 0.5 mg tablet TAKE 1 TABLET BY MOUTH EVERY NIGHT AT [facility] 10/25/21 4/18/22  Yomi Tim NP   dextroamphetamine-amphetamine (ADDERALL) 20 mg tablet Take 1 Tablet by mouth two (2) times a day.  Max Daily Amount: 40 mg. 10/25/21 4/18/22  Francoise Hansen NP   metoprolol succinate (TOPROL-XL) 25 mg XL tablet TAKE ONE TABLET BY MOUTH DAILY 10/25/21 4/18/22  Francoise Hansen NP   budesonide-formoteroL (SYMBICORT) 160-4.5 mcg/actuation HFAA INHALE 2 PUFFS BY MOUTH TWICE DAILY  Patient not taking: Reported on 4/18/2022 9/2/20   Geraline Overall, NP     Patient Active Problem List   Diagnosis Code    Anxiety associated with depression F41.8    Headache(784.0) R51    Pseudotumor cerebri G93.2    Asthma J45.909    Other bipolar disorders F31.89    Somatization disorder F45.0    Anxiety F41.9    Attention deficit hyperactivity disorder (ADHD), combined type F90.2    Chest pain R07.9    Obesity, morbid ([address] E66.01     Allergies   Allergen Reactions    Diclofenac Nausea Only    Sulfa (Sulfonamide Antibiotics) Hives and Other (comments)     gas     Past Medical History:   Diagnosis Date    ADHD     Asthma     Essential hypertension     last pregnancy; current pregnancy    Ill-defined condition     pseudo-tumor cerebri    Iron deficiency anemia     Other ill-defined conditions(799.89) pseudo tumor cerebrae    Seizure [facility])      Past Surgical History:   Procedure Laterality Date    HX DILATION AND CURETTAGE      HX GYN       , ,    HX TUBAL LIGATION      DC  DELIVERY ONLY      DC  DELIVERY ONLY       Family History   Problem Relation Age of Onset    Lung Disease Maternal Grandmother     No Known Problems Mother     Hypertension Father      Social History     Tobacco Use    Smoking status: Former Smoker     Packs/day: 0.50     Years: 0.50     Pack years: 0.25     Types: Cigarettes     Quit date: 2015     Years since quittin.2    Smokeless tobacco: Never Used    Tobacco comment: socially   Substance Use Topics    Alcohol use: Yes     Alcohol/week: 0.0 standard drinks     Comment: socially       Review of Systems   Constitutional: Negative for chills, fever, malaise/fatigue and weight loss. HENT: Negative. Eyes: Negative. Respiratory: Negative. Cardiovascular: Negative. Gastrointestinal: Negative. Genitourinary: Negative. Musculoskeletal: Negative. Skin: Negative. Neurological: Negative. Endo/Heme/Allergies: Negative. Psychiatric/Behavioral: Negative. Objective:     Patient-Reported Vitals 3/3/2021   Patient-Reported Weight 190lb      General: alert, cooperative, no distress   Mental  status: normal mood, behavior, speech, dress, motor activity, and thought processes, able to follow commands   HENT: NCAT   Neck: no visualized mass   Resp: no respiratory distress   Neuro: no gross deficits   Skin: no discoloration or lesions of concern on visible areas   Psychiatric: normal affect, consistent with stated mood, no evidence of hallucinations     Additional exam findings:   none    We discussed the expected course, resolution and complications of the diagnosis(es) in detail. Medication risks, benefits, costs, interactions, and alternatives were discussed as indicated.   I advised her to contact the office if her condition worsens, changes or fails to improve as anticipated. She expressed understanding with the diagnosis(es) and plan. Pj Almazan, was evaluated through a synchronous (real-time) audio-video encounter. The patient (or guardian if applicable) is aware that this is a billable service, which includes applicable co-pays. Verbal consent to proceed has been obtained. The visit was conducted pursuant to the emergency declaration under the [address] [place] [address], [address] [place] [address] authority and the Dr. TATTOFF and [employer] General Act. Patient identification was verified, and a caregiver was present when appropriate. The patient was located at home in a state where the provider was licensed to provide care.     Celio Toledo NP  4/18/2022

## 2022-04-18 NOTE — PATIENT INSTRUCTIONS
Attention Deficit Hyperactivity Disorder (ADHD) in Adults: Care Instructions  Your Care Instructions     Attention deficit hyperactivity disorder, or ADHD, is a condition that makes it hard to pay attention. So you may have problems when you try to focus, get organized, and finish tasks. It might make you more active than other people. Or you might do things without thinking first.  ADHD is very common. It usually starts in early childhood. Many adults don't realize they have it until their children are diagnosed. Then they become aware of their own symptoms. Doctors don't know what causes ADHD. But it often runs in families. ADHD can be treated with medicines, behavior training, and counseling. Treatment can improve your life. Follow-up care is a key part of your treatment and safety. Be sure to make and go to all appointments, and call your doctor if you are having problems. It's also a good idea to know your test results and keep a list of the medicines you take. How can you care for yourself at home? · Learn all you can about ADHD. This will help you and your family understand it better. · Take your medicines exactly as prescribed. Call your doctor if you think you are having a problem with your medicine. You will get more details on the specific medicines your doctor prescribes. · If you miss a dose of your medicine, do not take an extra dose. · If your doctor suggests counseling, find a counselor you like and trust. Talk openly and honestly. Be willing to make some changes. · Find a support group for adults with ADHD. Talking to others with the same problems can help you feel better. It can also give you ideas about how to best cope with the condition. · Get rid of distractions at your work space. Keep your desk clean. Try not to face a window or busy hallway. · Use files, planners, and other tools to keep you organized. · Limit use of alcohol, and do not use illegal drugs.  People with ADHD tend to develop substance use disorder more easily than others. Tell your doctor if you need help to quit. Counseling, support groups, and sometimes medicines can help you stay free of alcohol or drugs. · Get at least 30 minutes of physical activity on most days of the week. Exercise has been shown to help people cope with ADHD. Walking is a good choice. You also may want to do other activities, such as running, swimming, cycling, or playing tennis or team sports. When should you call for help? Watch closely for changes in your health, and be sure to contact your doctor if:    · You feel sad a lot or cry all the time.     · You have trouble sleeping, or you sleep too much.     · You find it hard to concentrate, make decisions, or remember things.     · You change how you normally eat.     · You feel guilty for no reason. Where can you learn more? Go to http://www.costa.com/  Enter B196 in the search box to learn more about \"Attention Deficit Hyperactivity Disorder (ADHD) in Adults: Care Instructions. \"  Current as of: June 16, 2021               Content Version: 13.2  © 1743-5842 [employer]. Care instructions adapted under license by [employer] (which disclaims liability or warranty for this information). If you have questions about a medical condition or this instruction, always ask your healthcare professional. [place] [address] any warranty or liability for your use of this information.

## 2022-06-29 ENCOUNTER — TELEPHONE (OUTPATIENT)
Dept: FAMILY MEDICINE CLINIC | Age: 32
End: 2022-06-29

## 2022-06-29 NOTE — TELEPHONE ENCOUNTER
Received call from patient.(Sierra) Patient states she needs med refill prior to leaving town to go out of country, ([place]) on 7/6/22. Patient requesting VV or refill appointment.

## 2022-06-29 NOTE — TELEPHONE ENCOUNTER
Spoke to pt. Patient x2 id verified. VV schedule on 7/1/22. Pt will come in the morning to give urine sample in office.

## 2022-07-01 ENCOUNTER — VIRTUAL VISIT (OUTPATIENT)
Dept: FAMILY MEDICINE CLINIC | Age: 32
End: 2022-07-01
Payer: COMMERCIAL

## 2022-07-01 DIAGNOSIS — Z79.899 ON STIMULANT MEDICATION: ICD-10-CM

## 2022-07-01 DIAGNOSIS — F90.2 ATTENTION DEFICIT HYPERACTIVITY DISORDER (ADHD), COMBINED TYPE: Primary | ICD-10-CM

## 2022-07-01 DIAGNOSIS — I10 HYPERTENSION, ESSENTIAL: ICD-10-CM

## 2022-07-01 DIAGNOSIS — G25.81 RESTLESS LEG SYNDROME: ICD-10-CM

## 2022-07-01 LAB
AMPHETAMINE QL URINE POC: NORMAL
BARBITURATES UR POC: NEGATIVE
BENZODIAZEPINES UR POC: NEGATIVE
COCAINE QL URINE POC: NEGATIVE
LOT EXP DATE POC: NORMAL
LOT NUMBER POC: NORMAL
MARIJUANA (THC) QL URINE POC: NEGATIVE
MDMA/ECSTASY UR POC: NEGATIVE
METHADONE QL URINE POC: NORMAL
METHAMPHETAMINE QL URINE POC: NEGATIVE
NTI OTHER MICRO TRANSPORT 977598: NORMAL
OPIATES QL URINE POC: NEGATIVE
OXYCODONE UR POC: NEGATIVE
VALID INTERNAL CONTROL?: YES

## 2022-07-01 PROCEDURE — 99214 OFFICE O/P EST MOD 30 MIN: CPT | Performed by: NURSE PRACTITIONER

## 2022-07-01 PROCEDURE — 80305 DRUG TEST PRSMV DIR OPT OBS: CPT | Performed by: NURSE PRACTITIONER

## 2022-07-01 RX ORDER — METOPROLOL SUCCINATE 25 MG/1
TABLET, EXTENDED RELEASE ORAL
Qty: 90 TABLET | Refills: 1 | Status: SHIPPED | OUTPATIENT
Start: 2022-07-01 | End: 2022-10-05 | Stop reason: SDUPTHER

## 2022-07-01 RX ORDER — DEXTROAMPHETAMINE SACCHARATE, AMPHETAMINE ASPARTATE, DEXTROAMPHETAMINE SULFATE AND AMPHETAMINE SULFATE 5; 5; 5; 5 MG/1; MG/1; MG/1; MG/1
20 TABLET ORAL 2 TIMES DAILY
Qty: 60 TABLET | Refills: 0 | Status: SHIPPED | OUTPATIENT
Start: 2022-07-01 | End: 2022-10-05 | Stop reason: SDUPTHER

## 2022-07-01 RX ORDER — PRAMIPEXOLE DIHYDROCHLORIDE 0.5 MG/1
TABLET ORAL
Qty: 90 TABLET | Refills: 1 | Status: SHIPPED | OUTPATIENT
Start: 2022-07-01 | End: 2022-10-05 | Stop reason: SDUPTHER

## 2022-07-01 NOTE — PROGRESS NOTES
Ortiz Garcia is a 36 y.o. female who was seen by synchronous (real-time) audio-video technology on 7/1/2022 for Medication Refill        Assessment & Plan:   Diagnoses and all orders for this visit:    1. Attention deficit hyperactivity disorder (ADHD), combined type  2. On stimulant medication  Symptoms controlled   review is without irregularities  Urine compliance screening performed today, normal  Continue rx- last fill 6/24, [facility]. Will authorize early refill at pharmacy  Face to face visit required in 3 months for add'l refills  -     AMB POC [facility] ICUP DX DRUG SCREEN 10  -     dextroamphetamine-amphetamine (ADDERALL) 20 mg tablet; Take 1 Tablet by mouth two (2) times a day. Max Daily Amount: 40 mg. Please refill early (today), patient traveling outside of the country for extended period starting 7/7/22    3. Restless leg syndrome  Controlled  Continue rx  -     pramipexole (MIRAPEX) 0.5 mg tablet; TAKE 1 TABLET BY MOUTH EVERY NIGHT AT 7PM    4. Hypertension, essential  Controlled at last check (2020)  Will need in office visit for next follow up to assess pressure and check labs  Low sodium diet recommended  Weight loss recommended  150 exercise weekly recommended  -     metoprolol succinate (TOPROL-XL) 25 mg XL tablet; TAKE ONE TABLET BY MOUTH DAILY        Follow-up and Dispositions    · Return if symptoms worsen or fail to improve. I have discussed the diagnosis with the patient and the intended plan as seen in the above orders, and questions were answered concerning future plans. Patient conveyed understanding of the plan at the time of the visit. 712  Subjective:     HPI:    Presents for f/u of ADHD, htn, and restless leg syndrome. f/u of ADD:  Doing well, reports symptoms well-controlled on current dose adderall, good compliance, tolerating well without apparent side effects.  Denies chest pain, dyspnea on exertion, headache, blurred vision, tremor, appetite change, or insomnia. Patient needs early refill, will be leaving to visit family in [place] for 3-4 weeks starting on 22; last filled rx on 22. Last urine compliance screenin2022    Diet and Lifestyle: generally follows a low fat low cholesterol diet, generally follows a low sodium diet, exercises sporadically, nonsmoker  Home BP Monitoring: is not measured at home    Cardiovascular ROS: taking medications as instructed, no medication side effects noted, no TIA's, no chest pain on exertion, no dyspnea on exertion, no swelling of ankles, no orthostatic dizziness or lightheadedness, no orthopnea or paroxysmal nocturnal dyspnea, no palpitations, no intermittent claudication symptoms. Restless leg symptoms well controlled on current does pramipexole, tolerating well, no apparent side effects. Wants to continue current treatment plan. Prior to Admission medications    Medication Sig Start Date End Date Taking? Authorizing Provider   dextroamphetamine-amphetamine (ADDERALL) 20 mg tablet Take 1 Tablet by mouth two (2) times a day. Max Daily Amount: 40 mg. Please refill early (today), patient traveling outside of the country for extended period starting 22  Yes Vinnie Tim NP   pramipexole (MIRAPEX) 0.5 mg tablet TAKE 1 TABLET BY MOUTH EVERY NIGHT AT 7PM 22  Yes Vinnie Tim NP   metoprolol succinate (TOPROL-XL) 25 mg XL tablet TAKE ONE TABLET BY MOUTH DAILY 22  Yes Vninie Tim NP   dextroamphetamine-amphetamine (ADDERALL) 20 mg tablet Take 1 Tablet by mouth two (2) times a day. - must last 30 days 22  Yes Vinnie Tim NP   dextroamphetamine-amphetamine (ADDERALL) 20 mg tablet Take 1 Tablet by mouth two (2) times a day. Max Daily Amount: 40 mg. 22  Yes Claudia Tim NP   levonorgestrel-ethinyl estradiol (AVIANE, ALESSE, LESSINA) 0.1-20 mg-mcg tab Take 1 Tablet by mouth daily.  10/25/21  Yes Chuck Brown NP dextroamphetamine-amphetamine (ADDERALL) 20 mg tablet Take 1 Tablet by mouth two (2) times a day.  Max Daily Amount: 40 mg. 22  [facility], NP   pramipexole (MIRAPEX) 0.5 mg tablet TAKE 1 TABLET BY MOUTH EVERY NIGHT AT [facility] 22  [facility], NP   metoprolol succinate (TOPROL-XL) 25 mg XL tablet TAKE ONE TABLET BY MOUTH DAILY 22  [facility], NP   budesonide-formoteroL (SYMBICORT) 160-4.5 mcg/actuation HFAA INHALE 2 PUFFS BY MOUTH TWICE DAILY  Patient not taking: Reported on 2022   Tres Carrillo NP     Patient Active Problem List   Diagnosis Code    Anxiety associated with depression F41.8    Headache(784.0) R51    Pseudotumor cerebri G93.2    Asthma J45.909    Other bipolar disorders F31.89    Somatization disorder F45.0    Anxiety F41.9    Attention deficit hyperactivity disorder (ADHD), combined type F90.2    Chest pain R07.9    Obesity, morbid ([address] E66.01    Restless leg syndrome G25.81    Hypertension, essential I10     Allergies   Allergen Reactions    Diclofenac Nausea Only    Sulfa (Sulfonamide Antibiotics) Hives and Other (comments)     gas     Past Medical History:   Diagnosis Date    ADHD     Asthma     Essential hypertension     last pregnancy; current pregnancy    Ill-defined condition     pseudo-tumor cerebri    Iron deficiency anemia     Other ill-defined conditions(799.89)     pseudo tumor cerebrae    Seizure [facility])      Past Surgical History:   Procedure Laterality Date    HX DILATION AND CURETTAGE      HX GYN       , ,    HX TUBAL LIGATION      NH  DELIVERY ONLY      NH  DELIVERY ONLY       Family History   Problem Relation Age of Onset    Lung Disease Maternal Grandmother     No Known Problems Mother     Hypertension Father      Social History     Tobacco Use    Smoking status: Former Smoker     Packs/day: 0.50     Years: 0.50     Pack years: 0.25 Types: Cigarettes     Quit date: 2015     Years since quittin.5    Smokeless tobacco: Never Used    Tobacco comment: socially   Substance Use Topics    Alcohol use: Yes     Alcohol/week: 0.0 standard drinks     Comment: socially       Review of Systems   Constitutional: Negative for chills, fever, malaise/fatigue and weight loss. HENT: Negative. Eyes: Negative. Respiratory: Negative. Cardiovascular: Negative for chest pain, palpitations and leg swelling. Gastrointestinal: Negative. Genitourinary: Negative. Musculoskeletal: Negative. Skin: Negative. Neurological: Negative. Endo/Heme/Allergies: Negative. Psychiatric/Behavioral: Negative.         Objective:     Patient-Reported Vitals 3/3/2021   Patient-Reported Weight 190lb      General: alert, cooperative, no distress   Mental  status: normal mood, behavior, speech, dress, motor activity, and thought processes, able to follow commands   HENT: NCAT   Neck: no visualized mass   Resp: no respiratory distress   Neuro: no gross deficits   Skin: no discoloration or lesions of concern on visible areas   Psychiatric: normal affect, consistent with stated mood, no evidence of hallucinations     Additional exam findings:   None     Results for orders placed or performed in visit on 22   AMB POC [facility] ICUP DX DRUG SCREEN 10   Result Value Ref Range    LOT NUMBER [id number]     LOT EXP DATE POC 10,102,023     VALID INTERNAL CONTROL POC Yes     BENZODIAZEPINES UR POC Negative     BARBITURATES UR POC Negative     Methamphetamine urine , Ql. (POC) Negative     Opiates urine , Ql. (POC) Negative     OXYCODONE UR POC Negative     MDMA/ECSTASY UR POC Negative     NTI OTHER MICRO TRANSPORT n     Cocaine urine , Ql. (POC) Negative     Methadone urine , Ql. (POC) n     Marijuana(THC) urine , Ql. (POC) Negative     Amphetamine urine , Ql. (POC) Presumptive Positive          We discussed the expected course, resolution and complications of the diagnosis(es) in detail. Medication risks, benefits, costs, interactions, and alternatives were discussed as indicated. I advised her to contact the office if her condition worsens, changes or fails to improve as anticipated. She expressed understanding with the diagnosis(es) and plan. Pj Almazan, was evaluated through a synchronous (real-time) audio-video encounter. The patient (or guardian if applicable) is aware that this is a billable service, which includes applicable co-pays. This Virtual Visit was conducted with patient's (and/or legal guardian's) consent. The visit was conducted pursuant to the emergency declaration under the [facility], [address] [place] [address] authority and the [employer] and [employer] General Act. Patient identification was verified, and a caregiver was present when appropriate. The patient was located at: Home: [address]  The provider was located at:  Facility (Appt Department): [address] [place] [address] [address], [place]  7/1/2022

## 2022-07-05 ENCOUNTER — TELEPHONE (OUTPATIENT)
Dept: FAMILY MEDICINE CLINIC | Age: 32
End: 2022-07-05

## 2022-07-05 NOTE — TELEPHONE ENCOUNTER
Patient called and states that [facility] sent a message to the pharmacy to get her medication refilled early. She states that the pharmacy needs to speak to the provider to fill the Aderrall. Please call the pharmacy at [phone]. Patient states that she is leaving the country on 07/06/2022.  Please call patient when it is done at [phone]

## 2022-07-05 NOTE — TELEPHONE ENCOUNTER
Spoke to Troy Peoples at [employer] Patient x2 id verified. Verbal order given for early refill per Christa Spurling. She verbalized understanding.

## 2022-07-05 NOTE — TELEPHONE ENCOUNTER
I tried to call the pharmacy to provide this but was placed on hold for five minutes , I could not continue to hold. I did tell the patient that she could have an early refill on this and sent that as a comment on the electronic rx I sent at her appt last week Due to her upcoming travel outside of the country. . Please call the pharmacy and let them know that I have authorized early refill on the Adderall this month. If they will not acc ept your verbal confirmation, please offer to fax them a copy of the transcript of this telephone encounter.

## 2022-10-05 ENCOUNTER — OFFICE VISIT (OUTPATIENT)
Dept: FAMILY MEDICINE CLINIC | Age: 32
End: 2022-10-05
Payer: COMMERCIAL

## 2022-10-05 VITALS
RESPIRATION RATE: 16 BRPM | OXYGEN SATURATION: 100 % | BODY MASS INDEX: 33.12 KG/M2 | HEIGHT: 67 IN | HEART RATE: 82 BPM | DIASTOLIC BLOOD PRESSURE: 79 MMHG | TEMPERATURE: 98.6 F | SYSTOLIC BLOOD PRESSURE: 125 MMHG | WEIGHT: 211 LBS

## 2022-10-05 DIAGNOSIS — I10 HYPERTENSION, ESSENTIAL: Primary | ICD-10-CM

## 2022-10-05 DIAGNOSIS — M54.50 LUMBAR PAIN: ICD-10-CM

## 2022-10-05 DIAGNOSIS — G93.2 PSEUDOTUMOR CEREBRI: ICD-10-CM

## 2022-10-05 DIAGNOSIS — F90.2 ATTENTION DEFICIT HYPERACTIVITY DISORDER (ADHD), COMBINED TYPE: ICD-10-CM

## 2022-10-05 DIAGNOSIS — G25.81 RESTLESS LEG SYNDROME: ICD-10-CM

## 2022-10-05 PROCEDURE — 99214 OFFICE O/P EST MOD 30 MIN: CPT | Performed by: FAMILY MEDICINE

## 2022-10-05 RX ORDER — DEXTROAMPHETAMINE SACCHARATE, AMPHETAMINE ASPARTATE, DEXTROAMPHETAMINE SULFATE AND AMPHETAMINE SULFATE 5; 5; 5; 5 MG/1; MG/1; MG/1; MG/1
20 TABLET ORAL 2 TIMES DAILY
Qty: 60 TABLET | Refills: 0 | Status: CANCELLED | OUTPATIENT
Start: 2022-10-05

## 2022-10-05 RX ORDER — METOPROLOL SUCCINATE 25 MG/1
TABLET, EXTENDED RELEASE ORAL
Qty: 90 TABLET | Refills: 1 | Status: SHIPPED | OUTPATIENT
Start: 2022-10-05

## 2022-10-05 RX ORDER — BUDESONIDE AND FORMOTEROL FUMARATE DIHYDRATE 160; 4.5 UG/1; UG/1
AEROSOL RESPIRATORY (INHALATION)
Qty: 1 EACH | Refills: 5 | Status: SHIPPED | OUTPATIENT
Start: 2022-10-05

## 2022-10-05 RX ORDER — DEXTROAMPHETAMINE SACCHARATE, AMPHETAMINE ASPARTATE, DEXTROAMPHETAMINE SULFATE AND AMPHETAMINE SULFATE 5; 5; 5; 5 MG/1; MG/1; MG/1; MG/1
20 TABLET ORAL 2 TIMES DAILY
Qty: 60 TABLET | Refills: 0 | Status: SHIPPED | OUTPATIENT
Start: 2022-12-04 | End: 2022-10-27

## 2022-10-05 RX ORDER — DEXTROAMPHETAMINE SACCHARATE, AMPHETAMINE ASPARTATE, DEXTROAMPHETAMINE SULFATE AND AMPHETAMINE SULFATE 5; 5; 5; 5 MG/1; MG/1; MG/1; MG/1
20 TABLET ORAL 2 TIMES DAILY
Qty: 60 TABLET | Refills: 0 | Status: SHIPPED | OUTPATIENT
Start: 2022-10-05

## 2022-10-05 RX ORDER — PRAMIPEXOLE DIHYDROCHLORIDE 0.5 MG/1
TABLET ORAL
Qty: 90 TABLET | Refills: 1 | Status: SHIPPED | OUTPATIENT
Start: 2022-10-05

## 2022-10-05 RX ORDER — DEXTROAMPHETAMINE SACCHARATE, AMPHETAMINE ASPARTATE, DEXTROAMPHETAMINE SULFATE AND AMPHETAMINE SULFATE 5; 5; 5; 5 MG/1; MG/1; MG/1; MG/1
20 TABLET ORAL 2 TIMES DAILY
Qty: 60 TABLET | Refills: 0 | Status: SHIPPED | OUTPATIENT
Start: 2022-11-03 | End: 2022-10-27

## 2022-10-05 NOTE — PROGRESS NOTES
Chief Complaint   Patient presents with    Anxiety     Ran out of RX: Panic attacks,  palpitations    Memory Loss    Back Pain     Following up with Ortho    Medication Refill     1. Have you been to the ER, urgent care clinic since your last visit? Hospitalized since your last visit? Yes Where: Drs in [facility]    2. Have you seen or consulted any other health care providers outside of the [address] [place] [address] since your last visit? Include any pap smears or colon screening.  No

## 2022-10-05 NOTE — PROGRESS NOTES
Chief Complaint   Patient presents with    Anxiety     Ran out of RX: Panic attacks,  palpitations    Memory Loss    Back Pain     Following up with Ortho    Medication Refill     1. Have you been to the ER, urgent care clinic since your last visit? Hospitalized since your last visit? Yes Where: Drs in [facility]    2. Have you seen or consulted any other health care providers outside of the [address] [place] [address] since your last visit? Include any pap smears or colon screening. No           Chief Complaint   Patient presents with    Anxiety     Ran out of RX: Panic attacks,  palpitations    Memory Loss    Back Pain     Following up with Ortho    Medication Refill     She is a 29 y.o. female who presents for evalution. Reviewed PmHx, RxHx, FmHx, SocHx, AllgHx and updated and dated in the chart. Patient Active Problem List    Diagnosis    Restless leg syndrome    Hypertension, essential    Obesity, morbid (HCC)    Chest pain    Other bipolar disorders    Somatization disorder    Anxiety    Attention deficit hyperactivity disorder (ADHD), combined type    Asthma    Pseudotumor cerebri    Headache(784.0)    Anxiety associated with depression       Review of Systems - negative except as listed above in the HPI    Objective:     Vitals:    10/05/22 1009   BP: 125/79   Pulse: 82   Resp: 16   Temp: 98.6 °F (37 °C)   TempSrc: Oral   SpO2: 100%   Weight: 211 lb (95.7 kg)   Height: 5' 7\" (1.702 m)         Assessment/ Plan:   Diagnoses and all orders for this visit:    1. Hypertension, essential  -     metoprolol succinate (TOPROL-XL) 25 mg XL tablet; TAKE ONE TABLET BY MOUTH DAILY  -at goal    2. Restless leg syndrome  -     pramipexole (MIRAPEX) 0.5 mg tablet; TAKE 1 TABLET BY MOUTH EVERY NIGHT AT 7PM  -restart rx    3. Attention deficit hyperactivity disorder (ADHD), combined type  -     dextroamphetamine-amphetamine (ADDERALL) 20 mg tablet; Take 1 Tablet by mouth two (2) times a day.  Max Daily Amount: 40 mg.  - dextroamphetamine-amphetamine (ADDERALL) 20 mg tablet; Take 1 Tablet by mouth two (2) times a day. - must last 30 days  -     dextroamphetamine-amphetamine (ADDERALL) 20 mg tablet; Take 1 Tablet by mouth two (2) times a day. Max Daily Amount: 40 mg.    4. Pseudotumor cerebri  -stable    5. Lumbar pain  -seeing ortho    Other orders  -     budesonide-formoteroL (SYMBICORT) 160-4.5 mcg/actuation HFAA; INHALE 2 PUFFS BY MOUTH TWICE DAILY             I have discussed the diagnosis with the patient and the intended plan as seen in the above orders. The patient understands and agrees with the plan. The patient has received an after-visit summary and questions were answered concerning future plans. Medication Side Effects and Warnings were discussed with patient  Patient Labs were reviewed and or requested:  Patient Past Records were reviewed and or requested    Emile Langford M.D. There are no Patient Instructions on file for this visit.

## 2022-10-06 ENCOUNTER — OFFICE VISIT (OUTPATIENT)
Dept: FAMILY MEDICINE CLINIC | Age: 32
End: 2022-10-06
Payer: COMMERCIAL

## 2022-10-06 DIAGNOSIS — Z51.81 MEDICATION MONITORING ENCOUNTER: Primary | ICD-10-CM

## 2022-10-06 LAB
AMPHETAMINE QL URINE POC: NEGATIVE
BARBITURATES UR POC: NEGATIVE
BENZODIAZEPINES UR POC: NEGATIVE
COCAINE QL URINE POC: NEGATIVE
LOT EXP DATE POC: NORMAL
LOT NUMBER POC: NORMAL
MARIJUANA (THC) QL URINE POC: NEGATIVE
MDMA/ECSTASY UR POC: NEGATIVE
METHADONE QL URINE POC: NEGATIVE
METHAMPHETAMINE QL URINE POC: NEGATIVE
NTI OTHER MICRO TRANSPORT 977598: NEGATIVE
OPIATES QL URINE POC: NEGATIVE
OXYCODONE UR POC: NEGATIVE
TRICYCLIC ANTIDEPRESSANTS (TCA) QL URINE POC: NORMAL
VALID INTERNAL CONTROL?: YES

## 2022-10-06 PROCEDURE — 80305 DRUG TEST PRSMV DIR OPT OBS: CPT | Performed by: NURSE PRACTITIONER

## 2022-10-13 ENCOUNTER — TELEPHONE (OUTPATIENT)
Dept: FAMILY MEDICINE CLINIC | Age: 32
End: 2022-10-13

## 2022-10-13 NOTE — TELEPHONE ENCOUNTER
Patient's mother, Ranjan Sun, on [place] calling stating patient was seen at [facility] er last night and discharged this morning. They are stating she needs to be seen asap for hypothyroidism and needs to start on medication. She was scheduled for first available hospital follow up on 10/20, but they are requesting a call to discuss further.      Pt CB # [phone]

## 2022-10-17 ENCOUNTER — DOCUMENTATION ONLY (OUTPATIENT)
Dept: FAMILY MEDICINE CLINIC | Age: 32
End: 2022-10-17

## 2022-10-20 ENCOUNTER — OFFICE VISIT (OUTPATIENT)
Dept: FAMILY MEDICINE CLINIC | Age: 32
End: 2022-10-20
Payer: COMMERCIAL

## 2022-10-20 VITALS
SYSTOLIC BLOOD PRESSURE: 122 MMHG | OXYGEN SATURATION: 98 % | HEIGHT: 67 IN | RESPIRATION RATE: 16 BRPM | DIASTOLIC BLOOD PRESSURE: 84 MMHG | BODY MASS INDEX: 32.18 KG/M2 | WEIGHT: 205 LBS | HEART RATE: 77 BPM

## 2022-10-20 DIAGNOSIS — F41.8 ANXIETY ASSOCIATED WITH DEPRESSION: ICD-10-CM

## 2022-10-20 DIAGNOSIS — I10 HYPERTENSION, ESSENTIAL: ICD-10-CM

## 2022-10-20 DIAGNOSIS — E03.9 HYPOTHYROIDISM, UNSPECIFIED TYPE: Primary | ICD-10-CM

## 2022-10-20 PROCEDURE — 99213 OFFICE O/P EST LOW 20 MIN: CPT | Performed by: NURSE PRACTITIONER

## 2022-10-20 NOTE — PROGRESS NOTES
Verified name and birth date for privacy precautions. Chart reviewed in preparation for today's visit. Chief Complaint   Patient presents with    Hospital Follow Up     [facility] last week for feeling faint and dizziness. Not admitted. Advised thyroid was low - labs scanned in [facility]. Health Maintenance Due   Topic    Hepatitis C Screening     COVID-19 Vaccine (1)    Pneumococcal 0-64 years (1 - PCV)    Flu Vaccine (1)    Cervical cancer screen          Wt Readings from Last 3 Encounters:   10/20/22 205 lb (93 kg)   10/05/22 211 lb (95.7 kg)   11/03/20 201 lb 1 oz (91.2 kg)     Temp Readings from Last 3 Encounters:   10/05/22 98.6 °F (37 °C) (Oral)   11/03/20 98 °F (36.7 °C)   08/03/20 98 °F (36.7 °C)     BP Readings from Last 3 Encounters:   10/20/22 122/84   10/05/22 125/79   11/03/20 134/80     Pulse Readings from Last 3 Encounters:   10/20/22 77   10/05/22 82   11/03/20 67         Learning Assessment:  :     Learning Assessment 3/30/2018 12/6/2017 11/2/2016 11/25/2015 6/8/2015 3/23/2015   PRIMARY LEARNER Patient Patient Patient Patient Patient Patient   HIGHEST LEVEL OF EDUCATION - PRIMARY LEARNER  SOME COLLEGE - - - - -   BARRIERS PRIMARY LEARNER NONE - - - - -   CO-LEARNER CAREGIVER No - - - - -   PRIMARY [facility]   LEARNER PREFERENCE PRIMARY DEMONSTRATION DEMONSTRATION DEMONSTRATION LISTENING READING DEMONSTRATION   LEARNING SPECIAL TOPICS no - - - - -   ANSWERED BY pt patient patient patient patient patient   RELATIONSHIP SELF SELF SELF SELF SELF SELF       Depression Screening:  :     3 most recent PHQ Screens 10/20/2022   PHQ Not Done -   Little interest or pleasure in doing things Not at all   Feeling down, depressed, irritable, or hopeless Not at all   Total Score PHQ 2 0       Fall Risk Assessment:  :     No flowsheet data found.     Abuse Screening:  :     Abuse Screening Questionnaire 10/20/2022 5/19/2021 8/28/2019 6/18/2018 12/6/2017 Do you ever feel afraid of your partner? N N N N N   Are you in a relationship with someone who physically or mentally threatens you? N N N N N   Is it safe for you to go home? Y Y Y Y Y       Coordination of Care Questionnaire:  :     1) Have you been to an emergency room, urgent care clinic since your last visit? Yes   Hospitalized since your last visit? no             2) Have you seen or consulted any other health care providers outside of [address] [place] [address] since your last visit?  yes; Cardiology at [address] [place] [address]  (Include any pap smears or colon screenings in this section.)    3) Do you have an Advance Directive on file? no      Patient is currently accompanied by her self.     ------------------------------------------------

## 2022-10-21 ENCOUNTER — TELEPHONE (OUTPATIENT)
Dept: CARDIOLOGY CLINIC | Age: 32
End: 2022-10-21

## 2022-10-21 ENCOUNTER — OFFICE VISIT (OUTPATIENT)
Dept: CARDIOLOGY CLINIC | Age: 32
End: 2022-10-21
Payer: COMMERCIAL

## 2022-10-21 VITALS
SYSTOLIC BLOOD PRESSURE: 140 MMHG | DIASTOLIC BLOOD PRESSURE: 80 MMHG | HEIGHT: 67 IN | BODY MASS INDEX: 32.1 KG/M2 | WEIGHT: 204.5 LBS | HEART RATE: 80 BPM | OXYGEN SATURATION: 97 %

## 2022-10-21 DIAGNOSIS — R07.9 CHEST PAIN, UNSPECIFIED TYPE: Primary | ICD-10-CM

## 2022-10-21 DIAGNOSIS — R00.2 HEART PALPITATIONS: ICD-10-CM

## 2022-10-21 DIAGNOSIS — I10 HYPERTENSION, ESSENTIAL: ICD-10-CM

## 2022-10-21 PROCEDURE — 93000 ELECTROCARDIOGRAM COMPLETE: CPT | Performed by: INTERNAL MEDICINE

## 2022-10-21 PROCEDURE — 99214 OFFICE O/P EST MOD 30 MIN: CPT | Performed by: INTERNAL MEDICINE

## 2022-10-21 PROCEDURE — 3074F SYST BP LT 130 MM HG: CPT | Performed by: INTERNAL MEDICINE

## 2022-10-21 PROCEDURE — 3078F DIAST BP <80 MM HG: CPT | Performed by: INTERNAL MEDICINE

## 2022-10-21 NOTE — PROGRESS NOTES
Jean-Claude Mancini MD., [facility]    [address] [place], [address] [facility]    Office [phone],F [phone]           Shameka Rojas is here for a f/u office visit. Primary care physician:  Pablo Becerra NP    CC - as documented in EMR    Dear Dr. Pablo Becerra NP    I had the pleasure of seeing Dominic Ovalle in the office today. Assessment:     Palpitations  HTN  ADHD  Asthma        Plan:      Elevated BP -yesterday at PCPs office-systolic blood pressure in the 120s  14-day Holter monitor. Echocardiogram.  Continue metoprolol  Aggressive cardiovascular risk factor modification  Follow-up in 3 to 4 weeks/earlier as needed        Patient understands the plan. All questions were answered to the patient's satisfaction. I appreciate the opportunity to be involved in Ms. Torres. See note below for details. Please do not hesitate to contact us with questions or concerns. Jean-Claude Mancini MD      Cardiac Testing/ Procedures: A. Cardiac Cath/PCI:    B.ECHO/YANDY: 9/18/20    LV: Calculated LVEF is 62%. Normal cavity size, wall thickness, systolic function (ejection fraction normal) and diastolic function. Wall motion: normal.  IVC: Moderately elevated central venous pressure (10-15 mmHg); IVC diameter is larger than 21 mm and collapses more than 50% with respiration. C.StressNuclear/Stress ECHO/Stress test: 9/18/20 - 11min/neg stress test  D. Vascular:    E. EP:    F. Miscellaneous:Echo (12/11/2015): LVEF 55-60%, no RWMA. No significant structural abnormality noted. Treadmill stress test (12/11/2015): Negative. Holter (11/25/2015): HR  bpm.  Longest episode of tachycardia 2 minutes. 2.91% supraventricular ectopic beats, rare PVCs. Rare episodes of ectopic atrial rhythms. Frequent PACs, some bigeminy & trigeminy. Long RP tachycardia       History:     Shameka Rojas is a 29 y.o. female who returns for follow up visit.     History of intermittent palpitations. History of dizziness. No syncope. No chest pain/dyspnea. No swelling lower extremities. Patient has been in [place] for the past few months. In August, she had a severe GI infection over there which prompted hospital admission requiring medication/antibiotics. She also missed a few doses of metoprolol because she ran out of the medication. On Adderall for ADHD    Also has been noted to have elevated thyroid function test and is getting evaluated. Past medical history-asthma, essential hypertension with pregnancy, pseudotumor cerebri, depression, anxiety, iron deficiency anemia and seizures    Patient is planning to go back to [facility] in late November/December ('s family is from there)    ROS:  (bold if positive, if negative)           Medications:       Current Outpatient Medications   Medication Sig Dispense    budesonide-formoteroL (SYMBICORT) 160-4.5 mcg/actuation HFAA INHALE 2 PUFFS BY MOUTH TWICE DAILY 1 Each    metoprolol succinate (TOPROL-XL) 25 mg XL tablet TAKE ONE TABLET BY MOUTH DAILY 90 Tablet    dextroamphetamine-amphetamine (ADDERALL) 20 mg tablet Take 1 Tablet by mouth two (2) times a day. Max Daily Amount: 40 mg. 60 Tablet    pramipexole (MIRAPEX) 0.5 mg tablet TAKE 1 TABLET BY MOUTH EVERY NIGHT AT 7PM 90 Tablet    [START ON 12/4/2022] dextroamphetamine-amphetamine (ADDERALL) 20 mg tablet Take 1 Tablet by mouth two (2) times a day. - must last 30 days (Patient not taking: Reported on 10/20/2022) 60 Tablet    [START ON 11/3/2022] dextroamphetamine-amphetamine (ADDERALL) 20 mg tablet Take 1 Tablet by mouth two (2) times a day. Max Daily Amount: 40 mg. (Patient not taking: Reported on 10/20/2022) 60 Tablet    levonorgestrel-ethinyl estradiol (AVIANE, ALESSE, LESSINA) 0.1-20 mg-mcg tab Take 1 Tablet by mouth daily. (Patient not taking: Reported on 10/20/2022) 3 Dose Pack     No current facility-administered medications for this visit.            Family History of CAD:    No    Social History:  Current  Smoker  No    Physical Exam:     Visit Vitals  BP (!) 140/80 (BP 1 Location: Right arm)   Pulse 80   Ht 5' 7\" (1.702 m)   Wt 204 lb 8 oz (92.8 kg)   SpO2 97%   BMI 32.03 kg/m²          Gen: Well-developed, well-nourished, in no acute distress  Neck: Supple,No JVD, No Carotid Bruit,   Resp: No accessory muscle use, Clear breath sounds, No rales or rhonchi  Card: Regular Rate,Rythm,Normal S1, S2, No murmurs, rubs or gallop. No thrills. Abd:  Soft, BS+,   MSK: No cyanosis  Skin: No rashes    Neuro: moving all four extremities , follows commands appropriately  Psych:  Good insight, oriented to person, place , alert, Nml Affect  LE: No edema    EKG:       Medication Side Effects and Warnings were discussed with patient: yes  Patient Labs were reviewed and/or requested:  yes  Patient Past Records were reviewed and/or requested: yes    Total time :        mins    ATTENTION:   This medical record was transcribed using an electronic medical records/speech recognition system. Although proofread, it may and can contain electronic, spelling and other errors. Corrections may be executed at a later time. Please feel free to contact us for any clarifications as needed.       Estella Patel MD

## 2022-10-21 NOTE — PROGRESS NOTES
Chief Complaint   Patient presents with    Follow-up     Dr. Penny Thakkar     Chest Pain       Vitals:    10/21/22 1457 10/21/22 1516   BP: (!) 142/80 (!) 140/80   BP 1 Location: Left arm Right arm   Pulse: 80    Height: 5' 7\" (1.702 m)    Weight: 204 lb 8 oz (92.8 kg)    SpO2: 97%          Chest pain: L side of chest shock pain. Random     SOB: active     Palpitations: Random. Racing     Dizziness: Random- not currently     Swelling: no    Refills:       Have you been to the ER, urgent care clinic since your last visit? Hospitalized since your last visit? no    Have you sen or consulted other health care providers outside of the [facility] system since your last visit?  (Include any pap smears or colon screening.)      Was in [place] for [facility] in [facility] of November   Has seen Dr. Gissel Vanegas

## 2022-10-21 NOTE — LETTER
10/27/2022    Patient: Tucker Haider   YOB: 1990   Date of Visit: 10/21/2022     Naye Barth NP  [address] [facility] [address] [id number]  [address] [facility] [address]    Dear Naye Barth NP,      Thank you for referring Ms. Tucker Haider to [address]  for evaluation. My notes for this consultation are attached. If you have questions, please do not hesitate to call me. I look forward to following your patient along with you.       Sincerely,    Savi Moseley MD

## 2022-10-21 NOTE — TELEPHONE ENCOUNTER
Enrolled with [employer] - Ordered and being shipped to patient's home address on file. ETA within 5-7 business days. 14 Day Monitor Please  Received: Today  MENDEL Castro;  Betsy Frias  14 day monitor per Dr. Tee Section for heart palpitations and dizziness

## 2022-10-22 LAB — TSH SERPL DL<=0.05 MIU/L-ACNC: 1.46 UIU/ML (ref 0.36–3.74)

## 2022-10-23 LAB
T3 SERPL-MCNC: 149 NG/DL (ref 71–180)
THYROPEROXIDASE AB SERPL-ACNC: 11 IU/ML (ref 0–34)

## 2022-10-24 RX ORDER — TRAZODONE HYDROCHLORIDE 100 MG/1
100 TABLET ORAL
Qty: 30 TABLET | Refills: 1 | Status: SHIPPED | OUTPATIENT
Start: 2022-10-24

## 2022-10-24 NOTE — PROGRESS NOTES
Your thyroid labs are all completely normal. There is no need for further evaluation.  Lizeth Zaragoza

## 2022-10-25 NOTE — PROGRESS NOTES
HISTORY OF PRESENT ILLNESS  Isidro Reese is a 29 y.o. female. HPI  Patient is here for follow up ER visit  Has been living in [place] with new  trying to get him a Visa  Had severe anxiety about her living situation, ran out of meds while she was there, home sick as well  Came home and seen in [place] it got so bad  Labs showed ? Slow thyroid  Wants further work up  Still feeling episodes of light headed and dizzy feelings  Has cardio appt as well    ROS  A comprehensive review of system was obtained and negative except findings in the HPI    Visit Vitals  /84 (BP 1 Location: Left upper arm, BP Patient Position: Sitting, BP Cuff Size: Large adult)   Pulse 77   Resp 16   Ht 5' 7\" (1.702 m)   Wt 205 lb (93 kg)   LMP 09/20/2022 (Exact Date)   SpO2 98%   BMI 32.11 kg/m²     Physical Exam  Vitals and nursing note reviewed. Constitutional:       Appearance: Normal appearance. She is well-developed. Comments:      Neck:      Vascular: No JVD. Cardiovascular:      Rate and Rhythm: Normal rate and regular rhythm. Heart sounds: Normal heart sounds. No murmur heard. No friction rub. No gallop. Pulmonary:      Effort: Pulmonary effort is normal. No respiratory distress. Breath sounds: Normal breath sounds. No wheezing. Skin:     General: Skin is warm. Neurological:      Mental Status: She is alert and oriented to person, place, and time. ASSESSMENT and PLAN  Encounter Diagnoses   Name Primary? Hypothyroidism, unspecified type Yes    Hypertension, essential     Anxiety associated with depression      Orders Placed This Encounter    THYROID PEROXIDASE (TPO) AB    T3 TOTAL    TSH 3RD GENERATION     Labs redrawn  Did not want to restart her anxiety meds yet  Advised to cont her visit with cardio, most likely anxiety and panic related  I have discussed the diagnosis with the patient and the intended plan as seen in the above orders.  The patient has received an after-visit summary and questions were answered concerning future plans. Patient conveyed understanding of the plan at the time of the visit.     Juni Merrill MSN, ANP  10/25/2022

## 2022-11-01 ENCOUNTER — ANCILLARY PROCEDURE (OUTPATIENT)
Dept: CARDIOLOGY CLINIC | Age: 32
End: 2022-11-01
Payer: COMMERCIAL

## 2022-11-01 DIAGNOSIS — R00.2 HEART PALPITATIONS: ICD-10-CM

## 2022-11-01 DIAGNOSIS — R07.9 CHEST PAIN, UNSPECIFIED TYPE: ICD-10-CM

## 2022-11-01 DIAGNOSIS — I10 HYPERTENSION, ESSENTIAL: ICD-10-CM

## 2022-11-01 LAB
ECHO AO ASC DIAM: 2.6 CM
ECHO AO ROOT DIAM: 3.1 CM
ECHO AV AREA PEAK VELOCITY: 3 CM2
ECHO AV AREA VTI: 2.8 CM2
ECHO AV MEAN GRADIENT: 4 MMHG
ECHO AV MEAN VELOCITY: 0.9 M/S
ECHO AV PEAK GRADIENT: 6 MMHG
ECHO AV PEAK VELOCITY: 1.3 M/S
ECHO AV VELOCITY RATIO: 0.77
ECHO AV VTI: 27.2 CM
ECHO EST RA PRESSURE: 8 MMHG
ECHO LA DIAMETER: 3.9 CM
ECHO LA TO AORTIC ROOT RATIO: 1.26
ECHO LA VOL 2C: 63 ML (ref 22–52)
ECHO LA VOL 4C: 64 ML (ref 22–52)
ECHO LA VOLUME AREA LENGTH: 67 ML
ECHO LV E' LATERAL VELOCITY: 14 CM/S
ECHO LV E' SEPTAL VELOCITY: 10 CM/S
ECHO LV EDV A2C: 121 ML
ECHO LV EDV A4C: 135 ML
ECHO LV EDV BP: 128 ML (ref 56–104)
ECHO LV EJECTION FRACTION A2C: 60 %
ECHO LV EJECTION FRACTION A4C: 60 %
ECHO LV EJECTION FRACTION BIPLANE: 60 % (ref 55–100)
ECHO LV ESV A2C: 48 ML
ECHO LV ESV A4C: 54 ML
ECHO LV ESV BP: 52 ML (ref 19–49)
ECHO LV FRACTIONAL SHORTENING: 33 % (ref 28–44)
ECHO LV INTERNAL DIMENSION DIASTOLIC: 4.9 CM (ref 3.9–5.3)
ECHO LV INTERNAL DIMENSION SYSTOLIC: 3.3 CM
ECHO LV IVSD: 0.8 CM (ref 0.6–0.9)
ECHO LV MASS 2D: 131.2 G (ref 67–162)
ECHO LV POSTERIOR WALL DIASTOLIC: 0.8 CM (ref 0.6–0.9)
ECHO LV RELATIVE WALL THICKNESS RATIO: 0.33
ECHO LVOT AREA: 3.8 CM2
ECHO LVOT AV VTI INDEX: 0.73
ECHO LVOT DIAM: 2.2 CM
ECHO LVOT MEAN GRADIENT: 2 MMHG
ECHO LVOT PEAK GRADIENT: 4 MMHG
ECHO LVOT PEAK VELOCITY: 1 M/S
ECHO LVOT SV: 75.2 ML
ECHO LVOT VTI: 19.8 CM
ECHO MV A VELOCITY: 0.42 M/S
ECHO MV AREA PHT: 3.7 CM2
ECHO MV AREA VTI: 3 CM2
ECHO MV E DECELERATION TIME (DT): 203.9 MS
ECHO MV E VELOCITY: 0.96 M/S
ECHO MV E/A RATIO: 2.29
ECHO MV E/E' LATERAL: 6.86
ECHO MV E/E' RATIO (AVERAGED): 8.23
ECHO MV E/E' SEPTAL: 9.6
ECHO MV LVOT VTI INDEX: 1.28
ECHO MV MAX VELOCITY: 0.8 M/S
ECHO MV MEAN GRADIENT: 1 MMHG
ECHO MV MEAN VELOCITY: 0.5 M/S
ECHO MV PEAK GRADIENT: 3 MMHG
ECHO MV PRESSURE HALF TIME (PHT): 59.1 MS
ECHO MV VTI: 25.4 CM
ECHO RV FREE WALL PEAK S': 12 CM/S
ECHO RV INTERNAL DIMENSION: 3.6 CM
ECHO RV TAPSE: 2.6 CM (ref 1.7–?)

## 2022-11-01 PROCEDURE — 93306 TTE W/DOPPLER COMPLETE: CPT | Performed by: INTERNAL MEDICINE

## 2022-11-14 DIAGNOSIS — F90.2 ATTENTION DEFICIT HYPERACTIVITY DISORDER (ADHD), COMBINED TYPE: ICD-10-CM

## 2022-11-14 RX ORDER — DEXTROAMPHETAMINE SACCHARATE, AMPHETAMINE ASPARTATE, DEXTROAMPHETAMINE SULFATE AND AMPHETAMINE SULFATE 5; 5; 5; 5 MG/1; MG/1; MG/1; MG/1
20 TABLET ORAL 2 TIMES DAILY
Qty: 60 TABLET | Refills: 0 | Status: SHIPPED | OUTPATIENT
Start: 2022-11-14

## 2022-11-18 ENCOUNTER — OFFICE VISIT (OUTPATIENT)
Dept: CARDIOLOGY CLINIC | Age: 32
End: 2022-11-18
Payer: COMMERCIAL

## 2022-11-18 VITALS
HEART RATE: 78 BPM | OXYGEN SATURATION: 97 % | SYSTOLIC BLOOD PRESSURE: 114 MMHG | WEIGHT: 202 LBS | RESPIRATION RATE: 16 BRPM | HEIGHT: 67 IN | BODY MASS INDEX: 31.71 KG/M2 | DIASTOLIC BLOOD PRESSURE: 84 MMHG

## 2022-11-18 DIAGNOSIS — R42 DIZZINESS: Primary | ICD-10-CM

## 2022-11-18 DIAGNOSIS — R00.2 PALPITATIONS: ICD-10-CM

## 2022-11-18 PROCEDURE — 99214 OFFICE O/P EST MOD 30 MIN: CPT | Performed by: INTERNAL MEDICINE

## 2022-11-18 PROCEDURE — 3074F SYST BP LT 130 MM HG: CPT | Performed by: INTERNAL MEDICINE

## 2022-11-18 PROCEDURE — 3078F DIAST BP <80 MM HG: CPT | Performed by: INTERNAL MEDICINE

## 2022-11-18 NOTE — LETTER
11/18/2022    Patient: Tunde Elam   YOB: 1990   Date of Visit: 11/18/2022     [place]  [address] [facility] [address] [id number]  [address] [facility] [address]    Dear Selma Gilford, NP,      Thank you for referring Ms. Tunde Elam to [address]  for evaluation. My notes for this consultation are attached. If you have questions, please do not hesitate to call me. I look forward to following your patient along with you.       Sincerely,    Jamie Cruz MD

## 2022-11-18 NOTE — PROGRESS NOTES
Nilsa Mei MD., [facility]    [address] [place], [address] [facility] [address]    Office [phone],UYY [phone]           Kate Monte is here for a f/u office visit. Primary care physician:  Matheus Oh NP    CC - as documented in EMR    Dear Dr. Matheus Oh NP    I had the pleasure of seeing Luz Elena Leon in the office today. Assessment:     Palpitations/dizziness  HTN  ADHD  Asthma        Plan:      Patient is concerned that she has POTS syndrome. I indicated to her that she is on metoprolol and Adderall which could be contributing to her symptoms 2. She will monitor her blood pressure and systolic blood pressure is persistently less than 110, she will hold her metoprolol. Also, patient is requesting to do a tilt table study. Will refer to neurology-Dr. Aaron Sosa. I indicated to her that she will need to be off Adderall and metoprolol prior to the test.      14-day Holter monitor.-No alerts. Patient just returned the monitor and will get finalized report. Echocardiogram.-Normal EF  Continue metoprolol  Aggressive cardiovascular risk factor modification  Follow-up in April 2022/earlier as needed        Patient understands the plan. All questions were answered to the patient's satisfaction. I appreciate the opportunity to be involved in Ms. Torres. See note below for details. Please do not hesitate to contact us with questions or concerns. Nilsa Mei MD      Cardiac Testing/ Procedures: A. Cardiac Cath/PCI:    B.ECHO/YANDY: 11/1/2022-echo-Left Ventricle: Normal left ventricular systolic function. EF by 2D Simpsons Biplane is 60%. Left ventricle size is normal. Normal wall thickness. Normal wall motion. Normal diastolic function. 9/18/20LV: Calculated LVEF is 62%. Normal cavity size, wall thickness, systolic function (ejection fraction normal) and diastolic function.  Wall motion: normal.  IVC: Moderately elevated central venous pressure (10-15 mmHg); IVC diameter is larger than 21 mm and collapses more than 50% with respiration. C.StressNuclear/Stress ECHO/Stress test: 9/18/20 - 11min/neg stress test  D. Vascular:    E. EP:    F. Miscellaneous:Echo (12/11/2015): LVEF 55-60%, no RWMA. No significant structural abnormality noted. Treadmill stress test (12/11/2015): Negative. Holter (11/25/2015): HR  bpm.  Longest episode of tachycardia 2 minutes. 2.91% supraventricular ectopic beats, rare PVCs. Rare episodes of ectopic atrial rhythms. Frequent PACs, some bigeminy & trigeminy. Long RP tachycardia       History:     Aylin Dove is a 29 y.o. female who returns for follow up visit. History of dizziness. No syncope. No chest pain/dyspnea. No swelling lower extremities. Scheduled to go back to [facility] from December to March. ('s family is from there)      On Adderall for ADHD    Thyroid work-up negative previously. Past medical history-asthma, essential hypertension with pregnancy, pseudotumor cerebri, depression, anxiety, iron deficiency anemia and seizures    Patient is planning to go back to [facility] in late November/December    ROS:  (bold if positive, if negative)           Medications:       Current Outpatient Medications   Medication Sig Dispense    dextroamphetamine-amphetamine (ADDERALL) 20 mg tablet Take 1 Tablet by mouth two (2) times a day. Max Daily Amount: 40 mg. 60 Tablet    traZODone (DESYREL) 100 mg tablet Take 1 Tablet by mouth nightly. 30 Tablet    budesonide-formoteroL (SYMBICORT) 160-4.5 mcg/actuation HFAA INHALE 2 PUFFS BY MOUTH TWICE DAILY 1 Each    metoprolol succinate (TOPROL-XL) 25 mg XL tablet TAKE ONE TABLET BY MOUTH DAILY 90 Tablet    pramipexole (MIRAPEX) 0.5 mg tablet TAKE 1 TABLET BY MOUTH EVERY NIGHT AT 7PM 90 Tablet     No current facility-administered medications for this visit.            Family History of CAD:    No    Social History:  Current  Smoker  No    Physical Exam:     Visit Vitals  /84 (BP 1 Location: Left upper arm, BP Patient Position: Sitting, BP Cuff Size: Large adult)   Pulse 78   Resp 16   Ht 5' 7\" (1.702 m)   Wt 202 lb (91.6 kg)   LMP 11/01/2022   SpO2 97%   BMI 31.64 kg/m²          Gen: Well-developed, well-nourished, in no acute distress  Neck: Supple,No JVD, No Carotid Bruit,   Resp: No accessory muscle use, Clear breath sounds, No rales or rhonchi  Card: Regular Rate,Rythm,Normal S1, S2, No murmurs, rubs or gallop. No thrills. Abd:  Soft, BS+,   MSK: No cyanosis  Skin: No rashes    Neuro: moving all four extremities , follows commands appropriately  Psych:  Good insight, oriented to person, place , alert, Nml Affect  LE: No edema    EKG:       Medication Side Effects and Warnings were discussed with patient: yes  Patient Labs were reviewed and/or requested:  yes  Patient Past Records were reviewed and/or requested: yes    Total time :        mins    ATTENTION:   This medical record was transcribed using an electronic medical records/speech recognition system. Although proofread, it may and can contain electronic, spelling and other errors. Corrections may be executed at a later time. Please feel free to contact us for any clarifications as needed.       Savi Moseley MD

## 2022-11-29 ENCOUNTER — DOCUMENTATION ONLY (OUTPATIENT)
Dept: FAMILY MEDICINE CLINIC | Age: 32
End: 2022-11-29

## 2022-11-29 NOTE — PROGRESS NOTES
Spoke with pt insurance - concerning pt going out of country & getting her routine meds for 90 days- advised will need copy of flight ticket /intinerary & form will be faxed that needs completeing- spoke with pt to have copy of flight- states will submit through my chart.

## 2022-11-30 ENCOUNTER — TELEPHONE (OUTPATIENT)
Dept: FAMILY MEDICINE CLINIC | Age: 32
End: 2022-11-30

## 2022-11-30 NOTE — TELEPHONE ENCOUNTER
See patient message, more info regarding getting 90d supply before going to [place].  Arnold Leventhal

## 2022-11-30 NOTE — TELEPHONE ENCOUNTER
----- Message from [facility] sent at 11/29/2022  3:45 PM EST -----  Regarding: Refills  Here is a copy of my ticket

## 2022-12-01 ENCOUNTER — OFFICE VISIT (OUTPATIENT)
Dept: NEUROLOGY | Age: 32
End: 2022-12-01
Payer: COMMERCIAL

## 2022-12-01 DIAGNOSIS — R42 DIZZINESS: Primary | ICD-10-CM

## 2022-12-01 PROCEDURE — 93660 TILT TABLE EVALUATION: CPT | Performed by: PSYCHIATRY & NEUROLOGY

## 2022-12-02 ENCOUNTER — TELEPHONE (OUTPATIENT)
Dept: FAMILY MEDICINE CLINIC | Age: 32
End: 2022-12-02

## 2022-12-02 ENCOUNTER — VIRTUAL VISIT (OUTPATIENT)
Dept: FAMILY MEDICINE CLINIC | Age: 32
End: 2022-12-02
Payer: COMMERCIAL

## 2022-12-02 DIAGNOSIS — G25.81 RESTLESS LEG SYNDROME: ICD-10-CM

## 2022-12-02 DIAGNOSIS — J45.40 MODERATE PERSISTENT ASTHMA WITHOUT COMPLICATION: Primary | ICD-10-CM

## 2022-12-02 DIAGNOSIS — F90.2 ATTENTION DEFICIT HYPERACTIVITY DISORDER (ADHD), COMBINED TYPE: ICD-10-CM

## 2022-12-02 DIAGNOSIS — G47.00 INSOMNIA, UNSPECIFIED TYPE: ICD-10-CM

## 2022-12-02 DIAGNOSIS — I10 HYPERTENSION, ESSENTIAL: ICD-10-CM

## 2022-12-02 RX ORDER — METOPROLOL SUCCINATE 25 MG/1
TABLET, EXTENDED RELEASE ORAL
Qty: 90 TABLET | Refills: 0 | Status: SHIPPED | OUTPATIENT
Start: 2022-12-02

## 2022-12-02 RX ORDER — DEXTROAMPHETAMINE SACCHARATE, AMPHETAMINE ASPARTATE, DEXTROAMPHETAMINE SULFATE AND AMPHETAMINE SULFATE 5; 5; 5; 5 MG/1; MG/1; MG/1; MG/1
20 TABLET ORAL 2 TIMES DAILY
Qty: 60 TABLET | Refills: 0 | Status: SHIPPED | OUTPATIENT
Start: 2022-12-02 | End: 2023-01-01

## 2022-12-02 RX ORDER — BUDESONIDE AND FORMOTEROL FUMARATE DIHYDRATE 160; 4.5 UG/1; UG/1
AEROSOL RESPIRATORY (INHALATION)
Qty: 3 EACH | Refills: 0 | Status: SHIPPED | OUTPATIENT
Start: 2022-12-02

## 2022-12-02 RX ORDER — PRAMIPEXOLE DIHYDROCHLORIDE 0.5 MG/1
TABLET ORAL
Qty: 90 TABLET | Refills: 0 | Status: SHIPPED | OUTPATIENT
Start: 2022-12-02

## 2022-12-02 RX ORDER — TRAZODONE HYDROCHLORIDE 100 MG/1
100 TABLET ORAL
Qty: 90 TABLET | Refills: 0 | Status: SHIPPED | OUTPATIENT
Start: 2022-12-02

## 2022-12-02 NOTE — TELEPHONE ENCOUNTER
Called and spoke with pharmacy to verify all rx has been sent and received,and ok to fill medications,pt will be going out of country on Sunday. Advised PA has been initiated with insurance company and on file. Pharmacy stated he understood,rx are in process of being filled. Pt has been advised medications have been sent and received by pharmacy and pharmacy is in process of filling medications now,stated she understood.

## 2022-12-02 NOTE — PROGRESS NOTES
Shameka Rojas is a 29 y.o. female who was seen by synchronous (real-time) audio-video technology on 12/2/2022 for Behavioral Problem, Hypertension, Restless Leg Syndrome, Insomnia, and Medication Evaluation        Assessment & Plan:   Diagnoses and all orders for this visit:    1. Moderate persistent asthma without complication  -     budesonide-formoteroL (SYMBICORT) 160-4.5 mcg/actuation HFAA; INHALE 2 PUFFS BY MOUTH TWICE DAILY    2. Hypertension, essential  -     metoprolol succinate (TOPROL-XL) 25 mg XL tablet; TAKE ONE TABLET BY MOUTH DAILY    3. Restless leg syndrome  -     pramipexole (MIRAPEX) 0.5 mg tablet; TAKE 1 TABLET BY MOUTH EVERY NIGHT AT 7PM    4. Insomnia, unspecified type  -     traZODone (DESYREL) 100 mg tablet; Take 1 Tablet by mouth nightly. 5. Attention deficit hyperactivity disorder (ADHD), combined type  -     dextroamphetamine-amphetamine (ADDERALL) 20 mg tablet; Take 1 Tablet by mouth two (2) times a day for 30 days. Max Daily Amount: 40 mg.  -     dextroamphetamine-amphetamine (ADDERALL) 20 mg tablet; Take 1 Tablet by mouth two (2) times a day for 30 days. Max Daily Amount: 40 mg. - must last 30 days  -     dextroamphetamine-amphetamine (ADDERALL) 20 mg tablet; Take 1 Tablet by mouth two (2) times a day for 30 days. Max Daily Amount: 40 mg. Refill of all medication sent to pharmacy on file. Nurse called pharmacy to make sure they are aware pt needs early refills of the 90 day medications and that patient was given the okay to fill 3 thirty day prescriptions for adderall prior to traveling out of the country for 30 days. Follow up with regular PCP for next refill request.     I spent at least 20 minutes on this visit with this established patient. [id number]  Subjective:     Chief Complaint   Patient presents with    Behavioral Problem    Hypertension    Restless Leg Syndrome    Insomnia    Medication Evaluation         Patient vv appt today for emergency refill of medications.  I agreed to see pt virtually this afternoon as I am the only provider seeing patients for the practice this afternoon. Pt will be traveling to [facility] for 2 months, plane leaves on Sunday. Pt reports all medications need to be refilled. Trazodone, Mirapex, Metoprolol, and Symbicort can be sent as 90 day supply. Pt would like for Adderall to be sent as 3 separate rx for 30 day supply due to cost is cheaper. Pt is referencing PA's that was done in regards to matter,unable to help as situation is unclear. PA nurse who has been handling request is not in office on Fridays  Pt's pcp is not in office on Fridays to help shed light on situation either. Prior to Admission medications    Medication Sig Start Date End Date Taking? Authorizing Provider   dextroamphetamine-amphetamine (ADDERALL) 20 mg tablet Take 1 Tablet by mouth two (2) times a day. Max Daily Amount: 40 mg. 11/14/22  Yes Den Silvestre MD   traZODone (DESYREL) 100 mg tablet Take 1 Tablet by mouth nightly.  10/24/22  Yes Scot Rushing, NP   budesonide-formoteroL (SYMBICORT) 160-4.5 mcg/actuation HFAA INHALE 2 PUFFS BY MOUTH TWICE DAILY 10/5/22  Yes Den Silvestre MD   metoprolol succinate (TOPROL-XL) 25 mg XL tablet TAKE ONE TABLET BY MOUTH DAILY 10/5/22  Yes Den Silvestre MD   pramipexole (MIRAPEX) 0.5 mg tablet TAKE 1 TABLET BY MOUTH EVERY NIGHT AT 7PM 10/5/22  Yes Den Silvestre MD     Patient Active Problem List    Diagnosis Date Noted    Restless leg syndrome 04/18/2022    Hypertension, essential 04/18/2022    Obesity, morbid ([facility] [address] 12/06/2017    Chest pain 11/25/2015    Other bipolar disorders 01/30/2014    Somatization disorder 01/30/2014    Anxiety 01/30/2014    Attention deficit hyperactivity disorder (ADHD), combined type 01/30/2014    Asthma 10/29/2013    Pseudotumor cerebri 08/09/2012    Headache(784.0) 08/03/2011    Anxiety associated with depression 03/25/2011     Current Outpatient Medications   Medication Sig Dispense Refill traZODone (DESYREL) 100 mg tablet Take 1 Tablet by mouth nightly. 90 Tablet 0    budesonide-formoteroL (SYMBICORT) 160-4.5 mcg/actuation HFAA INHALE 2 PUFFS BY MOUTH TWICE DAILY 3 Each 0    metoprolol succinate (TOPROL-XL) 25 mg XL tablet TAKE ONE TABLET BY MOUTH DAILY 90 Tablet 0    pramipexole (MIRAPEX) 0.5 mg tablet TAKE 1 TABLET BY MOUTH EVERY NIGHT AT 7PM 90 Tablet 0    dextroamphetamine-amphetamine (ADDERALL) 20 mg tablet Take 1 Tablet by mouth two (2) times a day for 30 days. Max Daily Amount: 40 mg. 60 Tablet 0    dextroamphetamine-amphetamine (ADDERALL) 20 mg tablet Take 1 Tablet by mouth two (2) times a day for 30 days. Max Daily Amount: 40 mg. - must last 30 days 60 Tablet 0    dextroamphetamine-amphetamine (ADDERALL) 20 mg tablet Take 1 Tablet by mouth two (2) times a day for 30 days. Max Daily Amount: 40 mg. 60 Tablet 0    dextroamphetamine-amphetamine (ADDERALL) 20 mg tablet Take 1 Tablet by mouth two (2) times a day. Max Daily Amount: 40 mg. 60 Tablet 0     Allergies   Allergen Reactions    Diclofenac Nausea Only    Sulfa (Sulfonamide Antibiotics) Hives and Other (comments)     gas       ROS    Objective:     Patient-Reported Vitals 3/3/2021   Patient-Reported Weight 190lb      General: alert, cooperative, no distress   Mental  status: normal mood, behavior   HENT: NCAT   Neck: no visualized mass   Resp: no respiratory distress                 Additional exam findings: We discussed the expected course, resolution and complications of the diagnosis(es) in detail. Medication risks, benefits, costs, interactions, and alternatives were discussed as indicated. I advised her to contact the office if her condition worsens, changes or fails to improve as anticipated. She expressed understanding with the diagnosis(es) and plan. Pj Almazan, was evaluated through a synchronous (real-time) audio-video encounter.  The patient (or guardian if applicable) is aware that this is a billable service, which includes applicable co-pays. This Virtual Visit was conducted with patient's (and/or legal guardian's) consent. The visit was conducted pursuant to the emergency declaration under the [address] [place] [address] authority and the [employer] General Act. Patient identification was verified, and a caregiver was present when appropriate. The patient was located at: Home: [address]  The provider was located at:  Facility (Appt Department): [address] [place] [address]., NP

## 2022-12-02 NOTE — PROGRESS NOTES
Chief Complaint   Patient presents with    Behavioral Problem    Hypertension    Restless Leg Syndrome    Insomnia    Medication Evaluation         Patient vv appt today for emergency refill of medications. Pt will be traveling to [facility] for 2 months,plane leaves on Sunday. Pt reports all medications need to be refilled. Trazodone,Mirapex,Metoprolol,and Symbicort can be sent as 90 day supply. Pt would like for Adderall to be sent as 3 separate rx for 30 day supply due to cost is cheaper. Pt is referencing PA's that was done in regards to matter,unable to help as situation is unclear. PA nurse who has been handling request is not in office on Fridays  Pt's pcp is not in office on Fridays to help shed light on situation either.

## 2022-12-05 ENCOUNTER — DOCUMENTATION ONLY (OUTPATIENT)
Dept: CARDIOLOGY CLINIC | Age: 32
End: 2022-12-05

## 2022-12-05 NOTE — PROCEDURES
[facility]  [address]  [facility] [address] [place], [address] [facility]  Phone: [phone]  FAX: [phone]     Clinical Autonomic Testing Report     Patient ID:  Geneva Laughlin  [id number]  72 y.o.  1990     REFERRED BY: Wilbur Huertas MD  PCP: Dixon Mayfield NP    Date of Testin2022       Indication/History:    Episodes of dizziness and feeling faint (+) anxiety    Patient is coming for syncope/autonomic dysfunction evaluation. Medications taken 48 hrs before the test: None     Procedure: Referring provider only requested Head-Up Tilt Table Testing. It is performed by utilizing [address] [place] [address] [employer] Autonomic System, with established protocol. Result:HUT (head-up tilt) : [id number] BP and HR were measured, up to 15 minutes post tilt. No significant BP reduction or HR acceleration/deceleration. Impression:   NORMAL    No orthostatic hypotension or significant tachycardia noted despite patient report of lightheadedness/dizziness during tilt table testing.          Erich Fu MD  Diplomate, American Board of Psychiatry and Neurology  Diplomate, Neuromuscular Medicine  Diplomate, American Board of Electrodiagnostic Medicine    Note: Raw Data will be scanned separately in Media

## 2022-12-05 NOTE — PROGRESS NOTES
Event monitor 10/27/2022-11/8/2022    New moderate 42 bpm, maximum heart rate 165 bpm  PVC burden less than 0.01%  PAC burden 0.03%  1 episode of 3 beat run of NSVT  Patient symptoms consistent with sinus rhythm/sinus tachycardia/PAC/PVC    Discussed with patient during her office visit 11/18/2022    Gill Storey MD, [facility]

## 2022-12-16 ENCOUNTER — TELEPHONE (OUTPATIENT)
Dept: CARDIOLOGY CLINIC | Age: 32
End: 2022-12-16

## 2022-12-16 NOTE — TELEPHONE ENCOUNTER
----- Message from Yesy Paris RN sent at 12/16/2022  8:27 AM EST -----  Regarding: FW: Holter     ----- Message -----  From: Mariama Padron  Sent: 12/16/2022   8:05 AM EST  To: [facility] Nurse Pool  Subject: Holter                                           Hello,   I was looking over my holter result. Do I need to be concerned about the vtac episode? Why would this occur?      Thanks,  Be

## 2023-01-01 NOTE — TELEPHONE ENCOUNTER
Patient requesting Adderall medication to be sent to a different pharmacy [facility]. Statement Selected

## 2023-01-10 ENCOUNTER — TELEPHONE (OUTPATIENT)
Dept: CARDIOLOGY CLINIC | Age: 33
End: 2023-01-10

## 2023-01-10 NOTE — TELEPHONE ENCOUNTER
Patient is reaching out following up on vtach concern on monitor. Please advise. 1/8/23 Ridley Message  Hi, I hadnt received a response about my previous message. Just following up. Thanks      12/16/22 Ridley Message  Hello,   I was looking over my holter result. Do I need to be concerned about the vtac episode? Why would this occur?

## 2023-05-03 ENCOUNTER — TELEPHONE (OUTPATIENT)
Dept: FAMILY MEDICINE CLINIC | Age: 33
End: 2023-05-03

## 2023-05-03 DIAGNOSIS — F90.2 ATTENTION DEFICIT HYPERACTIVITY DISORDER (ADHD), COMBINED TYPE: ICD-10-CM

## 2023-05-03 DIAGNOSIS — G25.81 RESTLESS LEG SYNDROME: ICD-10-CM

## 2023-05-03 DIAGNOSIS — I10 HYPERTENSION, ESSENTIAL: ICD-10-CM

## 2023-05-04 RX ORDER — METOPROLOL SUCCINATE 25 MG/1
TABLET, EXTENDED RELEASE ORAL
Qty: 90 TABLET | Refills: 0 | Status: SHIPPED | OUTPATIENT
Start: 2023-05-04

## 2023-05-04 RX ORDER — PRAMIPEXOLE DIHYDROCHLORIDE 0.5 MG/1
TABLET ORAL
Qty: 90 TABLET | Refills: 0 | Status: SHIPPED | OUTPATIENT
Start: 2023-05-04

## 2023-05-04 RX ORDER — DEXTROAMPHETAMINE SACCHARATE, AMPHETAMINE ASPARTATE, DEXTROAMPHETAMINE SULFATE AND AMPHETAMINE SULFATE 5; 5; 5; 5 MG/1; MG/1; MG/1; MG/1
20 TABLET ORAL 2 TIMES DAILY
Qty: 60 TABLET | Refills: 0 | Status: SHIPPED | OUTPATIENT
Start: 2023-05-04

## 2023-05-17 ENCOUNTER — TELEPHONE (OUTPATIENT)
Age: 33
End: 2023-05-17

## 2023-05-17 ENCOUNTER — TELEMEDICINE (OUTPATIENT)
Age: 33
End: 2023-05-17
Payer: COMMERCIAL

## 2023-05-17 DIAGNOSIS — I10 ESSENTIAL (PRIMARY) HYPERTENSION: ICD-10-CM

## 2023-05-17 DIAGNOSIS — G25.81 RESTLESS LEGS SYNDROME: ICD-10-CM

## 2023-05-17 DIAGNOSIS — F90.2 ATTENTION-DEFICIT HYPERACTIVITY DISORDER, COMBINED TYPE: Primary | ICD-10-CM

## 2023-05-17 PROCEDURE — 99214 OFFICE O/P EST MOD 30 MIN: CPT | Performed by: NURSE PRACTITIONER

## 2023-05-17 RX ORDER — METOPROLOL SUCCINATE 25 MG/1
25 TABLET, EXTENDED RELEASE ORAL DAILY
Qty: 90 TABLET | Refills: 1 | Status: SHIPPED | OUTPATIENT
Start: 2023-05-17

## 2023-05-17 RX ORDER — DEXTROAMPHETAMINE SACCHARATE, AMPHETAMINE ASPARTATE, DEXTROAMPHETAMINE SULFATE AND AMPHETAMINE SULFATE 5; 5; 5; 5 MG/1; MG/1; MG/1; MG/1
20 TABLET ORAL 2 TIMES DAILY
Qty: 60 TABLET | Refills: 0 | Status: SHIPPED | OUTPATIENT
Start: 2023-05-17 | End: 2023-06-16

## 2023-05-17 RX ORDER — PRAMIPEXOLE DIHYDROCHLORIDE 0.5 MG/1
TABLET ORAL
Qty: 90 TABLET | Refills: 1 | Status: SHIPPED | OUTPATIENT
Start: 2023-05-17

## 2023-05-17 ASSESSMENT — PATIENT HEALTH QUESTIONNAIRE - PHQ9
SUM OF ALL RESPONSES TO PHQ QUESTIONS 1-9: 0
2. FEELING DOWN, DEPRESSED OR HOPELESS: 0
SUM OF ALL RESPONSES TO PHQ QUESTIONS 1-9: 0
SUM OF ALL RESPONSES TO PHQ QUESTIONS 1-9: 0
1. LITTLE INTEREST OR PLEASURE IN DOING THINGS: 0
SUM OF ALL RESPONSES TO PHQ QUESTIONS 1-9: 0
SUM OF ALL RESPONSES TO PHQ9 QUESTIONS 1 & 2: 0

## 2023-05-17 ASSESSMENT — ANXIETY QUESTIONNAIRES
GAD7 TOTAL SCORE: 0
6. BECOMING EASILY ANNOYED OR IRRITABLE: 0
3. WORRYING TOO MUCH ABOUT DIFFERENT THINGS: 0
2. NOT BEING ABLE TO STOP OR CONTROL WORRYING: 0
7. FEELING AFRAID AS IF SOMETHING AWFUL MIGHT HAPPEN: 0
5. BEING SO RESTLESS THAT IT IS HARD TO SIT STILL: 0
4. TROUBLE RELAXING: 0
1. FEELING NERVOUS, ANXIOUS, OR ON EDGE: 0

## 2023-05-17 NOTE — PROGRESS NOTES
Chief Complaint   Patient presents with    Medication Refill     Pt being seen for med refill  -meds pended for provider    1. Have you been to the ER, urgent care clinic since your last visit? Hospitalized since your last visit? No    2. Have you seen or consulted any other health care providers outside of the [address] [place] [address] since your last visit? Include any pap smears or colon screening. No    Depression: Not at risk    PHQ-2 Score: 0     Who is the primary learner? Patient    What is the preferred language for health care of the primary learner? ENGLISH    How does the primary learner prefer to learn new concepts?  DEMONSTRATION    Answered By patient    Relationship to Learner SELF      Pt has no other concerns

## 2023-05-17 NOTE — PROGRESS NOTES
Destiney Clayton (:  1990) is a Established patient, presenting virtually for evaluation of the [facility] today for medication refill. Diagnoses and all orders for this visit:    Attention-deficit hyperactivity disorder, combined type  -     amphetamine-dextroamphetamine (ADDERALL) 20 MG tablet; Take 1 tablet by mouth 2 times daily for 30 days. 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 Stable on medication without weight loss, decreased appetite, insomnia, or tremor. Good focus control. Gets three months of scripts at a time. See med order for dose.   She also needs refills of her Mirapex and Toprol  She had been off the meds while she was in [place] the last several months    Review of Systems     A comprehensive review of system was obtained and negative except findings in the HPI      Objective   Patient-Reported Vitals  No data recorded     Physical Exam  [INSTRUCTIONS:  \"[x]\" Indicates a positive item  \"[]\" Indicates a negative item  -- DELETE ALL ITEMS NOT EXAMINED]    Constitutional: [x] Appears well-developed and well-nourished [x] No apparent distress      [] Abnormal -     Mental status: [x] Alert and awake  [x] Oriented to person/place/time [x] Able to follow commands    [] Abnormal -     Eyes:   EOM    [x]  Normal    [] Abnormal -   Sclera  [x]  Normal    [] Abnormal -          Discharge [x]  None visible   [] Abnormal -     HENT: [x] Normocephalic, atraumatic  [] Abnormal -   [x] Mouth/Throat: Mucous membranes

## 2023-06-16 ENCOUNTER — TELEPHONE (OUTPATIENT)
Age: 33
End: 2023-06-16

## 2023-06-22 NOTE — TELEPHONE ENCOUNTER
Request was forwarded to Olegario Eller to be processed.  If patient has any questions she can contact Olegario Eller @ [phone]

## 2023-07-26 ENCOUNTER — TELEPHONE (OUTPATIENT)
Age: 33
End: 2023-07-26

## 2023-07-26 DIAGNOSIS — F90.2 ATTENTION-DEFICIT HYPERACTIVITY DISORDER, COMBINED TYPE: ICD-10-CM

## 2023-07-26 RX ORDER — DEXTROAMPHETAMINE SACCHARATE, AMPHETAMINE ASPARTATE, DEXTROAMPHETAMINE SULFATE AND AMPHETAMINE SULFATE 5; 5; 5; 5 MG/1; MG/1; MG/1; MG/1
20 TABLET ORAL 2 TIMES DAILY
Qty: 60 TABLET | Refills: 0 | Status: SHIPPED | OUTPATIENT
Start: 2023-07-26 | End: 2023-08-25

## 2023-07-26 NOTE — TELEPHONE ENCOUNTER
Patient hasn't left for the  yet and needs her adderral refilled  Pharm [facility]    [phone] patient, please notify, ok to notify by portal

## 2023-08-30 RX ORDER — NYSTATIN 100000 U/G
CREAM TOPICAL
Qty: 30 G | Refills: 1 | Status: SHIPPED | OUTPATIENT
Start: 2023-08-30

## 2023-09-05 ENCOUNTER — HOSPITAL ENCOUNTER (EMERGENCY)
Facility: HOSPITAL | Age: 33
Discharge: HOME | End: 2023-09-05
Attending: STUDENT IN AN ORGANIZED HEALTH CARE EDUCATION/TRAINING PROGRAM
Payer: COMMERCIAL

## 2023-09-05 VITALS
WEIGHT: 200 LBS | BODY MASS INDEX: 31.39 KG/M2 | DIASTOLIC BLOOD PRESSURE: 83 MMHG | HEIGHT: 67 IN | TEMPERATURE: 98.7 F | HEART RATE: 96 BPM | SYSTOLIC BLOOD PRESSURE: 148 MMHG | RESPIRATION RATE: 18 BRPM | OXYGEN SATURATION: 100 %

## 2023-09-05 DIAGNOSIS — L03.119 CELLULITIS OF AXILLARY REGION: Primary | ICD-10-CM

## 2023-09-05 PROCEDURE — 99283 EMERGENCY DEPT VISIT LOW MDM: CPT

## 2023-09-05 RX ORDER — CEPHALEXIN 500 MG/1
500 CAPSULE ORAL 2 TIMES DAILY
Qty: 14 CAPSULE | Refills: 0 | Status: SHIPPED | OUTPATIENT
Start: 2023-09-05 | End: 2023-09-12

## 2023-09-05 RX ORDER — CEPHALEXIN 500 MG/1
500 CAPSULE ORAL 2 TIMES DAILY
Qty: 14 CAPSULE | Refills: 0 | Status: SHIPPED | OUTPATIENT
Start: 2023-09-05 | End: 2023-09-05 | Stop reason: SDUPTHER

## 2023-09-05 ASSESSMENT — PAIN SCALES - GENERAL: PAINLEVEL_OUTOF10: 3

## 2023-09-05 ASSESSMENT — PAIN - FUNCTIONAL ASSESSMENT: PAIN_FUNCTIONAL_ASSESSMENT: 0-10

## 2023-09-05 NOTE — ED TRIAGE NOTES
Patient to ED reporting abscess under L armpit that began 3 days ago. Area is red, swollen and painful. Denies fever, nausea/vomiting and diarrhea.

## 2023-09-06 ASSESSMENT — ENCOUNTER SYMPTOMS: BACK PAIN: 0

## 2023-09-06 NOTE — DISCHARGE INSTRUCTIONS
We sent a prescription for Keflex, an antibiotic, to your pharmacy. Please take as prescribed. Please watch out for worsening symptoms to include increase in redness, drainage, or fever/chills. If these occur, please report to the nearest emergency department. Thank you for allowing us to provide you with medical care today. We realize that you have many choices for your emergency care needs. We thank you for choosing [employer]. Please choose us in the future for any continued health care needs. The exam and treatment you received in the Emergency Department were for an emergent problem and are not intended as complete care. It is important that you follow up with a doctor, nurse practitioner, or physician's assistant for ongoing care. If your symptoms worsen or you do not improve as expected and you are unable to reach your usual health care provider, you should return to the Emergency Department. We are available 24 hours a day. Please make an appointment with your health care provider(s) for follow up of your Emergency Department visit. Take this sheet with you when you go to your follow-up visit.

## 2023-09-06 NOTE — ED PROVIDER NOTES
[facility] EMERGENCY DEPT  EMERGENCY DEPARTMENT ENCOUNTER      Pt Name: Gurjit Lux  MRN: [id number]  [address] [place] 1990  Date of evaluation: 2023  Provider: Barrington Denver, PA-C    CHIEF COMPLAINT       Chief Complaint   Patient presents with    Abscess         HISTORY OF PRESENT ILLNESS   (Location/Symptom, Timing/Onset, Context/Setting, Quality, Duration, Modifying Factors, Severity)  Note limiting factors. 79-year-old female reports to ED with complaints of left skin infection/abscess under her left armpit first noticed approximately 3 days ago. Patient works as a  and wears a bulletproof vest every day which she thinks aggravates the symptoms. Denies prior occurrence or history of hydradenitis. Denies fever, chills, chest pain, or shortness of breath or reduced left shoulder range of motion. Review of External Medical Records:     Nursing Notes were reviewed. REVIEW OF SYSTEMS    (2-9 systems for level 4, 10 or more for level 5)     Review of Systems   Musculoskeletal:  Negative for back pain, neck pain and neck stiffness. Neurological:  Negative for numbness. Except as noted above the remainder of the review of systems was reviewed and negative.        PAST MEDICAL HISTORY     Past Medical History:   Diagnosis Date    ADHD     Asthma     Essential hypertension     last pregnancy; current pregnancy    Ill-defined condition     pseudo-tumor cerebri    Iron deficiency anemia     Other ill-defined conditions(799.89)     pseudo tumor cerebrae    Seizure [facility])          SURGICAL HISTORY       Past Surgical History:   Procedure Laterality Date     DELIVERY ONLY  2013     DELIVERY ONLY      DILATION AND CURETTAGE OF UTERUS      GYN       , ,2014    TUBAL LIGATION           CURRENT MEDICATIONS       Discharge Medication List as of 2023  8:40 PM        CONTINUE these medications which have NOT CHANGED    Details   nystatin (MYCOSTATIN)

## 2023-09-07 ENCOUNTER — TELEPHONE (OUTPATIENT)
Age: 33
End: 2023-09-07

## 2023-09-07 DIAGNOSIS — F90.2 ATTENTION-DEFICIT HYPERACTIVITY DISORDER, COMBINED TYPE: ICD-10-CM

## 2023-09-07 RX ORDER — METOPROLOL SUCCINATE 25 MG/1
25 TABLET, EXTENDED RELEASE ORAL DAILY
Qty: 90 TABLET | Refills: 1 | Status: SHIPPED | OUTPATIENT
Start: 2023-09-07

## 2023-09-07 RX ORDER — DEXTROAMPHETAMINE SACCHARATE, AMPHETAMINE ASPARTATE, DEXTROAMPHETAMINE SULFATE AND AMPHETAMINE SULFATE 5; 5; 5; 5 MG/1; MG/1; MG/1; MG/1
20 TABLET ORAL 2 TIMES DAILY
Qty: 60 TABLET | Refills: 0 | Status: SHIPPED | OUTPATIENT
Start: 2023-09-07 | End: 2023-10-07

## 2023-09-07 RX ORDER — PRAMIPEXOLE DIHYDROCHLORIDE 0.5 MG/1
TABLET ORAL
Qty: 90 TABLET | Refills: 1 | Status: SHIPPED | OUTPATIENT
Start: 2023-09-07

## 2023-09-07 NOTE — TELEPHONE ENCOUNTER
[facility] needs a refill of pramipexole (MIRAPEX) 0.5 MG tablet. They have 2 pills/supply left and are requesting a 30 day supply with refills. Pharmacy has been updated in the chart. Patient was advised or scheduled an appointment for the future and to request refills thru the [facility] or by requesting a refill from their pharmacy in the future. Patient was also advised to check with their pharmacy for status of when refills are available. [facility] Flores needs a refill of amphetamine-dextroamphetamine (ADDERALL) 20 MG tablet. They have 2 pills/supply left and are requesting a 30 day supply with refills. Pharmacy has been updated in the chart. Patient was advised or scheduled an appointment for the future and to request refills thru the [facility] or by requesting a refill from their pharmacy in the future. Patient was also advised to check with their pharmacy for status of when refills are available. [facility] Flores needs a refill of metoprolol succinate (TOPROL XL) 25 MG extended release tablet. They have 2 pills/supply left and are requesting a 30 day supply with refills. Pharmacy has been updated in the chart. Patient was advised or scheduled an appointment for the future and to request refills thru the [facility] or by requesting a refill from their pharmacy in the future. Patient was also advised to check with their pharmacy for status of when refills are available. Pt has an apt with you on 9.13.23.

## 2023-09-13 ENCOUNTER — TELEMEDICINE (OUTPATIENT)
Age: 33
End: 2023-09-13
Payer: COMMERCIAL

## 2023-09-13 DIAGNOSIS — F90.2 ATTENTION-DEFICIT HYPERACTIVITY DISORDER, COMBINED TYPE: ICD-10-CM

## 2023-09-13 DIAGNOSIS — I10 ESSENTIAL (PRIMARY) HYPERTENSION: ICD-10-CM

## 2023-09-13 DIAGNOSIS — G25.81 RESTLESS LEGS SYNDROME: Primary | ICD-10-CM

## 2023-09-13 PROCEDURE — 99214 OFFICE O/P EST MOD 30 MIN: CPT | Performed by: NURSE PRACTITIONER

## 2023-09-13 RX ORDER — DEXTROAMPHETAMINE SACCHARATE, AMPHETAMINE ASPARTATE, DEXTROAMPHETAMINE SULFATE AND AMPHETAMINE SULFATE 5; 5; 5; 5 MG/1; MG/1; MG/1; MG/1
20 TABLET ORAL 2 TIMES DAILY
Qty: 60 TABLET | Refills: 0 | Status: SHIPPED | OUTPATIENT
Start: 2023-09-13 | End: 2023-10-13

## 2023-09-13 RX ORDER — METOPROLOL SUCCINATE 25 MG/1
25 TABLET, EXTENDED RELEASE ORAL DAILY
Qty: 90 TABLET | Refills: 1 | Status: SHIPPED | OUTPATIENT
Start: 2023-09-13

## 2023-09-13 RX ORDER — PRAMIPEXOLE DIHYDROCHLORIDE 0.5 MG/1
TABLET ORAL
Qty: 90 TABLET | Refills: 1 | Status: SHIPPED | OUTPATIENT
Start: 2023-09-13

## 2023-09-13 RX ORDER — NYSTATIN 100000 U/G
CREAM TOPICAL
Qty: 30 G | Refills: 1 | Status: CANCELLED | OUTPATIENT
Start: 2023-09-13

## 2023-09-13 ASSESSMENT — PATIENT HEALTH QUESTIONNAIRE - PHQ9
1. LITTLE INTEREST OR PLEASURE IN DOING THINGS: 0
SUM OF ALL RESPONSES TO PHQ QUESTIONS 1-9: 0
2. FEELING DOWN, DEPRESSED OR HOPELESS: 0
SUM OF ALL RESPONSES TO PHQ9 QUESTIONS 1 & 2: 0
SUM OF ALL RESPONSES TO PHQ QUESTIONS 1-9: 0

## 2023-09-13 NOTE — PROGRESS NOTES
Chief Complaint   Patient presents with    Medication Refill     Patient went to Urgent Care x 1-2 weeks ago, cellulitis/ abscess under left arm, completed Keflex. Abscess still there. No drainage, no redness. But still there.

## 2023-09-19 NOTE — PROGRESS NOTES
[employer], was evaluated through a synchronous (real-time) audio-video encounter. The patient (or guardian if applicable) is aware that this is a billable service, which includes applicable co-pays. This Virtual Visit was conducted with patient's (and/or legal guardian's) consent. Patient identification was verified, and a caregiver was present when appropriate. The patient was located at Home: [address] [place] [id number]  Provider was located at Home ([address] [place] [address]): [place] (:  1990) is a Established patient, presenting virtually for evaluation of the following:    Christina Carrasco was seen today for medication refill. Diagnoses and all orders for this visit:    Restless legs syndrome    Attention-deficit hyperactivity disorder, combined type  -     amphetamine-dextroamphetamine (ADDERALL) 20 MG tablet; Take 1 tablet by mouth 2 times daily for 30 days. Max Daily Amount: 40 mg    Essential (primary) hypertension    Other orders  -     metoprolol succinate (TOPROL XL) 25 MG extended release tablet; Take 1 tablet by mouth daily  -     pramipexole (MIRAPEX) 0.5 MG tablet; TAKE 1 TABLET BY MOUTH EVERY NIGHT AT 7PM      All meds refilled for a month to give her time to get in the office  Must be seen in person for any future refills    Subjective   HPI  Patient returns to office for follow up ADD. Stable on medication without weight loss, decreased appetite, insomnia, or tremor. Good focus control. Gets three months of scripts at a time. See med order for dose. She did not do an inperson visit as instructed.      She is also out of her meds for htn and RLS  No self monitoring of bp noted  Not sleeping well due to the RLS    Review of Systems   A comprehensive review of system was obtained and negative except findings in the HPI      Objective   Patient-Reported Vitals  No data recorded     Physical Exam  [INSTRUCTIONS:  \"[x]\" Indicates a positive item  \"[]\" Indicates a negative

## 2023-10-23 ENCOUNTER — HOSPITAL ENCOUNTER (EMERGENCY)
Facility: HOSPITAL | Age: 33
Discharge: HOME | End: 2023-10-24
Attending: EMERGENCY MEDICINE
Payer: COMMERCIAL

## 2023-10-23 VITALS
DIASTOLIC BLOOD PRESSURE: 91 MMHG | HEART RATE: 93 BPM | BODY MASS INDEX: 31.39 KG/M2 | SYSTOLIC BLOOD PRESSURE: 135 MMHG | WEIGHT: 200 LBS | TEMPERATURE: 98.1 F | OXYGEN SATURATION: 100 % | HEIGHT: 67 IN | RESPIRATION RATE: 15 BRPM

## 2023-10-23 DIAGNOSIS — B97.89 VIRAL SORE THROAT: Primary | ICD-10-CM

## 2023-10-23 DIAGNOSIS — M54.12 CERVICAL RADICULOPATHY: ICD-10-CM

## 2023-10-23 DIAGNOSIS — J02.8 VIRAL SORE THROAT: Primary | ICD-10-CM

## 2023-10-23 LAB
ANION GAP SERPL CALC-SCNC: 12 MMOL/L (ref 5–15)
BASOPHILS # BLD: 0.1 K/UL (ref 0–1)
BASOPHILS NFR BLD: 1 % (ref 0–1)
BUN SERPL-MCNC: 10 MG/DL (ref 6–20)
BUN/CREAT SERPL: 16 (ref 12–20)
CALCIUM SERPL-MCNC: 9 MG/DL (ref 8.6–10)
CHLORIDE SERPL-SCNC: 103 MMOL/L (ref 98–107)
CO2 SERPL-SCNC: 25 MMOL/L (ref 22–29)
CREAT SERPL-MCNC: 0.63 MG/DL (ref 0.5–0.9)
DEPRECATED S PYO AG THROAT QL EIA: NEGATIVE
DIFFERENTIAL METHOD BLD: ABNORMAL
EOSINOPHIL # BLD: 0.3 K/UL (ref 0–0.4)
EOSINOPHIL NFR BLD: 3 %
ERYTHROCYTE [DISTWIDTH] IN BLOOD BY AUTOMATED COUNT: 11.4 % (ref 11.5–14.5)
GLUCOSE SERPL-MCNC: 124 MG/DL (ref 65–100)
HCT VFR BLD AUTO: 39.9 % (ref 35–47)
HGB BLD-MCNC: 14.1 G/DL (ref 11.5–16)
IMM GRANULOCYTES # BLD AUTO: 0 K/UL (ref 0–0.04)
IMM GRANULOCYTES NFR BLD AUTO: 0 % (ref 0–0.5)
LYMPHOCYTES # BLD: 2.4 K/UL (ref 0.8–3.5)
LYMPHOCYTES NFR BLD: 23 % (ref 12–49)
MCH RBC QN AUTO: 33.5 PG (ref 26–34)
MCHC RBC AUTO-ENTMCNC: 35.3 G/DL (ref 30–36.5)
MCV RBC AUTO: 94.8 FL (ref 80–99)
MONOCYTES # BLD: 0.8 K/UL (ref 0–1)
MONOCYTES NFR BLD: 8 % (ref 5–13)
NEUTS SEG # BLD: 7 K/UL (ref 1.8–8)
NEUTS SEG NFR BLD: 65 % (ref 32–75)
NRBC # BLD: 0 K/UL (ref 0–0.01)
NRBC BLD-RTO: 0 PER 100 WBC
PLATELET # BLD AUTO: 241 K/UL (ref 150–400)
PMV BLD AUTO: 9.5 FL (ref 8.9–12.9)
POTASSIUM SERPL-SCNC: 3.9 MMOL/L (ref 3.5–5.1)
RBC # BLD AUTO: 4.21 M/UL (ref 3.8–5.2)
SODIUM SERPL-SCNC: 140 MMOL/L (ref 136–145)
TROPONIN I BLD-MCNC: <0.04 NG/ML (ref 0–0.08)
WBC # BLD AUTO: 10.6 K/UL (ref 3.6–11)

## 2023-10-23 PROCEDURE — 36415 COLL VENOUS BLD VENIPUNCTURE: CPT

## 2023-10-23 PROCEDURE — 99284 EMERGENCY DEPT VISIT MOD MDM: CPT

## 2023-10-23 PROCEDURE — 93005 ELECTROCARDIOGRAM TRACING: CPT | Performed by: EMERGENCY MEDICINE

## 2023-10-23 PROCEDURE — 6370000000 HC RX 637 (ALT 250 FOR IP): Performed by: EMERGENCY MEDICINE

## 2023-10-23 PROCEDURE — 87070 CULTURE OTHR SPECIMN AEROBIC: CPT

## 2023-10-23 PROCEDURE — 80048 BASIC METABOLIC PNL TOTAL CA: CPT

## 2023-10-23 PROCEDURE — 85025 COMPLETE CBC W/AUTO DIFF WBC: CPT

## 2023-10-23 PROCEDURE — 84484 ASSAY OF TROPONIN QUANT: CPT

## 2023-10-23 PROCEDURE — 87880 STREP A ASSAY W/OPTIC: CPT

## 2023-10-23 RX ORDER — PREDNISONE 20 MG/1
40 TABLET ORAL ONCE
Status: COMPLETED | OUTPATIENT
Start: 2023-10-23 | End: 2023-10-23

## 2023-10-23 RX ORDER — PSEUDOEPHEDRINE HCL 120 MG/1
120 TABLET, FILM COATED, EXTENDED RELEASE ORAL EVERY 12 HOURS
Qty: 14 TABLET | Refills: 0 | Status: SHIPPED | OUTPATIENT
Start: 2023-10-23 | End: 2023-10-30

## 2023-10-23 RX ORDER — PREDNISONE 20 MG/1
40 TABLET ORAL DAILY
Qty: 10 TABLET | Refills: 0 | Status: SHIPPED | OUTPATIENT
Start: 2023-10-23 | End: 2023-10-28

## 2023-10-23 RX ORDER — GUAIFENESIN 600 MG/1
600 TABLET, EXTENDED RELEASE ORAL 2 TIMES DAILY
Qty: 30 TABLET | Refills: 0 | Status: SHIPPED | OUTPATIENT
Start: 2023-10-23 | End: 2023-11-07

## 2023-10-23 RX ORDER — FLUTICASONE PROPIONATE 50 MCG
2 SPRAY, SUSPENSION (ML) NASAL DAILY
Qty: 32 G | Refills: 1 | Status: SHIPPED | OUTPATIENT
Start: 2023-10-23

## 2023-10-23 RX ORDER — CETIRIZINE HYDROCHLORIDE 10 MG/1
10 TABLET ORAL DAILY
Qty: 30 TABLET | Refills: 0 | Status: SHIPPED | OUTPATIENT
Start: 2023-10-23 | End: 2023-11-22

## 2023-10-23 RX ADMIN — PREDNISONE 40 MG: 20 TABLET ORAL at 23:50

## 2023-10-25 LAB
EKG ATRIAL RATE: 72 BPM
EKG DIAGNOSIS: NORMAL
EKG P AXIS: 35 DEGREES
EKG P-R INTERVAL: 152 MS
EKG Q-T INTERVAL: 388 MS
EKG QRS DURATION: 90 MS
EKG QTC CALCULATION (BAZETT): 424 MS
EKG R AXIS: 45 DEGREES
EKG T AXIS: 35 DEGREES
EKG VENTRICULAR RATE: 72 BPM

## 2023-10-25 PROCEDURE — 93010 ELECTROCARDIOGRAM REPORT: CPT | Performed by: STUDENT IN AN ORGANIZED HEALTH CARE EDUCATION/TRAINING PROGRAM

## 2023-10-26 LAB
BACTERIA SPEC CULT: NORMAL
SERVICE CMNT-IMP: NORMAL

## 2023-11-02 NOTE — ED NOTES
Patient discharged by provider.  Patient spoke with nursing supervisor and escorted out by Merced Squires
Patient to CT at this time
Alert and oriented to person, place and time

## 2023-11-30 ENCOUNTER — TELEPHONE (OUTPATIENT)
Age: 33
End: 2023-11-30

## 2023-11-30 NOTE — TELEPHONE ENCOUNTER
----- Message from Reyna Bennett sent at 11/29/2023  4:31 PM EST -----  Subject: Appointment Request    Reason for Call: Established Patient Appointment needed: Routine Existing   Condition Follow Up    QUESTIONS    Reason for appointment request? No appointments available during search     Additional Information for Provider? Yulisa William is needing an in person for   med refills due to controlled substance- nothing in person till Jan. She   would like to come in Monday 12/04. Please give her a call back. She is   open to see anyone in the office.    ---------------------------------------------------------------------------  --------------  Jamil BROUSSARD  [phone]; OK to leave message on voicemail  ---------------------------------------------------------------------------  --------------  SCRIPT ANSWERS

## 2023-12-01 ENCOUNTER — TELEPHONE (OUTPATIENT)
Age: 33
End: 2023-12-01

## 2023-12-01 DIAGNOSIS — F90.2 ATTENTION-DEFICIT HYPERACTIVITY DISORDER, COMBINED TYPE: ICD-10-CM

## 2023-12-01 NOTE — TELEPHONE ENCOUNTER
Louie Zepeda needs a refill of amphetamine-dextroamphetamine (ADDERALL) 20 MG tablet. They have 0 pills/supply left and are requesting a 30 day supply with refills. Pharmacy has been updated in the chart. Patient was advised or scheduled an appointment for the future and to request refills thru the [facility] or by requesting a refill from their pharmacy in the future. Patient was also advised to check with their pharmacy for status of when refills are available. Pt called in and made an apt with you for next available on 12.28.23. Wondering if you would please refill to last until appointment. Please give pt a call to let her know, thanks.

## 2023-12-03 RX ORDER — DEXTROAMPHETAMINE SACCHARATE, AMPHETAMINE ASPARTATE, DEXTROAMPHETAMINE SULFATE AND AMPHETAMINE SULFATE 5; 5; 5; 5 MG/1; MG/1; MG/1; MG/1
20 TABLET ORAL 2 TIMES DAILY
Qty: 60 TABLET | Refills: 0 | Status: SHIPPED | OUTPATIENT
Start: 2023-12-03 | End: 2024-01-02

## 2023-12-07 ENCOUNTER — TELEPHONE (OUTPATIENT)
Age: 33
End: 2023-12-07

## 2023-12-07 NOTE — TELEPHONE ENCOUNTER
Pt called in and states her PA  for amphetamine-dextroamphetamine (ADDERALL) 20 MG tablet. She is asking if you could please give her a call or send her a message on [email or web address] when PA is done? Thanks.

## 2023-12-12 NOTE — TELEPHONE ENCOUNTER
Pt called in and stated she thought about it and realized she wouldn't be able to get out of work tomorrow to come. She is asking if there is another place she could go to for uds or what other options are there? Thanks.

## 2023-12-28 ENCOUNTER — OFFICE VISIT (OUTPATIENT)
Age: 33
End: 2023-12-28
Payer: COMMERCIAL

## 2023-12-28 VITALS
OXYGEN SATURATION: 93 % | HEIGHT: 67 IN | HEART RATE: 81 BPM | TEMPERATURE: 98.2 F | RESPIRATION RATE: 18 BRPM | BODY MASS INDEX: 34.69 KG/M2 | SYSTOLIC BLOOD PRESSURE: 110 MMHG | WEIGHT: 221 LBS | DIASTOLIC BLOOD PRESSURE: 78 MMHG

## 2023-12-28 DIAGNOSIS — I10 ESSENTIAL (PRIMARY) HYPERTENSION: ICD-10-CM

## 2023-12-28 DIAGNOSIS — F90.2 ATTENTION-DEFICIT HYPERACTIVITY DISORDER, COMBINED TYPE: Primary | ICD-10-CM

## 2023-12-28 DIAGNOSIS — Z51.81 MEDICATION MONITORING ENCOUNTER: ICD-10-CM

## 2023-12-28 DIAGNOSIS — Z00.00 WELL EXAM WITHOUT ABNORMAL FINDINGS OF PATIENT 18 YEARS OF AGE OR OLDER: ICD-10-CM

## 2023-12-28 LAB
11-NOR-9-THC-9-COOH, POC: NEGATIVE
ALBUMIN SERPL-MCNC: 3.8 G/DL (ref 3.5–5)
ALBUMIN/GLOB SERPL: 1.2 (ref 1.1–2.2)
ALP SERPL-CCNC: 53 U/L (ref 45–117)
ALT SERPL-CCNC: 25 U/L (ref 12–78)
AMPHETAMINE, URINE, POC: NORMAL
ANION GAP SERPL CALC-SCNC: 4 MMOL/L (ref 5–15)
AST SERPL-CCNC: 11 U/L (ref 15–37)
BASOPHILS # BLD: 0 K/UL (ref 0–0.1)
BASOPHILS NFR BLD: 1 % (ref 0–1)
BENZODIAZEPINES, URINE, POC: NEGATIVE
BENZOYLECGONINE, URINE, POC: NEGATIVE
BILIRUB SERPL-MCNC: 0.5 MG/DL (ref 0.2–1)
BUN SERPL-MCNC: 10 MG/DL (ref 6–20)
BUN/CREAT SERPL: 14 (ref 12–20)
CALCIUM SERPL-MCNC: 8.5 MG/DL (ref 8.5–10.1)
CHLORIDE SERPL-SCNC: 109 MMOL/L (ref 97–108)
CHOLEST SERPL-MCNC: 134 MG/DL
CO2 SERPL-SCNC: 29 MMOL/L (ref 21–32)
CREAT SERPL-MCNC: 0.73 MG/DL (ref 0.55–1.02)
DIFFERENTIAL METHOD BLD: ABNORMAL
EOSINOPHIL # BLD: 0.2 K/UL (ref 0–0.4)
EOSINOPHIL NFR BLD: 3 % (ref 0–7)
ERYTHROCYTE [DISTWIDTH] IN BLOOD BY AUTOMATED COUNT: 11.9 % (ref 11.5–14.5)
GLOBULIN SER CALC-MCNC: 3.3 G/DL (ref 2–4)
GLUCOSE SERPL-MCNC: 104 MG/DL (ref 65–100)
HCT VFR BLD AUTO: 41.7 % (ref 35–47)
HDLC SERPL-MCNC: 44 MG/DL
HDLC SERPL: 3 (ref 0–5)
HGB BLD-MCNC: 14.2 G/DL (ref 11.5–16)
IMM GRANULOCYTES # BLD AUTO: 0 K/UL (ref 0–0.04)
IMM GRANULOCYTES NFR BLD AUTO: 0 % (ref 0–0.5)
LDLC SERPL CALC-MCNC: 79.8 MG/DL (ref 0–100)
LYMPHOCYTES # BLD: 2.5 K/UL (ref 0.8–3.5)
LYMPHOCYTES NFR BLD: 40 % (ref 12–49)
MCH RBC QN AUTO: 32.3 PG (ref 26–34)
MCHC RBC AUTO-ENTMCNC: 34.1 G/DL (ref 30–36.5)
MCV RBC AUTO: 95 FL (ref 80–99)
METHADONE, URINE, POC: NEGATIVE
METHAMPHETAMINE, URINE, POC: NEGATIVE
MONOCYTES # BLD: 0.9 K/UL (ref 0–1)
MONOCYTES NFR BLD: 15 % (ref 5–13)
MORPHINE, URINE, POC: NEGATIVE
NEUTS SEG # BLD: 2.6 K/UL (ref 1.8–8)
NEUTS SEG NFR BLD: 41 % (ref 32–75)
NRBC # BLD: 0 K/UL (ref 0–0.01)
NRBC BLD-RTO: 0 PER 100 WBC
PHENCYCLIDINE, URINE, POC: NEGATIVE
PLATELET # BLD AUTO: 207 K/UL (ref 150–400)
PMV BLD AUTO: 10.1 FL (ref 8.9–12.9)
POTASSIUM SERPL-SCNC: 3.9 MMOL/L (ref 3.5–5.1)
PROT SERPL-MCNC: 7.1 G/DL (ref 6.4–8.2)
RBC # BLD AUTO: 4.39 M/UL (ref 3.8–5.2)
SODIUM SERPL-SCNC: 142 MMOL/L (ref 136–145)
TRIGL SERPL-MCNC: 51 MG/DL
TSH SERPL DL<=0.05 MIU/L-ACNC: 3.14 UIU/ML (ref 0.36–3.74)
URINALYSIS COLOR, POC: NORMAL
VLDLC SERPL CALC-MCNC: 10.2 MG/DL
WBC # BLD AUTO: 6.1 K/UL (ref 3.6–11)

## 2023-12-28 PROCEDURE — 3078F DIAST BP <80 MM HG: CPT | Performed by: NURSE PRACTITIONER

## 2023-12-28 PROCEDURE — 3074F SYST BP LT 130 MM HG: CPT | Performed by: NURSE PRACTITIONER

## 2023-12-28 PROCEDURE — 99395 PREV VISIT EST AGE 18-39: CPT | Performed by: NURSE PRACTITIONER

## 2023-12-28 PROCEDURE — PBSHW AMB POC DRUG SCREEN, URINE: Performed by: NURSE PRACTITIONER

## 2023-12-28 PROCEDURE — 80305 DRUG TEST PRSMV DIR OPT OBS: CPT | Performed by: NURSE PRACTITIONER

## 2023-12-28 RX ORDER — DEXTROAMPHETAMINE SACCHARATE, AMPHETAMINE ASPARTATE, DEXTROAMPHETAMINE SULFATE AND AMPHETAMINE SULFATE 5; 5; 5; 5 MG/1; MG/1; MG/1; MG/1
20 TABLET ORAL 2 TIMES DAILY
Qty: 60 TABLET | Refills: 0 | Status: SHIPPED | OUTPATIENT
Start: 2023-12-28 | End: 2024-01-27

## 2023-12-28 NOTE — PROGRESS NOTES
Chief Complaint   Patient presents with    ADHD     Med refill    Lab Collection     [facility] 10/23/23 Sore throat: Some labs done     1. Have you been to the ER, urgent care clinic since your last visit? Hospitalized since your last visit? [facility] 10/23/23 Sore throat: Some labs done    2. Have you seen or consulted any other health care providers outside of the [address] [place] [address] Avenue since your last visit? Include any pap smears or colon screening.  No

## 2023-12-28 NOTE — PROGRESS NOTES
2023    Saray Cortes (:  1990) is a 34 y.o. female, here for a preventive medicine evaluation. Patient Active Problem List   Diagnosis    Anxiety associated with depression    Asthma    Attention deficit hyperactivity disorder (ADHD), combined type    Somatization disorder    Anxiety    Chest pain    Pseudotumor cerebri    Obesity, morbid ([address])    Restless leg syndrome    Hypertension, essential     She is also here today for fasting labs  Managed on meds for htn, no cp/sob/edema    Patient returns to office for follow up ADD. Stable on medication without weight loss, decreased appetite, insomnia, or tremor. Good focus control. Gets three months of scripts at a time. See med order for dose. Review of Systems  A comprehensive review of system was obtained and negative except findings in the HPI    Prior to Visit Medications    Medication Sig Taking? Authorizing Provider   amphetamine-dextroamphetamine (ADDERALL, 20MG,) 20 MG tablet Take 1 tablet by mouth 2 times daily for 30 days. Max Daily Amount: 40 mg Yes Jodi So APRN - NP   amphetamine-dextroamphetamine (ADDERALL, 20MG,) 20 MG tablet Take 1 tablet by mouth 2 times daily for 30 days. Max Daily Amount: 40 mg Yes Jodi So APRN - NP   amphetamine-dextroamphetamine (ADDERALL, 20MG,) 20 MG tablet Take 1 tablet by mouth 2 times daily for 30 days. Max Daily Amount: 40 mg Yes TRENT Hopson - NP   amphetamine-dextroamphetamine (ADDERALL) 20 MG tablet Take 1 tablet by mouth 2 times daily for 30 days.  Max Daily Amount: 40 mg Yes TRENT Shaw NP   fluticasone (FLONASE) 50 MCG/ACT nasal spray 2 sprays by Each Nostril route daily Yes Jose G Cedeño MD   metoprolol succinate (TOPROL XL) 25 MG extended release tablet Take 1 tablet by mouth daily Yes TRENT Shaw NP   pramipexole (MIRAPEX) 0.5 MG tablet TAKE 1 TABLET BY MOUTH EVERY NIGHT AT 7PM Yes TRENT Shaw NP   nystatin